# Patient Record
Sex: FEMALE | Race: WHITE | NOT HISPANIC OR LATINO | Employment: OTHER | URBAN - METROPOLITAN AREA
[De-identification: names, ages, dates, MRNs, and addresses within clinical notes are randomized per-mention and may not be internally consistent; named-entity substitution may affect disease eponyms.]

---

## 2017-02-06 ENCOUNTER — TELEPHONE (OUTPATIENT)
Dept: PULMONOLOGY | Facility: CLINIC | Age: 67
End: 2017-02-06

## 2017-02-06 NOTE — TELEPHONE ENCOUNTER
Called to do one year f/u from Pulmonary Rehabilitation graduation. Offered the patient to restart the maintenance program. Patient stated that the drive was too far and that she was no long going to be a patient at our office. Patient is now seeing Dr. Olmos in [facility].

## 2017-02-09 ENCOUNTER — TRANSCRIBE ORDERS (OUTPATIENT)
Dept: ADMINISTRATIVE | Facility: HOSPITAL | Age: 67
End: 2017-02-09

## 2017-02-09 DIAGNOSIS — Z12.31 VISIT FOR SCREENING MAMMOGRAM: Primary | ICD-10-CM

## 2017-02-10 ENCOUNTER — OFFICE VISIT (OUTPATIENT)
Dept: CARDIOLOGY | Facility: CLINIC | Age: 67
End: 2017-02-10

## 2017-02-10 VITALS
BODY MASS INDEX: 24.94 KG/M2 | SYSTOLIC BLOOD PRESSURE: 128 MMHG | DIASTOLIC BLOOD PRESSURE: 78 MMHG | WEIGHT: 127 LBS | HEART RATE: 80 BPM | HEIGHT: 60 IN

## 2017-02-10 DIAGNOSIS — I10 ESSENTIAL HYPERTENSION: ICD-10-CM

## 2017-02-10 DIAGNOSIS — E78.2 MIXED HYPERLIPIDEMIA: ICD-10-CM

## 2017-02-10 DIAGNOSIS — I25.10 CORONARY ARTERY DISEASE INVOLVING NATIVE CORONARY ARTERY OF NATIVE HEART WITHOUT ANGINA PECTORIS: Primary | ICD-10-CM

## 2017-02-10 PROCEDURE — 99213 OFFICE O/P EST LOW 20 MIN: CPT | Performed by: INTERNAL MEDICINE

## 2017-02-10 NOTE — PROGRESS NOTES
LOCATION:  [place] Office    REFERRING PHYSICIAN:  Berenice Milan MD    IDENTIFICATION:  A 65-year-old female, retiree from [place], [place].     PROBLEM LIST:  1. Coronary artery disease:  a. November 2006: [facility] with PTCA for septal  2.5 x 8 Cypher to ostium of LAD/RAMUS.  Normal LVEF.  b.  April 2011: MPS per C:  Normal perfusion, ejection fraction preserved.  2.  Nicotine addiction:  a. February 2014 cessation.  3. Dyslipidemia:  a. February 2008:  Total cholesterol 200, HDL 67, triglycerides 219, LDL calculated at 89.     b. Total cholesterol 200, triglycerides 262, HDL 51, LDL 97.  4. Tonsillectomy.  5. Hysterectomy.  6. Rotator cuff repair in 2000.     ALLERGIES/DRUG INTOLERANCES:     1. PENICILLIN, unknown reaction.  2.  CODEINE, unknown reaction.  3. CECLOR, unknown reaction.  4. STATINS, unknown reaction.    CURRENT MEDICATIONS:    1. Aspirin 325 mg.  2. Metoprolol 25 mg one-half b.i.d.  3. Fish oil 2 tablets daily.  4. Calcium plus D 500 mg b.i.d.  5. Vitamin D 2000 international units daily.  6.  Folic acid 1 mg daily.  7. Nitrostat p.r.n.   8. Atrovent MDI p.r.n.   9. CoQ10 at 100 mg b.i.d.    SUBJECTIVE: Patient presents in followup. No chest pain, palpitations, or presyncope.  Baseline shortness breath, unchanged.     OBJECTIVE:   VITAL SIGNS: Blood pressure 120/80, heart rate 68. Weight 136 pounds, down 3 pounds from last visit.  GENERAL: Thin, white female appears stated age.   NECK: No JVD.   CARDIOVASCULAR:  S1 and S2.   CHEST: Clear of prolonged expiratory phase.   ABDOMEN: Positive bowel sounds.   EXTREMITIES: No pitting edema.     IMPRESSION AND PLAN:  1. Coronary artery disease, remote percutaneous coronary intervention (PCI) and stenting. Continue medical regimen.  2. Hypertension, controlled.   3. Dyslipidemia,  on statin therapy. We will continue current regimen.   4. I will see her back in 9-12 months       Emir Lindquist MD*  ADONIS/raiza     cc: Berenice Milan,  MD [facility]

## 2017-03-08 ENCOUNTER — HOSPITAL ENCOUNTER (OUTPATIENT)
Dept: MAMMOGRAPHY | Facility: HOSPITAL | Age: 67
Discharge: HOME | End: 2017-03-08
Admitting: FAMILY MEDICINE

## 2017-03-08 DIAGNOSIS — Z12.31 VISIT FOR SCREENING MAMMOGRAM: ICD-10-CM

## 2017-03-08 PROCEDURE — G0202 SCR MAMMO BI INCL CAD: HCPCS | Performed by: RADIOLOGY

## 2017-03-08 PROCEDURE — G0202 SCR MAMMO BI INCL CAD: HCPCS

## 2017-03-08 PROCEDURE — 77063 BREAST TOMOSYNTHESIS BI: CPT

## 2017-03-08 PROCEDURE — 77063 BREAST TOMOSYNTHESIS BI: CPT | Performed by: RADIOLOGY

## 2017-11-10 ENCOUNTER — OFFICE VISIT (OUTPATIENT)
Dept: CARDIOLOGY | Facility: CLINIC | Age: 67
End: 2017-11-10

## 2017-11-10 VITALS
DIASTOLIC BLOOD PRESSURE: 80 MMHG | HEIGHT: 59 IN | SYSTOLIC BLOOD PRESSURE: 132 MMHG | HEART RATE: 76 BPM | OXYGEN SATURATION: 99 % | BODY MASS INDEX: 25.12 KG/M2 | WEIGHT: 124.6 LBS

## 2017-11-10 DIAGNOSIS — I25.10 CORONARY ARTERY DISEASE INVOLVING NATIVE CORONARY ARTERY OF NATIVE HEART WITHOUT ANGINA PECTORIS: Primary | ICD-10-CM

## 2017-11-10 DIAGNOSIS — I10 ESSENTIAL HYPERTENSION: ICD-10-CM

## 2017-11-10 DIAGNOSIS — E78.2 MIXED HYPERLIPIDEMIA: ICD-10-CM

## 2017-11-10 PROCEDURE — 99214 OFFICE O/P EST MOD 30 MIN: CPT | Performed by: INTERNAL MEDICINE

## 2017-11-10 NOTE — PROGRESS NOTES
[facility] [facility]  Office Progress Note  Precious Parmar  1950  [phone]      Visit Date: 11/10/2017    PCP: Wero Daley MD  4 [facility] [address]    IDENTIFICATION: A 67 y.o. female , retiree from [place], [place].    Chief Complaint   Patient presents with   • Coronary Artery Disease       PROBLEM LIST:   1. Coronary artery disease:  a. November 2006: [facility] with PTCA for septal  2.5 x 8 Cypher to ostium of LAD/RAMUS.  Normal LVEF.  b.  April 2011: MPS per [facility]:  Normal perfusion, ejection fraction preserved.  c. 9/14 echo wnl IDD  2.  Nicotine addiction:  a. February 2014 cessation.  3. Dyslipidemia:  a. February 2008:  Total cholesterol 200, HDL 67, triglycerides 219, LDL calculated at 89.     b. Total cholesterol 200, triglycerides 262, HDL 51, LDL 97.  4. Tonsillectomy.  5. Hysterectomy.  6. Rotator cuff repair in 2000.  Allergies  Allergies   Allergen Reactions   • Ceclor [Cefaclor]    • Codeine    • Penicillins    • Statins        Current Medications    Current Outpatient Prescriptions:   •  aspirin (ECOTRIN) 325 MG EC tablet, Take 325 mg by mouth Daily., Disp: , Rfl:   •  Cholecalciferol (HM VITAMIN D3) 4000 UNITS capsule, Take 4,000 Units by mouth Daily., Disp: , Rfl:   •  Coenzyme Q10 (CO Q-10) 200 MG capsule, Take 200 mg by mouth Daily., Disp: , Rfl:   •  FOLIC ACID PO, Take 0.8 mg by mouth Daily. OTC, Disp: , Rfl:   •  ipratropium-albuterol (DUO-NEB) 0.5-2.5 mg/mL nebulizer, Take 3 mL by nebulization every 4 (four) hours as needed for wheezing., Disp: , Rfl:   •  metoprolol tartrate (LOPRESSOR) 25 MG tablet, Take 12.5 mg by mouth 2 (Two) Times a Day., Disp: , Rfl:   •  nitroglycerin (NITROSTAT) 0.4 MG SL tablet, Place 0.4 mg under the tongue as needed for chest pain. Take no more than 3 doses in 15 minutes., Disp: , Rfl:   •  O2 (OXYGEN), Oxygen; Patient Sig: Oxygen 2 LITERS PRN & QHS; 0; 15-Dieudonne-2015; Active, Disp: , Rfl:   •  Omega-3 Fatty Acids (FISH  "OIL) 1000 MG capsule capsule, Take 2,000 mg by mouth Daily., Disp: , Rfl:   •  PROAIR  (90 BASE) MCG/ACT inhaler, Inhale 1 puff As Needed., Disp: , Rfl:   •  vitamin B-12 (CYANOCOBALAMIN) 1000 MCG tablet, Take 1,000 mcg by mouth Every Other Day., Disp: , Rfl:       History of Present Illness     Pt denies any new chest pain, dyspnea, dyspnea on exertion, orthopnea, PND, palpitations, lower extremity edema, or claudication.  She is had baseline shortness of breath some worsening as she has discontinued her pulmonary rehabilitation.    ROS:  All systems have been reviewed and are negative with the exception of those mentioned in the HPI.    OBJECTIVE:  Vitals:    11/10/17 1116   Weight: 124 lb 9.6 oz (56.5 kg)   Height: 59\" (149.9 cm)     Physical Exam   Constitutional: She appears well-developed and well-nourished.   Neck: Normal range of motion. Neck supple. No hepatojugular reflux and no JVD present. Carotid bruit is not present. No tracheal deviation present. No thyromegaly present.   Cardiovascular: Normal rate, regular rhythm, S1 normal, S2 normal, intact distal pulses and normal pulses.  PMI is not displaced.  Exam reveals no gallop, no distant heart sounds, no friction rub, no midsystolic click and no opening snap.    No murmur heard.  Pulses:       Radial pulses are 2+ on the right side, and 2+ on the left side.        Dorsalis pedis pulses are 2+ on the right side, and 2+ on the left side.        Posterior tibial pulses are 2+ on the right side, and 2+ on the left side.   Pulmonary/Chest: Effort normal and breath sounds normal. She has no wheezes. She has no rales.   Abdominal: Soft. Bowel sounds are normal. She exhibits no mass. There is no tenderness. There is no guarding.       Diagnostic Data:  Procedures      ASSESSMENT:   Diagnosis Plan   1. Coronary artery disease involving native coronary artery of native heart without angina pectoris     2. Essential hypertension     3. Mixed hyperlipidemia   "       PLAN:  1. Coronary artery disease, remote percutaneous coronary intervention (PCI) and stenting. Continue medical regimen.  2. Hypertension, controlled.   3. Dyslipidemia,  on statin therapy. We will continue current regimen.   4. I will see her back in 12 months     Wero Daley MD, thank you for referring Ms. Parmar for evaluation.  I have forwarded my electronically generated recommendations to you for review.  Please do not hesitate to call with any questions.      Emir Lindquist MD, FACC

## 2018-04-10 ENCOUNTER — APPOINTMENT (OUTPATIENT)
Dept: WOMENS IMAGING | Facility: HOSPITAL | Age: 68
End: 2018-04-10

## 2018-04-10 PROCEDURE — 77067 SCR MAMMO BI INCL CAD: CPT | Performed by: RADIOLOGY

## 2018-11-13 ENCOUNTER — OFFICE VISIT (OUTPATIENT)
Dept: CARDIOLOGY | Facility: CLINIC | Age: 68
End: 2018-11-13

## 2018-11-13 VITALS
DIASTOLIC BLOOD PRESSURE: 70 MMHG | WEIGHT: 117 LBS | BODY MASS INDEX: 22.97 KG/M2 | HEART RATE: 75 BPM | SYSTOLIC BLOOD PRESSURE: 108 MMHG | HEIGHT: 60 IN

## 2018-11-13 DIAGNOSIS — I10 ESSENTIAL HYPERTENSION: ICD-10-CM

## 2018-11-13 DIAGNOSIS — E78.2 MIXED HYPERLIPIDEMIA: ICD-10-CM

## 2018-11-13 DIAGNOSIS — I25.10 CORONARY ARTERY DISEASE INVOLVING NATIVE CORONARY ARTERY OF NATIVE HEART WITHOUT ANGINA PECTORIS: Primary | ICD-10-CM

## 2018-11-13 PROCEDURE — 99213 OFFICE O/P EST LOW 20 MIN: CPT | Performed by: INTERNAL MEDICINE

## 2018-11-13 RX ORDER — IBANDRONATE SODIUM 150 MG/1
150 TABLET, FILM COATED ORAL
COMMUNITY
Start: 2018-10-29 | End: 2020-08-07

## 2018-11-13 NOTE — PROGRESS NOTES
[place] [facility] at [facility]  Office Progress Note  Precious Parmar  1950  [phone]      Visit Date: 11/13/2018     PCP: Wero Daley MD  4 [facility] [place] [address]    IDENTIFICATION: A 68 y.o. female , retiree from [place], [place].    Chief Complaint   Patient presents with   • Coronary Artery Disease       PROBLEM LIST:   1. Coronary artery disease:  a. November 2006: [facility] with PTCA for septal  2.5 x 8 Cypher to ostium of LAD/RAMUS.  Normal LVEF.  b.  April 2011: MPS per [facility]:  Normal perfusion, ejection fraction preserved.  c. 9/14 echo wnl IDD  2.  Nicotine addiction:  a. February 2014 cessation.  3. Dyslipidemia:  a. February 2008:  Total cholesterol 200, HDL 67, triglycerides 219, LDL calculated at 89.     b. Total cholesterol 200, triglycerides 262, HDL 51, LDL 97.  4. Severe COPD- smoking cessation 2014.  10/17 CTA chest [place] severe centrilobar emphysema.  5. Hysterectomy.  6. Rotator cuff repair in 2000.  Allergies  Allergies   Allergen Reactions   • Ceclor [Cefaclor]    • Codeine    • Penicillins    • Statins        Current Medications    Current Outpatient Medications:   •  aspirin (ECOTRIN) 325 MG EC tablet, Take 325 mg by mouth Daily., Disp: , Rfl:   •  Cholecalciferol (HM VITAMIN D3) 4000 UNITS capsule, Take 4,000 Units by mouth Daily., Disp: , Rfl:   •  Coenzyme Q10 (CO Q-10) 200 MG capsule, Take 200 mg by mouth Daily., Disp: , Rfl:   •  FOLIC ACID PO, Take 800 mcg by mouth Daily. OTC , Disp: , Rfl:   •  ipratropium-albuterol (DUO-NEB) 0.5-2.5 mg/mL nebulizer, Take 3 mL by nebulization every 4 (four) hours as needed for wheezing., Disp: , Rfl:   •  metoprolol tartrate (LOPRESSOR) 25 MG tablet, Take 12.5 mg by mouth 2 (Two) Times a Day., Disp: , Rfl:   •  nitroglycerin (NITROSTAT) 0.4 MG SL tablet, Place 0.4 mg under the tongue as needed for chest pain. Take no more than 3 doses in 15 minutes., Disp: , Rfl:   •  O2 (OXYGEN), Oxygen; Patient Sig: Oxygen 2  "LITERS PRN & QHS; 0; 15-Dieudonne-2015; Active, Disp: , Rfl:   •  Omega-3 Fatty Acids (FISH OIL) 1000 MG capsule capsule, Take 2,000 mg by mouth Daily., Disp: , Rfl:   •  PROAIR  (90 BASE) MCG/ACT inhaler, Inhale 1 puff As Needed., Disp: , Rfl:   •  vitamin B-12 (CYANOCOBALAMIN) 1000 MCG tablet, Take 1,000 mcg by mouth Every Other Day., Disp: , Rfl:       History of Present Illness     Pt denies any new chest pain, dyspnea, dyspnea on exertion, orthopnea, PND, palpitations, lower extremity edema, or claudication. Fatigued w baseline dyspnea using O2 at night.   No ntg use.    ROS:  All systems have been reviewed and are negative with the exception of those mentioned in the HPI.    OBJECTIVE:  Vitals:    11/13/18 1125   Weight: 53.1 kg (117 lb)   Height: 151.1 cm (59.5\")     Physical Exam   Constitutional: She appears well-developed and well-nourished.   Neck: Normal range of motion. Neck supple. No hepatojugular reflux and no JVD present. Carotid bruit is not present. No tracheal deviation present. No thyromegaly present.   Cardiovascular: Normal rate, regular rhythm, S1 normal, S2 normal, intact distal pulses and normal pulses. PMI is not displaced. Exam reveals no gallop, no distant heart sounds, no friction rub, no midsystolic click and no opening snap.   No murmur heard.  Pulses:       Radial pulses are 2+ on the right side, and 2+ on the left side.        Dorsalis pedis pulses are 2+ on the right side, and 2+ on the left side.        Posterior tibial pulses are 2+ on the right side, and 2+ on the left side.   Pulmonary/Chest: Effort normal and breath sounds normal. She has no wheezes. She has no rales.   Abdominal: Soft. Bowel sounds are normal. She exhibits no mass. There is no tenderness. There is no guarding.       Diagnostic Data:  Procedures      ASSESSMENT:   Diagnosis Plan   1. Coronary artery disease involving native coronary artery of native heart without angina pectoris     2. Essential hypertension  "    3. Mixed hyperlipidemia         PLAN:  1. Coronary artery disease, remote percutaneous coronary intervention (PCI) and stenting. Continue medical regimen.  2. Hypertension, controlled.   3. Dyslipidemia,  on statin therapy. We will continue current regimen.   4. Fatigue /hair loss - rec'd serologies w tsh  5. I will see her back in 12 months     Wero Daley MD, thank you for referring Ms. Parmar for evaluation.  I have forwarded my electronically generated recommendations to you for review.  Please do not hesitate to call with any questions.    Emir Lindquist MD, FACC

## 2020-02-05 NOTE — PROGRESS NOTES
[place] [facility]  Office Progress Note  Precious Parmar  1950      Visit Date: 02/06/20    PCP: Wero Daley MD  4 [facility] [place] [address]    IDENTIFICATION: A 69 y.o. female retiree from [place], [place].  Released from  hospice 2019      PROBLEM LIST:   1. Coronary artery disease:  a. November 2006: [facility] with PTCA for septal  2.5 x 8 Cypher to ostium of LAD/RAMUS.  Normal LVEF.  b.  April 2011: MPS per [facility]:  Normal perfusion, ejection fraction preserved.  c. 9/14 echo wnl IDD  2.  Nicotine addiction:  a. February 2014 cessation.  3. Dyslipidemia:  a. February 2008:  Total cholesterol 200, HDL 67, triglycerides 219, LDL calculated at 89.     b. Total cholesterol 200, triglycerides 262, HDL 51, LDL 97.  4. Severe COPD- smoking cessation 2014.  10/17 CTA chest [place] severe centrilobar emphysema.  5. Hysterectomy.  Rotator cuff repair in 2000.    Chief Complaint   Patient presents with   • Coronary Artery Disease       Allergies  Allergies   Allergen Reactions   • Ceclor [Cefaclor]    • Codeine    • Diltiazem Swelling   • Penicillins    • Statins        Current Medications    Current Outpatient Medications:   •  ALPRAZolam (XANAX) 0.25 MG tablet, Take 0.25 mg by mouth 3 (Three) Times a Day As Needed for Anxiety., Disp: , Rfl:   •  aspirin (ECOTRIN) 325 MG EC tablet, Take 325 mg by mouth Daily., Disp: , Rfl:   •  Cholecalciferol ( VITAMIN D3) 4000 UNITS capsule, Take 4,000 Units by mouth Daily., Disp: , Rfl:   •  Coenzyme Q10 (CO Q-10) 200 MG capsule, Take 200 mg by mouth Daily., Disp: , Rfl:   •  FOLIC ACID PO, Take 800 mcg by mouth Daily. OTC , Disp: , Rfl:   •  ibandronate (BONIVA) 150 MG tablet, Take 150 mg by mouth Every 30 (Thirty) Days., Disp: , Rfl:   •  ipratropium-albuterol (DUO-NEB) 0.5-2.5 mg/mL nebulizer, Take 3 mL by nebulization every 4 (four) hours as needed for wheezing., Disp: , Rfl:   •  metoprolol tartrate (LOPRESSOR) 25 MG tablet, Take 12.5 mg by  "mouth 2 (Two) Times a Day., Disp: , Rfl:   •  nitroglycerin (NITROSTAT) 0.4 MG SL tablet, Place 0.4 mg under the tongue as needed for chest pain. Take no more than 3 doses in 15 minutes., Disp: , Rfl:   •  O2 (OXYGEN), Oxygen; Patient Sig: Oxygen 2 LITERS PRN & QHS; 0; 15-Dieudonne-2015; Active, Disp: , Rfl:   •  Omega-3 Fatty Acids (FISH OIL) 1000 MG capsule capsule, Take 2,000 mg by mouth Daily., Disp: , Rfl:   •  PROAIR  (90 BASE) MCG/ACT inhaler, Inhale 1 puff As Needed., Disp: , Rfl:   •  vitamin B-12 (CYANOCOBALAMIN) 1000 MCG tablet, Take 1,000 mcg by mouth Every Other Day., Disp: , Rfl:       History of Present Illness   Precious Parmar is a 69 y.o. year old female here for follow up.  Had a dismal 2019 been taken to [facility] in [place] with respiratory decline ultimately placed on hospice and sent home.  She was down to 75 pounds.  With care and  Compassion of friends and family she was able to rally and improve over the last several months.          OBJECTIVE:  Vitals:    02/06/20 1340   BP: 130/70   BP Location: Right arm   Patient Position: Sitting   Pulse: 105   SpO2: 98%   Weight: 43.1 kg (95 lb)   Height: 151.1 cm (59.5\")     Physical Exam   Constitutional: She appears well-developed and well-nourished.   Frail on  chronic O2   Neck: Normal range of motion. Neck supple. No hepatojugular reflux and no JVD present. Carotid bruit is not present. No tracheal deviation present. No thyromegaly present.   Cardiovascular: Normal rate, regular rhythm, S1 normal, S2 normal, intact distal pulses and normal pulses. PMI is not displaced. Exam reveals no gallop, no distant heart sounds, no friction rub, no midsystolic click and no opening snap.   No murmur heard.  Pulses:       Radial pulses are 2+ on the right side, and 2+ on the left side.        Dorsalis pedis pulses are 2+ on the right side, and 2+ on the left side.        Posterior tibial pulses are 2+ on the right side, and 2+ on the left side. "   Pulmonary/Chest: Effort normal. She has wheezes. She has no rales.   Abdominal: Soft. Bowel sounds are normal. She exhibits no mass. There is no tenderness. There is no guarding.       Diagnostic Data:  Procedures      ASSESSMENT:   Diagnosis Plan   1. Coronary artery disease involving native coronary artery of native heart without angina pectoris     2. Essential hypertension     3. Mixed hyperlipidemia         PLAN:  CAD post remote PCI and stenting continued current regimen decrease aspirin to 81 daily    Hypertension controlled on current regimen    Dyslipidemia off statin therapy due to intolerance        Wero Daley MD, thank you for referring Ms. Parmar for evaluation.  I have forwarded my electronically generated recommendations to you for review.  Please do not hesitate to call with any questions.      Emir Lindquist MD, FACC

## 2020-02-06 ENCOUNTER — OFFICE VISIT (OUTPATIENT)
Dept: CARDIOLOGY | Facility: CLINIC | Age: 70
End: 2020-02-06

## 2020-02-06 VITALS
HEART RATE: 105 BPM | WEIGHT: 95 LBS | BODY MASS INDEX: 18.65 KG/M2 | OXYGEN SATURATION: 98 % | DIASTOLIC BLOOD PRESSURE: 70 MMHG | SYSTOLIC BLOOD PRESSURE: 130 MMHG | HEIGHT: 60 IN

## 2020-02-06 DIAGNOSIS — I10 ESSENTIAL HYPERTENSION: ICD-10-CM

## 2020-02-06 DIAGNOSIS — E78.2 MIXED HYPERLIPIDEMIA: ICD-10-CM

## 2020-02-06 DIAGNOSIS — I25.10 CORONARY ARTERY DISEASE INVOLVING NATIVE CORONARY ARTERY OF NATIVE HEART WITHOUT ANGINA PECTORIS: Primary | ICD-10-CM

## 2020-02-06 PROCEDURE — 99213 OFFICE O/P EST LOW 20 MIN: CPT | Performed by: INTERNAL MEDICINE

## 2020-02-06 RX ORDER — ALPRAZOLAM 0.25 MG/1
0.25 TABLET ORAL 3 TIMES DAILY PRN
COMMUNITY
End: 2022-06-16 | Stop reason: SDUPTHER

## 2020-08-03 NOTE — PROGRESS NOTES
[place] [facility] at [facility]  Office Progress Note  Precious Parmar  1950  [address] [facility] [address]       Visit Date: 08/07/20    PCP: Wero Daley MD  4 [facility] [address]    IDENTIFICATION: A 70 y.o. female  retiree from [place], [place].  Released from  hospice 2019      PROBLEM LIST:   1. Coronary artery disease:  a. November 2006: [facility] with PTCA for septal  2.5 x 8 Cypher to ostium of LAD/RAMUS.  Normal LVEF.  b.  April 2011: MPS per [facility]:  Normal perfusion, ejection fraction preserved.  c. 9/14 echo wnl IDD  2.  Nicotine addiction:  a. February 2014 cessation.  3. Dyslipidemia:  a. February 2008:  Total cholesterol 200, HDL 67, triglycerides 219, LDL calculated at 89.     b. Total cholesterol 200, triglycerides 262, HDL 51, LDL 97.  4. Severe COPD- smoking cessation 2014.  1. 10/17 CTA chest [place] severe centrilobar emphysema.  5. Hysterectomy.  6. Rotator cuff repair in 2000.      CC:   Chief Complaint   Patient presents with   • Coronary Artery Disease       Allergies  Allergies   Allergen Reactions   • Ceclor [Cefaclor]    • Codeine    • Diltiazem Swelling   • Penicillins    • Statins        Current Medications    Current Outpatient Medications:   •  ALPRAZolam (XANAX) 0.25 MG tablet, Take 0.25 mg by mouth 3 (Three) Times a Day As Needed for Anxiety., Disp: , Rfl:   •  aspirin (ECOTRIN) 325 MG EC tablet, Take 81 mg by mouth Daily., Disp: , Rfl:   •  Cholecalciferol (HM VITAMIN D3) 4000 UNITS capsule, Take 4,000 Units by mouth Daily., Disp: , Rfl:   •  Coenzyme Q10 (CO Q-10) 200 MG capsule, Take 200 mg by mouth Daily., Disp: , Rfl:   •  FOLIC ACID PO, Take 800 mcg by mouth Daily. OTC , Disp: , Rfl:   •  ipratropium-albuterol (DUO-NEB) 0.5-2.5 mg/mL nebulizer, Take 3 mL by nebulization every 4 (four) hours as needed for wheezing., Disp: , Rfl:   •  metoprolol tartrate (LOPRESSOR) 25 MG tablet, Take 25 mg by mouth 2 (Two) Times a Day., Disp: , Rfl:   •   "nitroglycerin (NITROSTAT) 0.4 MG SL tablet, Place 0.4 mg under the tongue as needed for chest pain. Take no more than 3 doses in 15 minutes., Disp: , Rfl:   •  O2 (OXYGEN), Oxygen; Patient Sig: Oxygen 2 LITERS PRN & QHS; 0; 15-Dieudonne-2015; Active, Disp: , Rfl:   •  Omega-3 Fatty Acids (FISH OIL) 1000 MG capsule capsule, Take 2,000 mg by mouth Daily., Disp: , Rfl:   •  PROAIR  (90 BASE) MCG/ACT inhaler, Inhale 1 puff As Needed., Disp: , Rfl:   •  vitamin B-12 (CYANOCOBALAMIN) 1000 MCG tablet, Take 1,000 mcg by mouth Every Other Day. mon-wed-fri, Disp: , Rfl:       History of Present Illness   Precious Parmar is a 70 y.o. year old female  For telehealth follow up.    Pt denies any chest pain, dyspnea, dyspnea on exertion, orthopnea, PND, palpitations, lower extremity edema, or claudication.  Some gingival infx on abx  Staying home    OBJECTIVE:  Vitals:    08/07/20 1440 08/07/20 1445   BP: 98/56 113/69   BP Location: Left arm Right arm   Patient Position: Sitting Sitting   Pulse: 70 81   Weight: 40.7 kg (89 lb 12.8 oz)    Height: 151.1 cm (59.5\")      Physical Exam    Diagnostic Data:  Procedures      ASSESSMENT:   Diagnosis Plan   1. Coronary artery disease involving native coronary artery of native heart without angina pectoris     2. Essential hypertension     3. Mixed hyperlipidemia         PLAN:  CAD post remote PCI and stenting continued current regimen decrease aspirin to 81 daily     Hypertension controlled on current regimen     Dyslipidemia off statin therapy due to intolerance       Wero Daley MD, thank you for referring Ms. Parmar for evaluation.  I have forwarded my electronically generated recommendations to you for review.  Please do not hesitate to call with any questions.  This patient has consented to a telehealth visit via telehealth. The visit was scheduled as a phone visit to comply with patient safety concerns in accordance with CDC recommendations.  All vitals recorded within this visit " are reported by the patient.  I spent  15 minutes in total including but not limited to the 11 minutes spent in direct conversation with this patient.       Emir Lindquist MD, FACC

## 2020-08-07 ENCOUNTER — OFFICE VISIT (OUTPATIENT)
Dept: CARDIOLOGY | Facility: CLINIC | Age: 70
End: 2020-08-07

## 2020-08-07 VITALS
HEIGHT: 60 IN | BODY MASS INDEX: 17.63 KG/M2 | SYSTOLIC BLOOD PRESSURE: 113 MMHG | DIASTOLIC BLOOD PRESSURE: 69 MMHG | WEIGHT: 89.8 LBS | HEART RATE: 81 BPM

## 2020-08-07 DIAGNOSIS — I10 ESSENTIAL HYPERTENSION: ICD-10-CM

## 2020-08-07 DIAGNOSIS — E78.2 MIXED HYPERLIPIDEMIA: ICD-10-CM

## 2020-08-07 DIAGNOSIS — I25.10 CORONARY ARTERY DISEASE INVOLVING NATIVE CORONARY ARTERY OF NATIVE HEART WITHOUT ANGINA PECTORIS: Primary | ICD-10-CM

## 2020-08-07 PROCEDURE — 99442 PR PHYS/QHP TELEPHONE EVALUATION 11-20 MIN: CPT | Performed by: INTERNAL MEDICINE

## 2021-08-13 ENCOUNTER — OFFICE VISIT (OUTPATIENT)
Dept: CARDIOLOGY | Facility: CLINIC | Age: 71
End: 2021-08-13

## 2021-08-13 VITALS
HEART RATE: 83 BPM | OXYGEN SATURATION: 97 % | WEIGHT: 91.8 LBS | HEIGHT: 59 IN | BODY MASS INDEX: 18.51 KG/M2 | SYSTOLIC BLOOD PRESSURE: 122 MMHG | DIASTOLIC BLOOD PRESSURE: 79 MMHG

## 2021-08-13 DIAGNOSIS — E78.2 MIXED HYPERLIPIDEMIA: ICD-10-CM

## 2021-08-13 DIAGNOSIS — I25.10 CORONARY ARTERY DISEASE INVOLVING NATIVE CORONARY ARTERY OF NATIVE HEART WITHOUT ANGINA PECTORIS: Primary | ICD-10-CM

## 2021-08-13 DIAGNOSIS — I10 ESSENTIAL HYPERTENSION: ICD-10-CM

## 2021-08-13 PROCEDURE — 99442 PR PHYS/QHP TELEPHONE EVALUATION 11-20 MIN: CPT | Performed by: INTERNAL MEDICINE

## 2021-08-13 RX ORDER — MIRTAZAPINE 15 MG/1
TABLET, FILM COATED ORAL
COMMUNITY
Start: 2021-06-22 | End: 2022-09-16 | Stop reason: SDUPTHER

## 2021-08-13 NOTE — PROGRESS NOTES
[facility]  Office Progress Note  Precious Parmar  1950  [address] [facility] [address]       Visit Date: 08/13/21    PCP: Wero Daley MD  [address] [place] [address]    IDENTIFICATION: A 71 y.o. female  retiree from [place], [place].  Released from  hospice 2019      PROBLEM LIST:   1. Coronary artery disease:  a. November 2006: [facility] with PTCA for septal  2.5 x 8 Cypher to ostium of LAD/RAMUS.  Normal LVEF.  b.  April 2011: MPS per C:  Normal perfusion, ejection fraction preserved.  c. 9/14 echo wnl IDD  2.  Nicotine addiction:  a. February 2014 cessation.  3. Dyslipidemia:  a. February 2008:  Total cholesterol 200, HDL 67, triglycerides 219, LDL calculated at 89.     b. Total cholesterol 200, triglycerides 262, HDL 51, LDL 97.  4. Severe COPD- smoking cessation 2014.  1. 10/17 CTA chest [place] severe centrilobar emphysema.  5. Hysterectomy.  6. Rotator cuff repair in 2000.      CC:   Chief Complaint   Patient presents with   • Coronary artery disease involving native coronary artery of        Allergies  Allergies   Allergen Reactions   • Ceclor [Cefaclor]    • Codeine    • Diltiazem Swelling   • Penicillins    • Statins        Current Medications    Current Outpatient Medications:   •  ALPRAZolam (XANAX) 0.25 MG tablet, Take 0.25 mg by mouth 3 (Three) Times a Day As Needed for Anxiety., Disp: , Rfl:   •  Cholecalciferol ( VITAMIN D3) 4000 UNITS capsule, Take 4,000 Units by mouth Daily., Disp: , Rfl:   •  FOLIC ACID PO, Take 800 mcg by mouth Daily. OTC , Disp: , Rfl:   •  ipratropium-albuterol (DUO-NEB) 0.5-2.5 mg/mL nebulizer, Take 3 mL by nebulization Every 4 (Four) Hours., Disp: , Rfl:   •  metoprolol tartrate (LOPRESSOR) 25 MG tablet, Take 25 mg by mouth 2 (Two) Times a Day., Disp: , Rfl:   •  mirtazapine (REMERON) 15 MG tablet, every night at bedtime., Disp: , Rfl:   •  nitroglycerin (NITROSTAT) 0.4 MG SL tablet, Place 0.4 mg under the tongue  "as needed for chest pain. Take no more than 3 doses in 15 minutes., Disp: , Rfl:   •  O2 (OXYGEN), 2 L/min., Disp: , Rfl:   •  Omega-3 Fatty Acids (FISH OIL) 1000 MG capsule capsule, Take 2,000 mg by mouth Daily., Disp: , Rfl:   •  PROAIR  (90 BASE) MCG/ACT inhaler, Inhale 1 puff As Needed., Disp: , Rfl:   •  vitamin B-12 (CYANOCOBALAMIN) 1000 MCG tablet, Take 1,000 mcg by mouth Every Other Day. mon-wed-fri, Disp: , Rfl:       History of Present Illness   Precious Parmar is a 71 y.o. year old female  For telehealth follow up.    Pt denies any chest pain, dyspnea, dyspnea on exertion, orthopnea, PND, palpitations, lower extremity edema, or claudication.  Has lost weight and has lost interest in taking her Ensure shakes  Staying home    OBJECTIVE:  Vitals:    08/13/21 1545   BP: 122/79   Patient Position: Sitting   Pulse: 83   SpO2: 97%   Weight: 41.6 kg (91 lb 12.8 oz)   Height: 149.9 cm (59\")     Physical Exam    Diagnostic Data:  Procedures      ASSESSMENT:   Diagnosis Plan   1. Coronary artery disease involving native coronary artery of native heart without angina pectoris     2. Essential hypertension     3. Mixed hyperlipidemia         PLAN:  CAD post remote PCI and stenting continued current regimen decrease aspirin to 81 daily     Hypertension controlled on current regimen     Dyslipidemia off statin therapy due to intolerance       Wero Daley MD, thank you for referring Ms. Parmar for evaluation.  I have forwarded my electronically generated recommendations to you for review.  Please do not hesitate to call with any questions.  This patient has consented to a telehealth visit via teleiCrederity. The visit was scheduled as a phone visit to comply with patient safety concerns in accordance with CDC recommendations.  All vitals recorded within this visit are reported by the patient.  I spent  15 minutes in total including but not limited to the 11 minutes spent in direct conversation with this patient. "       Emir Lindquist MD, [facility]

## 2022-06-02 ENCOUNTER — TELEPHONE (OUTPATIENT)
Dept: FAMILY MEDICINE CLINIC | Facility: CLINIC | Age: 72
End: 2022-06-02

## 2022-06-02 NOTE — TELEPHONE ENCOUNTER
PT. IS DUE FOR A , BUT IS UNSURE OF HOW TO SCHEDULE APPOINTMENT. SHE IS WANTING TELE HEALTH, BUT NOT SURE IF SHE COULD DO TELE HEALTH BEING HER MEDS ARE CONTROLLED.     PT. REQUESTED TO BE CONTACTED [phone]

## 2022-06-03 NOTE — TELEPHONE ENCOUNTER
I called pt yesterday and had front make pt appt for blood work, and can do a my chart appt with Dr. Daley because pt is pretty much home bound

## 2022-06-07 DIAGNOSIS — Z11.59 NEED FOR HEPATITIS C SCREENING TEST: ICD-10-CM

## 2022-06-07 DIAGNOSIS — I10 PRIMARY HYPERTENSION: ICD-10-CM

## 2022-06-07 DIAGNOSIS — M85.89 OSTEOPENIA OF MULTIPLE SITES: ICD-10-CM

## 2022-06-07 DIAGNOSIS — E78.5 DYSLIPIDEMIA: Primary | ICD-10-CM

## 2022-06-07 DIAGNOSIS — E55.9 VITAMIN D DEFICIENCY: ICD-10-CM

## 2022-06-08 ENCOUNTER — LAB (OUTPATIENT)
Dept: FAMILY MEDICINE CLINIC | Facility: CLINIC | Age: 72
End: 2022-06-08

## 2022-06-08 ENCOUNTER — TELEPHONE (OUTPATIENT)
Dept: FAMILY MEDICINE CLINIC | Facility: CLINIC | Age: 72
End: 2022-06-08

## 2022-06-08 DIAGNOSIS — E55.9 VITAMIN D DEFICIENCY: ICD-10-CM

## 2022-06-08 DIAGNOSIS — Z11.59 NEED FOR HEPATITIS C SCREENING TEST: ICD-10-CM

## 2022-06-08 DIAGNOSIS — I10 PRIMARY HYPERTENSION: ICD-10-CM

## 2022-06-08 DIAGNOSIS — M85.89 OSTEOPENIA OF MULTIPLE SITES: ICD-10-CM

## 2022-06-08 DIAGNOSIS — E78.5 DYSLIPIDEMIA: ICD-10-CM

## 2022-06-08 PROCEDURE — 36415 COLL VENOUS BLD VENIPUNCTURE: CPT | Performed by: FAMILY MEDICINE

## 2022-06-09 LAB
HCV AB S/CO SERPL IA: <0.1 S/CO RATIO (ref 0–0.9)
TSH SERPL DL<=0.005 MIU/L-ACNC: 0.52 UIU/ML (ref 0.45–4.5)
VIT B12 SERPL-MCNC: 789 PG/ML (ref 232–1245)

## 2022-06-10 LAB — 25(OH)D3+25(OH)D2 SERPL-MCNC: 69.3 NG/ML (ref 30–100)

## 2022-06-16 ENCOUNTER — TELEMEDICINE (OUTPATIENT)
Dept: FAMILY MEDICINE CLINIC | Facility: CLINIC | Age: 72
End: 2022-06-16

## 2022-06-16 VITALS
BODY MASS INDEX: 20.81 KG/M2 | WEIGHT: 106 LBS | SYSTOLIC BLOOD PRESSURE: 115 MMHG | OXYGEN SATURATION: 97 % | TEMPERATURE: 98 F | RESPIRATION RATE: 16 BRPM | HEIGHT: 60 IN | DIASTOLIC BLOOD PRESSURE: 65 MMHG | HEART RATE: 88 BPM

## 2022-06-16 DIAGNOSIS — F41.9 ANXIETY: Primary | ICD-10-CM

## 2022-06-16 DIAGNOSIS — J43.9 PULMONARY EMPHYSEMA, UNSPECIFIED EMPHYSEMA TYPE: ICD-10-CM

## 2022-06-16 DIAGNOSIS — R63.4 LOSS OF WEIGHT: ICD-10-CM

## 2022-06-16 PROBLEM — Z87.442 HISTORY OF RENAL CALCULI: Status: ACTIVE | Noted: 2018-02-19

## 2022-06-16 PROBLEM — R31.0 FRANK HEMATURIA: Status: ACTIVE | Noted: 2020-11-25

## 2022-06-16 PROBLEM — E04.2 NON-TOXIC MULTINODULAR GOITER: Status: ACTIVE | Noted: 2018-02-19

## 2022-06-16 PROBLEM — Z23 ENCOUNTER FOR IMMUNIZATION: Status: ACTIVE | Noted: 2018-04-18

## 2022-06-16 PROBLEM — M79.10 MUSCLE PAIN: Status: ACTIVE | Noted: 2022-06-16

## 2022-06-16 PROBLEM — Z79.899 HIGH RISK MEDICATION USE: Status: ACTIVE | Noted: 2022-06-16

## 2022-06-16 PROBLEM — E78.2 MIXED HYPERLIPIDEMIA: Status: ACTIVE | Noted: 2018-02-19

## 2022-06-16 PROBLEM — Q61.02 MULTIPLE RENAL CYSTS: Status: ACTIVE | Noted: 2020-11-25

## 2022-06-16 PROBLEM — K59.09 CHRONIC CONSTIPATION: Status: ACTIVE | Noted: 2018-02-19

## 2022-06-16 PROBLEM — R09.02 HYPOXEMIA: Status: ACTIVE | Noted: 2018-02-15

## 2022-06-16 PROBLEM — N18.30 CHRONIC KIDNEY DISEASE, STAGE 3 (MODERATE): Status: ACTIVE | Noted: 2018-02-19

## 2022-06-16 PROBLEM — K57.30 DIVERTICULAR DISEASE OF COLON: Status: ACTIVE | Noted: 2018-02-19

## 2022-06-16 PROBLEM — Z87.891 HISTORY OF TOBACCO USE DISORDER: Status: ACTIVE | Noted: 2018-02-19

## 2022-06-16 PROBLEM — M81.0 SENILE OSTEOPOROSIS: Status: ACTIVE | Noted: 2018-02-19

## 2022-06-16 PROBLEM — R79.89 LOW VITAMIN B12 LEVEL: Status: ACTIVE | Noted: 2018-02-19

## 2022-06-16 PROBLEM — N20.0 KIDNEY STONE: Status: ACTIVE | Noted: 2020-11-25

## 2022-06-16 PROBLEM — E53.8 LOW VITAMIN B12 LEVEL: Status: ACTIVE | Noted: 2018-02-19

## 2022-06-16 PROBLEM — N39.0 URINARY TRACT INFECTIOUS DISEASE: Status: ACTIVE | Noted: 2020-12-31

## 2022-06-16 PROCEDURE — 99213 OFFICE O/P EST LOW 20 MIN: CPT | Performed by: FAMILY MEDICINE

## 2022-06-16 RX ORDER — MEGESTROL ACETATE 40 MG/ML
20 SUSPENSION ORAL
COMMUNITY
Start: 2022-03-17 | End: 2022-06-17

## 2022-06-16 RX ORDER — UBIDECARENONE 200 MG
CAPSULE ORAL
COMMUNITY
End: 2022-08-26

## 2022-06-16 RX ORDER — MIRTAZAPINE 15 MG/1
1 TABLET, FILM COATED ORAL
COMMUNITY
Start: 2022-03-17 | End: 2022-06-16 | Stop reason: SDUPTHER

## 2022-06-16 RX ORDER — ZINC GLUCONATE 50 MG
TABLET ORAL
COMMUNITY
Start: 2021-12-20

## 2022-06-16 RX ORDER — ASPIRIN 81 MG/1
1 TABLET ORAL DAILY
COMMUNITY
Start: 2021-12-20

## 2022-06-16 RX ORDER — ALPRAZOLAM 0.25 MG/1
0.25 TABLET ORAL EVERY 8 HOURS
Qty: 90 TABLET | Refills: 2 | Status: SHIPPED | OUTPATIENT
Start: 2022-06-16 | End: 2022-09-16 | Stop reason: SDUPTHER

## 2022-06-16 RX ORDER — ALPRAZOLAM 0.25 MG/1
1 TABLET ORAL EVERY 8 HOURS
COMMUNITY
Start: 2022-03-17 | End: 2022-06-16 | Stop reason: SDUPTHER

## 2022-06-16 RX ORDER — PREDNISONE 10 MG/1
10 TABLET ORAL DAILY
Qty: 30 TABLET | Refills: 0 | Status: SHIPPED | OUTPATIENT
Start: 2022-06-16 | End: 2022-08-26

## 2022-06-16 NOTE — PROGRESS NOTES
Telehealth E-Visit      Patient Name: Precious Parmar  : 1950   MRN: [id number]     Chief Complaint:    Chief Complaint   Patient presents with   • lab results     Pt doing a virtual visit for lab results   • Anxiety   • Hyperlipidemia       I have reviewed the E-Visit questionnaire and the patient's answers, my assessment and plan are listed below.   You have chosen to receive care through a telehealth visit.  Do you consent to use a video/audio connection for your medical care today? Yes    History of Present Illness: Precious Parmar is a 72 y.o. female who is here today to follow up with anxiety      Subjective      Review of Systems:   Review of Systems   Constitutional: Negative.    HENT: Negative.    Eyes: Negative.    Respiratory: Negative.    Cardiovascular: Negative.    Gastrointestinal: Negative.    Neurological: Negative.        I have reviewed and the following portions of the patient's history were updated as appropriate: past family history, past medical history, past social history, past surgical history and problem list.    Medications:     Current Outpatient Medications:   •  ALPRAZolam (XANAX) 0.25 MG tablet, Take 1 tablet by mouth Every 8 (Eight) Hours., Disp: 90 tablet, Rfl: 2  •  aspirin 81 MG EC tablet, Take 1 tablet by mouth Daily., Disp: , Rfl:   •  Cholecalciferol 50 MCG (2000 UT) tablet, take one tablet by oral route daily, Disp: , Rfl:   •  Coenzyme Q10 200 MG capsule, Co Q-10 200 mg capsule  Take by oral route., Disp: , Rfl:   •  FOLIC ACID PO, Take 800 mcg by mouth Daily. OTC, Disp: , Rfl:   •  ipratropium-albuterol (DUO-NEB) 0.5-2.5 mg/mL nebulizer, Take 3 mL by nebulization Every 4 (Four) Hours., Disp: , Rfl:   •  metoprolol tartrate (LOPRESSOR) 25 MG tablet, Take 25 mg by mouth 2 (Two) Times a Day., Disp: , Rfl:   •  mirtazapine (REMERON) 15 MG tablet, every night at bedtime., Disp: , Rfl:   •  nitroglycerin (NITROSTAT) 0.4 MG SL tablet, Place 0.4 mg under the tongue As  "Needed for Chest Pain. Take no more than 3 doses in 15 minutes., Disp: , Rfl:   •  O2 (OXYGEN), 2 L/min., Disp: , Rfl:   •  Omega-3 Fatty Acids (FISH OIL) 1000 MG capsule capsule, Take 2,000 mg by mouth Daily., Disp: , Rfl:   •  PROAIR  (90 BASE) MCG/ACT inhaler, Inhale 1 puff As Needed., Disp: , Rfl:   •  vitamin B-12 (CYANOCOBALAMIN) 1000 MCG tablet, Take 1,000 mcg by mouth Every Other Day. mon-wed-fri, Disp: , Rfl:   •  Zinc 50 MG tablet, , Disp: , Rfl:   •  megestrol (MEGACE) 40 MG/ML suspension, Take 20 mL by mouth., Disp: , Rfl:   •  predniSONE (DELTASONE) 10 MG tablet, Take 1 tablet by mouth Daily. 5 po once a day for 2 days, 4 po once a day for 2 days, 3 po once a day for 2 days, 2 po once a day for 2 days, 1 po once a day for 2 days, Disp: 30 tablet, Rfl: 0    Allergies:   Allergies   Allergen Reactions   • Ceclor [Cefaclor]    • Cephalosporins Unknown - High Severity   • Codeine    • Diltiazem Swelling   • Olodaterol Unknown - High Severity   • Penicillins    • Statins    • Tiotropium Unknown - High Severity       Objective     Physical Exam:  Vital Signs:   Vitals:    06/16/22 1101   BP: 115/65   BP Location: Left arm   Patient Position: Sitting   Cuff Size: Adult  Comment: home monitor   Pulse: 88   Resp: 16   Temp: 98 °F (36.7 °C)   SpO2: 97%  Comment: 2 liters   Weight: 48.1 kg (106 lb)   Height: 151.1 cm (59.5\")   PainSc: 0-No pain     Body mass index is 21.05 kg/m².    Physical Exam  Vitals and nursing note reviewed.   Constitutional:       Appearance: Normal appearance. She is normal weight.   HENT:      Head: Normocephalic and atraumatic.      Right Ear: External ear normal.      Left Ear: External ear normal.      Nose: Nose normal.   Eyes:      Extraocular Movements: Extraocular movements intact.      Conjunctiva/sclera: Conjunctivae normal.   Pulmonary:      Effort: Pulmonary effort is normal.   Musculoskeletal:      Cervical back: Normal range of motion.   Feet:      Comments:    "   Neurological:      General: No focal deficit present.      Mental Status: She is alert and oriented to person, place, and time. Mental status is at baseline.   Psychiatric:         Mood and Affect: Mood normal.         Behavior: Behavior normal.         Thought Content: Thought content normal.         Judgment: Judgment normal.           Assessment / Plan      Assessment/Plan:   Diagnoses and all orders for this visit:    1. Anxiety (Primary)  Assessment & Plan:  Continue Xanax 0.25 mg TID, RTC in 3 months    Orders:  -     ALPRAZolam (XANAX) 0.25 MG tablet; Take 1 tablet by mouth Every 8 (Eight) Hours.  Dispense: 90 tablet; Refill: 2    2. Pulmonary emphysema, unspecified emphysema type (HCC)  Assessment & Plan:  Pt is not doing well, will give her a prednisone taper      Orders:  -     ALPRAZolam (XANAX) 0.25 MG tablet; Take 1 tablet by mouth Every 8 (Eight) Hours.  Dispense: 90 tablet; Refill: 2    3. Loss of weight  Assessment & Plan:  Pt now weighs 100 lbs, doing well. Will follow      Other orders  -     predniSONE (DELTASONE) 10 MG tablet; Take 1 tablet by mouth Daily. 5 po once a day for 2 days, 4 po once a day for 2 days, 3 po once a day for 2 days, 2 po once a day for 2 days, 1 po once a day for 2 days  Dispense: 30 tablet; Refill: 0           Follow Up:   No follow-ups on file.    Any medications prescribed have been sent electronically to   [place] [id number] - [facility] [address] [phone]  - [phone] [address] [address]  Phone: [phone] Fax: [phone]      20 minutes were spent reviewing the patient's questionnaire, formulating a treatment plan, and relaying information to the patient via Tube2Tonet.    Wero Daley MD  [facility]   06/16/22  11:43 EDT

## 2022-06-20 DIAGNOSIS — J43.9 PULMONARY EMPHYSEMA, UNSPECIFIED EMPHYSEMA TYPE: ICD-10-CM

## 2022-06-20 DIAGNOSIS — F41.9 ANXIETY: ICD-10-CM

## 2022-06-20 RX ORDER — ALPRAZOLAM 0.25 MG/1
TABLET ORAL
Qty: 90 TABLET | OUTPATIENT
Start: 2022-06-20

## 2022-08-26 ENCOUNTER — OFFICE VISIT (OUTPATIENT)
Dept: CARDIOLOGY | Facility: CLINIC | Age: 72
End: 2022-08-26

## 2022-08-26 VITALS
DIASTOLIC BLOOD PRESSURE: 53 MMHG | HEIGHT: 59 IN | OXYGEN SATURATION: 98 % | SYSTOLIC BLOOD PRESSURE: 106 MMHG | HEART RATE: 86 BPM | WEIGHT: 100 LBS | BODY MASS INDEX: 20.16 KG/M2

## 2022-08-26 DIAGNOSIS — I25.10 CORONARY ARTERY DISEASE INVOLVING NATIVE CORONARY ARTERY OF NATIVE HEART WITHOUT ANGINA PECTORIS: Primary | ICD-10-CM

## 2022-08-26 DIAGNOSIS — E78.2 MIXED HYPERLIPIDEMIA: ICD-10-CM

## 2022-08-26 PROCEDURE — 99442 PR PHYS/QHP TELEPHONE EVALUATION 11-20 MIN: CPT | Performed by: INTERNAL MEDICINE

## 2022-08-26 NOTE — PROGRESS NOTES
[facility]  Office Progress Note  Precious Parmar  1950  [address] [facility] [address]       Visit Date: 08/26/22    PCP: Wero Daley MD  [address] [facility] [address]    IDENTIFICATION: A 72 y.o. female retiree from [place], [place].  Released from  hospice 2019    PROBLEM LIST:   1. Coronary artery disease:  a. November 2006: [facility] with PTCA for septal  2.5 x 8 Cypher to ostium of LAD/RAMUS.  Normal LVEF.  b.  April 2011: MPS per [facility]:  Normal perfusion, ejection fraction preserved.  c. 9/14 echo wnl IDD  2.  Nicotine addiction:  a. February 2014 cessation.  3. Dyslipidemia:  a. February 2008:  Total cholesterol 200, HDL 67, triglycerides 219, LDL calculated at 89.     b. Total cholesterol 200, triglycerides 262, HDL 51, LDL 97.  4. Severe COPD- smoking cessation 2014.  1. 10/17 CTA chest [place] severe centrilobar emphysema.  5. Hysterectomy.  6. Rotator cuff repair in 2000.            CC: 1 yr f/u CAD, HLD    Allergies  Allergies   Allergen Reactions   • Ceclor [Cefaclor]    • Cephalosporins Unknown - High Severity   • Codeine    • Diltiazem Swelling   • Olodaterol Unknown - High Severity   • Penicillins    • Statins    • Tiotropium Unknown - High Severity       Current Medications    Current Outpatient Medications:   •  ALPRAZolam (XANAX) 0.25 MG tablet, Take 1 tablet by mouth Every 8 (Eight) Hours., Disp: 90 tablet, Rfl: 2  •  aspirin 81 MG EC tablet, Take 1 tablet by mouth Daily., Disp: , Rfl:   •  Cholecalciferol 50 MCG (2000 UT) tablet, Take 2 tablets by mouth Daily., Disp: , Rfl:   •  FOLIC ACID PO, Take 800 mcg by mouth Daily. OTC, Disp: , Rfl:   •  ipratropium-albuterol (DUO-NEB) 0.5-2.5 mg/mL nebulizer, Take 3 mL by nebulization Every 4 (Four) Hours., Disp: , Rfl:   •  metoprolol tartrate (LOPRESSOR) 25 MG tablet, Take 25 mg by mouth 2 (Two) Times a Day., Disp: , Rfl:   •  mirtazapine (REMERON) 15 MG tablet, every night at bedtime., Disp: ,  "Rfl:   •  nitroglycerin (NITROSTAT) 0.4 MG SL tablet, Place 0.4 mg under the tongue As Needed for Chest Pain. Take no more than 3 doses in 15 minutes., Disp: , Rfl:   •  O2 (OXYGEN), 2 L/min., Disp: , Rfl:   •  Omega-3 Fatty Acids (FISH OIL) 1000 MG capsule capsule, Take 2,000 mg by mouth Daily., Disp: , Rfl:   •  PROAIR  (90 BASE) MCG/ACT inhaler, Inhale 1 puff As Needed., Disp: , Rfl:   •  vitamin B-12 (CYANOCOBALAMIN) 1000 MCG tablet, Take 1,000 mcg by mouth Every Other Day. mon-wed-fri, Disp: , Rfl:   •  Zinc 50 MG tablet, , Disp: , Rfl:       History of Present Illness   Precious Parmar is a 72 y.o. year old female here for telehealth follow up.  Patient expresses that she is largely been confined to the last 3 years due to COVID.  She misses seeing her family on a regular basis.  She is on 2 L nasal cannula oxygen regularly and recently had overnight oximetry  She has had no crescendo pattern of chest symptoms and her baseline dyspnea is unchanged        OBJECTIVE:  Vitals:    08/26/22 1415   BP: 106/53   BP Location: Left arm   Patient Position: Sitting   Pulse: 86   SpO2: 98%   Weight: 45.4 kg (100 lb)   Height: 151.1 cm (59.49\")     Body mass index is 19.87 kg/m².    Physical Exam    Diagnostic Data:  Procedures      ASSESSMENT:   Diagnosis Plan   1. Coronary artery disease involving native coronary artery of native heart without angina pectoris     2. Mixed hyperlipidemia         PLAN:  CAD post remote PCI no anginal equivalent continued GDMT    Dyslipidemia with significant weight reduction of statin therapy continued observation    End-stage COPD on home O2      This patient has consented to a telehealth visit via phone. The visit was scheduled as a telehealth visit to comply with patient safety concerns in accordance with CDC recommendations.  All vitals recorded within this visit are reported by the patient.  I spent  11 minutes in total including but not limited to the 15 minutes spent in direct " conversation with this patient.       Emir Lindquist MD, FACC

## 2022-09-16 ENCOUNTER — TELEMEDICINE (OUTPATIENT)
Dept: FAMILY MEDICINE CLINIC | Facility: CLINIC | Age: 72
End: 2022-09-16

## 2022-09-16 VITALS — HEIGHT: 59 IN | WEIGHT: 100 LBS | BODY MASS INDEX: 20.16 KG/M2

## 2022-09-16 DIAGNOSIS — R63.4 LOSS OF WEIGHT: ICD-10-CM

## 2022-09-16 DIAGNOSIS — J43.9 PULMONARY EMPHYSEMA, UNSPECIFIED EMPHYSEMA TYPE: ICD-10-CM

## 2022-09-16 DIAGNOSIS — H91.93 BILATERAL HEARING LOSS, UNSPECIFIED HEARING LOSS TYPE: ICD-10-CM

## 2022-09-16 DIAGNOSIS — I10 PRIMARY HYPERTENSION: Primary | ICD-10-CM

## 2022-09-16 DIAGNOSIS — F41.9 ANXIETY: ICD-10-CM

## 2022-09-16 PROCEDURE — 99214 OFFICE O/P EST MOD 30 MIN: CPT | Performed by: FAMILY MEDICINE

## 2022-09-16 RX ORDER — ALPRAZOLAM 0.25 MG/1
0.25 TABLET ORAL EVERY 8 HOURS
Qty: 90 TABLET | Refills: 2 | Status: SHIPPED | OUTPATIENT
Start: 2022-09-16 | End: 2022-12-15 | Stop reason: SDUPTHER

## 2022-09-16 RX ORDER — MIRTAZAPINE 15 MG/1
15 TABLET, FILM COATED ORAL
Qty: 90 TABLET | Refills: 1 | Status: SHIPPED | OUTPATIENT
Start: 2022-09-16 | End: 2023-03-20 | Stop reason: SDUPTHER

## 2022-09-16 NOTE — PROGRESS NOTES
Telehealth E-Visit      Patient Name: Precious Parmar  : 1950   MRN: [id number]     Chief Complaint:    Chief Complaint   Patient presents with   • COPD     Pt doing a follow up on COPD , my chart visit   • Med Refill     Medication refills and recheck       I have reviewed the E-Visit questionnaire and the patient's answers, my assessment and plan are listed below.   You have chosen to receive care through a telehealth visit.  Do you consent to use a video/audio connection for your medical care today?Yes     History of Present Illness: Precious Parmar is a 72 y.o. female who is here today to follow up with COPD and anxiety      Subjective      Review of Systems:   Review of Systems   Constitutional: Negative.    HENT: Negative.    Eyes: Negative.    Respiratory: Negative.    Cardiovascular: Negative.    Gastrointestinal: Negative.    Neurological: Negative.        I have reviewed and the following portions of the patient's history were updated as appropriate: past family history, past medical history, past social history, past surgical history and problem list.    Medications:     Current Outpatient Medications:   •  aspirin 81 MG EC tablet, Take 1 tablet by mouth Daily., Disp: , Rfl:   •  Cholecalciferol 50 MCG ( UT) tablet, Take 2 tablets by mouth Daily., Disp: , Rfl:   •  FOLIC ACID PO, Take 800 mcg by mouth Daily. OTC, Disp: , Rfl:   •  ipratropium-albuterol (DUO-NEB) 0.5-2.5 mg/mL nebulizer, Take 3 mL by nebulization Every 4 (Four) Hours., Disp: , Rfl:   •  metoprolol tartrate (LOPRESSOR) 25 MG tablet, Take 1 tablet by mouth 2 (Two) Times a Day., Disp: 180 tablet, Rfl: 1  •  mirtazapine (REMERON) 15 MG tablet, Take 1 tablet by mouth every night at bedtime., Disp: 90 tablet, Rfl: 1  •  nitroglycerin (NITROSTAT) 0.4 MG SL tablet, Place 0.4 mg under the tongue As Needed for Chest Pain. Take no more than 3 doses in 15 minutes., Disp: , Rfl:   •  O2 (OXYGEN), 2 L/min., Disp: , Rfl:   •  Omega-3 Fatty  "Acids (FISH OIL) 1000 MG capsule capsule, Take 2,000 mg by mouth Daily., Disp: , Rfl:   •  vitamin B-12 (CYANOCOBALAMIN) 1000 MCG tablet, Take 1,000 mcg by mouth Every Other Day. mon-wed-fri, Disp: , Rfl:   •  Zinc 50 MG tablet, , Disp: , Rfl:   •  albuterol sulfate  (90 Base) MCG/ACT inhaler, INHALE TWO PUFFS BY MOUTH EVERY 4 TO 6 HOURS AS NEEDED, Disp: 8.5 g, Rfl: 0  •  ALPRAZolam (XANAX) 0.25 MG tablet, Take 1 tablet by mouth Every 8 (Eight) Hours., Disp: 90 tablet, Rfl: 0    Allergies:   Allergies   Allergen Reactions   • Ceclor [Cefaclor]    • Cephalosporins Unknown - High Severity   • Codeine    • Diltiazem Swelling   • Olodaterol Unknown - High Severity   • Penicillins    • Statins    • Tiotropium Unknown - High Severity       Objective     Physical Exam:  Vital Signs:   Vitals:    09/16/22 1030   Weight: 45.4 kg (100 lb)   Height: 151.1 cm (59.49\")   PainSc: 0-No pain     Body mass index is 19.87 kg/m².    Physical Exam  Constitutional:       Appearance: Normal appearance. She is ill-appearing.   Neurological:      Mental Status: She is alert.   Psychiatric:         Mood and Affect: Mood normal.         Thought Content: Thought content normal.           Assessment / Plan      Assessment/Plan:   Diagnoses and all orders for this visit:    1. Primary hypertension (Primary)  Assessment & Plan:  Discussed with patient to monitor their blood pressure and if systolic blood pressure goes above 140 or diastolic is above 90 to return to clinic.  Take medicines as directed, call for any problems, patient not having or any worrisome symptoms.        Orders:  -     Lipid Panel; Future  -     Comprehensive Metabolic Panel; Future  -     TSH Rfx On Abnormal To Free T4; Future  -     CBC & Differential; Future    2. Anxiety  Assessment & Plan:  Refill Xanax.  House bill 1.    Orders:  -     Discontinue: ALPRAZolam (XANAX) 0.25 MG tablet; Take 1 tablet by mouth Every 8 (Eight) Hours.  Dispense: 90 tablet; Refill: " 2  -     Lipid Panel; Future  -     Comprehensive Metabolic Panel; Future  -     TSH Rfx On Abnormal To Free T4; Future  -     CBC & Differential; Future    3. Pulmonary emphysema, unspecified emphysema type (HCC)  Assessment & Plan:  Patient is chronically O2 dependent.  We will follow.      Orders:  -     Discontinue: ALPRAZolam (XANAX) 0.25 MG tablet; Take 1 tablet by mouth Every 8 (Eight) Hours.  Dispense: 90 tablet; Refill: 2  -     Lipid Panel; Future  -     Comprehensive Metabolic Panel; Future  -     TSH Rfx On Abnormal To Free T4; Future  -     CBC & Differential; Future    4. Loss of weight  Assessment & Plan:  Patient's weight is up 200 pounds.  Discussed with her appropriate diet.    Orders:  -     Lipid Panel; Future  -     Comprehensive Metabolic Panel; Future  -     TSH Rfx On Abnormal To Free T4; Future  -     CBC & Differential; Future    5. Bilateral hearing loss, unspecified hearing loss type  Assessment & Plan:  Refer to  On    Orders:  -     Ambulatory Referral to ENT (Otolaryngology)  -     Lipid Panel; Future  -     Comprehensive Metabolic Panel; Future  -     TSH Rfx On Abnormal To Free T4; Future  -     CBC & Differential; Future    Other orders  -     mirtazapine (REMERON) 15 MG tablet; Take 1 tablet by mouth every night at bedtime.  Dispense: 90 tablet; Refill: 1  -     metoprolol tartrate (LOPRESSOR) 25 MG tablet; Take 1 tablet by mouth 2 (Two) Times a Day.  Dispense: 180 tablet; Refill: 1           Follow Up:   Return in about 3 months (around 12/16/2022) for Bloodwork 1 week prior to next appointment.    Any medications prescribed have been sent electronically to   [facility] [id number] - [place], [place] - [address] [facility] [address] - [phone]  - [phone] FX  [address] [facility] [address] [address]  [place] [address]  Phone: [phone] Fax: [phone]      15 minutes were spent reviewing the patient's questionnaire, formulating a treatment plan, and relaying information to the patient via  Maurice.    Wero Daley MD  [facility]   12/29/22  10:21 EDT

## 2022-12-09 ENCOUNTER — LAB (OUTPATIENT)
Dept: FAMILY MEDICINE CLINIC | Facility: CLINIC | Age: 72
End: 2022-12-09

## 2022-12-09 DIAGNOSIS — H91.93 BILATERAL HEARING LOSS, UNSPECIFIED HEARING LOSS TYPE: ICD-10-CM

## 2022-12-09 DIAGNOSIS — R63.4 LOSS OF WEIGHT: ICD-10-CM

## 2022-12-09 DIAGNOSIS — J43.9 PULMONARY EMPHYSEMA, UNSPECIFIED EMPHYSEMA TYPE: ICD-10-CM

## 2022-12-09 DIAGNOSIS — I10 PRIMARY HYPERTENSION: ICD-10-CM

## 2022-12-09 DIAGNOSIS — F41.9 ANXIETY: ICD-10-CM

## 2022-12-09 PROCEDURE — 36415 COLL VENOUS BLD VENIPUNCTURE: CPT | Performed by: FAMILY MEDICINE

## 2022-12-10 LAB
ALBUMIN SERPL-MCNC: 4.5 G/DL (ref 3.7–4.7)
ALBUMIN/GLOB SERPL: 2.5 {RATIO} (ref 1.2–2.2)
ALP SERPL-CCNC: 93 IU/L (ref 44–121)
ALT SERPL-CCNC: 8 IU/L (ref 0–32)
AST SERPL-CCNC: 16 IU/L (ref 0–40)
BASOPHILS # BLD AUTO: 0 X10E3/UL (ref 0–0.2)
BASOPHILS NFR BLD AUTO: 0 %
BILIRUB SERPL-MCNC: 0.6 MG/DL (ref 0–1.2)
BUN SERPL-MCNC: 16 MG/DL (ref 8–27)
BUN/CREAT SERPL: 23 (ref 12–28)
CALCIUM SERPL-MCNC: 9.5 MG/DL (ref 8.7–10.3)
CHLORIDE SERPL-SCNC: 100 MMOL/L (ref 96–106)
CHOLEST SERPL-MCNC: 173 MG/DL (ref 100–199)
CO2 SERPL-SCNC: 29 MMOL/L (ref 20–29)
CREAT SERPL-MCNC: 0.71 MG/DL (ref 0.57–1)
EGFRCR SERPLBLD CKD-EPI 2021: 89 ML/MIN/1.73
EOSINOPHIL # BLD AUTO: 0.1 X10E3/UL (ref 0–0.4)
EOSINOPHIL NFR BLD AUTO: 2 %
ERYTHROCYTE [DISTWIDTH] IN BLOOD BY AUTOMATED COUNT: 11.9 % (ref 11.7–15.4)
GLOBULIN SER CALC-MCNC: 1.8 G/DL (ref 1.5–4.5)
GLUCOSE SERPL-MCNC: 116 MG/DL (ref 70–99)
HCT VFR BLD AUTO: 41.9 % (ref 34–46.6)
HDLC SERPL-MCNC: 64 MG/DL
HGB BLD-MCNC: 13.7 G/DL (ref 11.1–15.9)
IMM GRANULOCYTES # BLD AUTO: 0 X10E3/UL (ref 0–0.1)
IMM GRANULOCYTES NFR BLD AUTO: 0 %
LDLC SERPL CALC-MCNC: 93 MG/DL (ref 0–99)
LYMPHOCYTES # BLD AUTO: 1.1 X10E3/UL (ref 0.7–3.1)
LYMPHOCYTES NFR BLD AUTO: 17 %
MCH RBC QN AUTO: 30.7 PG (ref 26.6–33)
MCHC RBC AUTO-ENTMCNC: 32.7 G/DL (ref 31.5–35.7)
MCV RBC AUTO: 94 FL (ref 79–97)
MONOCYTES # BLD AUTO: 0.4 X10E3/UL (ref 0.1–0.9)
MONOCYTES NFR BLD AUTO: 7 %
NEUTROPHILS # BLD AUTO: 4.8 X10E3/UL (ref 1.4–7)
NEUTROPHILS NFR BLD AUTO: 74 %
PLATELET # BLD AUTO: 211 X10E3/UL (ref 150–450)
POTASSIUM SERPL-SCNC: 4.4 MMOL/L (ref 3.5–5.2)
PROT SERPL-MCNC: 6.3 G/DL (ref 6–8.5)
RBC # BLD AUTO: 4.46 X10E6/UL (ref 3.77–5.28)
SODIUM SERPL-SCNC: 144 MMOL/L (ref 134–144)
T4 FREE SERPL-MCNC: 1.36 NG/DL (ref 0.82–1.77)
TRIGL SERPL-MCNC: 85 MG/DL (ref 0–149)
TSH SERPL DL<=0.005 MIU/L-ACNC: 0.14 UIU/ML (ref 0.45–4.5)
VLDLC SERPL CALC-MCNC: 16 MG/DL (ref 5–40)
WBC # BLD AUTO: 6.4 X10E3/UL (ref 3.4–10.8)

## 2022-12-12 RX ORDER — ALBUTEROL SULFATE 90 UG/1
AEROSOL, METERED RESPIRATORY (INHALATION)
Qty: 8.5 G | Refills: 0 | Status: SHIPPED | OUTPATIENT
Start: 2022-12-12 | End: 2023-01-26

## 2022-12-12 NOTE — TELEPHONE ENCOUNTER
Rx Refill Note    Requested Prescriptions     Pending Prescriptions Disp Refills   • albuterol sulfate  (90 Base) MCG/ACT inhaler [Pharmacy Med Name: ALBUTEROL HFA 90 MCG INHALER] 8.5 g 0     Sig: INHALE TWO PUFFS BY MOUTH EVERY 4 TO 6 HOURS AS NEEDED        Last office visit with prescribing clinician: Visit date not found      Next office visit with prescribing clinician: 12/15/2022   Last labs:   Last refill: needs   Pharmacy (be sure to add in Epic). correct

## 2022-12-15 DIAGNOSIS — F41.9 ANXIETY: ICD-10-CM

## 2022-12-15 DIAGNOSIS — J43.9 PULMONARY EMPHYSEMA, UNSPECIFIED EMPHYSEMA TYPE: ICD-10-CM

## 2022-12-15 NOTE — TELEPHONE ENCOUNTER
Rx Refill Note    Requested Prescriptions     Pending Prescriptions Disp Refills   • ALPRAZolam (XANAX) 0.25 MG tablet 90 tablet 0     Sig: Take 1 tablet by mouth Every 8 (Eight) Hours.        Last office visit with prescribing clinician: Visit date not found      Next office visit with prescribing clinician: 12/23/2022   Last labs:   Last refill: 09/16/2022   Pharmacy (be sure to add in Epic). correct

## 2022-12-15 NOTE — TELEPHONE ENCOUNTER
Caller: Precious Parmar    Relationship: Self    Best call back number: [phone]   Requested Prescriptions:   Requested Prescriptions     Pending Prescriptions Disp Refills   • ALPRAZolam (XANAX) 0.25 MG tablet 90 tablet 2     Sig: Take 1 tablet by mouth Every 8 (Eight) Hours.        Pharmacy where request should be sent: [facility] [id number] [address] - [phone]  - [phone] FX     Additional details provided by patient: PATIENT HAS 4 DAY    Does the patient have less than a 3 day supply:  [] Yes  [x] No    Would you like a call back once the refill request has been completed: [x] Yes [] No    If the office needs to give you a call back, can they leave a voicemail: [x] Yes [] No    Sangeetha Ewing Rep   12/15/22 13:09 EST

## 2022-12-18 RX ORDER — ALPRAZOLAM 0.25 MG/1
0.25 TABLET ORAL EVERY 8 HOURS
Qty: 90 TABLET | Refills: 0 | Status: SHIPPED | OUTPATIENT
Start: 2022-12-18 | End: 2022-12-29 | Stop reason: SDUPTHER

## 2022-12-19 DIAGNOSIS — J43.9 PULMONARY EMPHYSEMA, UNSPECIFIED EMPHYSEMA TYPE: ICD-10-CM

## 2022-12-19 DIAGNOSIS — F41.9 ANXIETY: ICD-10-CM

## 2022-12-19 RX ORDER — ALPRAZOLAM 0.25 MG/1
0.25 TABLET ORAL EVERY 8 HOURS
Qty: 90 TABLET | Refills: 0 | OUTPATIENT
Start: 2022-12-19

## 2022-12-19 NOTE — TELEPHONE ENCOUNTER
Caller: Precious Parmar    Relationship: Self    Best call back number:[phone]    Requested Prescriptions:   Requested Prescriptions     Pending Prescriptions Disp Refills   • ALPRAZolam (XANAX) 0.25 MG tablet 90 tablet 0     Sig: Take 1 tablet by mouth Every 8 (Eight) Hours.        Pharmacy where request should be sent: [facility] [id number] [address] - [phone]  - [phone] FX     Additional details provided by patient: PATIENT OUT OF MEDICATION AFTER TODAY.      Does the patient have less than a 3 day supply:  [x] Yes  [] No    Would you like a call back once the refill request has been completed: [] Yes [x] No    If the office needs to give you a call back, can they leave a voicemail: [] Yes [x] No    Amena Hernandez   12/19/22 08:57 EST

## 2022-12-29 ENCOUNTER — TELEMEDICINE (OUTPATIENT)
Dept: FAMILY MEDICINE CLINIC | Facility: CLINIC | Age: 72
End: 2022-12-29

## 2022-12-29 VITALS
SYSTOLIC BLOOD PRESSURE: 124 MMHG | DIASTOLIC BLOOD PRESSURE: 66 MMHG | HEART RATE: 83 BPM | OXYGEN SATURATION: 97 % | TEMPERATURE: 97.4 F

## 2022-12-29 DIAGNOSIS — R63.4 LOSS OF WEIGHT: Primary | ICD-10-CM

## 2022-12-29 DIAGNOSIS — J43.9 PULMONARY EMPHYSEMA, UNSPECIFIED EMPHYSEMA TYPE: ICD-10-CM

## 2022-12-29 DIAGNOSIS — F41.9 ANXIETY: ICD-10-CM

## 2022-12-29 DIAGNOSIS — E05.90 HYPERTHYROIDISM: ICD-10-CM

## 2022-12-29 PROCEDURE — 99214 OFFICE O/P EST MOD 30 MIN: CPT | Performed by: FAMILY MEDICINE

## 2022-12-29 RX ORDER — ALPRAZOLAM 0.25 MG/1
0.25 TABLET ORAL EVERY 8 HOURS
Qty: 90 TABLET | Refills: 0 | Status: SHIPPED | OUTPATIENT
Start: 2022-12-29 | End: 2023-02-15 | Stop reason: SDUPTHER

## 2022-12-29 NOTE — ASSESSMENT & PLAN NOTE
Patient now weighs 92 pounds.  She has lost some weight taking care of her .  He has metastatic cancer.  We had a long discussion about her need to gain weight.  She does not want Megace at this time.  We will reweigh her in 3 weeks.  If she does not gain weight we will start Megace

## 2022-12-29 NOTE — PROGRESS NOTES
Telehealth E-Visit      Patient Name: Precious Parmar  : 1950   MRN: [id number]     Chief Complaint:    Chief Complaint   Patient presents with   • Anxiety   • Hypertension       I have reviewed the E-Visit questionnaire and the patient's answers, my assessment and plan are listed below.   You have chosen to receive care through a telehealth visit.  Do you consent to use a video/audio connection for your medical care today? yes    History of Present Illness: Precious Pramar is a 72 y.o. female who is here today to follow up with anxiety      Subjective      Review of Systems:   Review of Systems   Constitutional: Negative.    HENT: Negative.    Eyes: Negative.    Respiratory: Negative.    Cardiovascular: Negative.    Gastrointestinal: Negative.    Neurological: Negative.        I have reviewed and the following portions of the patient's history were updated as appropriate: past family history, past medical history, past social history, past surgical history and problem list.    Medications:     Current Outpatient Medications:   •  albuterol sulfate  (90 Base) MCG/ACT inhaler, INHALE TWO PUFFS BY MOUTH EVERY 4 TO 6 HOURS AS NEEDED, Disp: 8.5 g, Rfl: 0  •  ALPRAZolam (XANAX) 0.25 MG tablet, Take 1 tablet by mouth Every 8 (Eight) Hours., Disp: 90 tablet, Rfl: 0  •  aspirin 81 MG EC tablet, Take 1 tablet by mouth Daily., Disp: , Rfl:   •  Cholecalciferol 50 MCG (2000 UT) tablet, Take 2 tablets by mouth Daily., Disp: , Rfl:   •  FOLIC ACID PO, Take 800 mcg by mouth Daily. OTC, Disp: , Rfl:   •  ipratropium-albuterol (DUO-NEB) 0.5-2.5 mg/mL nebulizer, Take 3 mL by nebulization Every 4 (Four) Hours., Disp: , Rfl:   •  metoprolol tartrate (LOPRESSOR) 25 MG tablet, Take 1 tablet by mouth 2 (Two) Times a Day., Disp: 180 tablet, Rfl: 1  •  mirtazapine (REMERON) 15 MG tablet, Take 1 tablet by mouth every night at bedtime., Disp: 90 tablet, Rfl: 1  •  nitroglycerin (NITROSTAT) 0.4 MG SL tablet, Place 0.4 mg  under the tongue As Needed for Chest Pain. Take no more than 3 doses in 15 minutes., Disp: , Rfl:   •  O2 (OXYGEN), 2 L/min., Disp: , Rfl:   •  Omega-3 Fatty Acids (FISH OIL) 1000 MG capsule capsule, Take 2,000 mg by mouth Daily., Disp: , Rfl:   •  vitamin B-12 (CYANOCOBALAMIN) 1000 MCG tablet, Take 1,000 mcg by mouth Every Other Day. mon-wed-fri, Disp: , Rfl:   •  Zinc 50 MG tablet, , Disp: , Rfl:     Allergies:   Allergies   Allergen Reactions   • Ceclor [Cefaclor]    • Cephalosporins Unknown - High Severity   • Codeine    • Diltiazem Swelling   • Olodaterol Unknown - High Severity   • Penicillins    • Statins    • Tiotropium Unknown - High Severity       Objective     Physical Exam:  Vital Signs:   Vitals:    12/29/22 1148   BP: 124/66   Pulse: 83   Temp: 97.4 °F (36.3 °C)   SpO2: 97%     There is no height or weight on file to calculate BMI.    Physical Exam  Neurological:      Mental Status: She is alert.   Psychiatric:         Mood and Affect: Mood normal.         Behavior: Behavior normal.         Thought Content: Thought content normal.         Judgment: Judgment normal.           Assessment / Plan      Assessment/Plan:   Diagnoses and all orders for this visit:    1. Loss of weight (Primary)  Assessment & Plan:  Patient now weighs 92 pounds.  She has lost some weight taking care of her .  He has metastatic cancer.  We had a long discussion about her need to gain weight.  She does not want Megace at this time.  We will reweigh her in 3 weeks.  If she does not gain weight we will start Megace      2. Anxiety  Assessment & Plan:  Patient having to care for her .  Refill her Xanax.    Orders:  -     ALPRAZolam (XANAX) 0.25 MG tablet; Take 1 tablet by mouth Every 8 (Eight) Hours.  Dispense: 90 tablet; Refill: 0    3. Pulmonary emphysema, unspecified emphysema type (HCC)  Assessment & Plan:  Worsening a little.  We will try and keep her inside.      Orders:  -     ALPRAZolam (XANAX) 0.25 MG tablet;  Take 1 tablet by mouth Every 8 (Eight) Hours.  Dispense: 90 tablet; Refill: 0    4. Hyperthyroidism  Assessment & Plan:  TSH 0.138.  Free T4 1.36.  Recheck in 6 months.              Follow Up:   Return in about 3 weeks (around 1/19/2023) for Video visit.    Any medications prescribed have been sent electronically to   [facility] [id number] [place], [place] - [address] [facility] [address] [facility] [address] - [phone]  - [phone]   [address] [facility] [place] [address]  Phone: [phone] Fax: [phone]      15 minutes were spent reviewing the patient's questionnaire, formulating a treatment plan, and relaying information to the patient via NONOt.    Wero Daley MD  [facility]   12/29/22  11:51 EST

## 2023-01-11 ENCOUNTER — TELEPHONE (OUTPATIENT)
Dept: FAMILY MEDICINE CLINIC | Facility: CLINIC | Age: 73
End: 2023-01-11

## 2023-01-11 RX ORDER — IPRATROPIUM BROMIDE AND ALBUTEROL SULFATE 2.5; .5 MG/3ML; MG/3ML
3 SOLUTION RESPIRATORY (INHALATION) EVERY 4 HOURS
Qty: 360 ML | Refills: 3 | Status: SHIPPED | OUTPATIENT
Start: 2023-01-11 | End: 2023-01-13 | Stop reason: SDUPTHER

## 2023-01-11 NOTE — TELEPHONE ENCOUNTER
Caller: [facility] [phone] - [place] [id number] [facility] [address] - [phone] [facility] [phone]     Relationship: Pharmacy        What medications are you currently taking:   Current Outpatient Medications on File Prior to Visit   Medication Sig Dispense Refill   • albuterol sulfate  (90 Base) MCG/ACT inhaler INHALE TWO PUFFS BY MOUTH EVERY 4 TO 6 HOURS AS NEEDED 8.5 g 0   • ALPRAZolam (XANAX) 0.25 MG tablet Take 1 tablet by mouth Every 8 (Eight) Hours. 90 tablet 0   • aspirin 81 MG EC tablet Take 1 tablet by mouth Daily.     • Cholecalciferol 50 MCG (2000 UT) tablet Take 2 tablets by mouth Daily.     • FOLIC ACID PO Take 800 mcg by mouth Daily. OTC     • ipratropium-albuterol (DUO-NEB) 0.5-2.5 mg/3 ml nebulizer Take 3 mL by nebulization Every 4 (Four) Hours. 360 mL 3   • metoprolol tartrate (LOPRESSOR) 25 MG tablet Take 1 tablet by mouth 2 (Two) Times a Day. 180 tablet 1   • mirtazapine (REMERON) 15 MG tablet Take 1 tablet by mouth every night at bedtime. 90 tablet 1   • nitroglycerin (NITROSTAT) 0.4 MG SL tablet Place 0.4 mg under the tongue As Needed for Chest Pain. Take no more than 3 doses in 15 minutes.     • O2 (OXYGEN) 2 L/min.     • Omega-3 Fatty Acids (FISH OIL) 1000 MG capsule capsule Take 2,000 mg by mouth Daily.     • vitamin B-12 (CYANOCOBALAMIN) 1000 MCG tablet Take 1,000 mcg by mouth Every Other Day. mon-wed-fri     • Zinc 50 MG tablet      • [DISCONTINUED] ipratropium-albuterol (DUO-NEB) 0.5-2.5 mg/mL nebulizer Take 3 mL by nebulization Every 4 (Four) Hours.       No current facility-administered medications on file prior to visit.        What are your concerns: CALLED STATED THAT PT HAS MEDICARE SO REQUEST TO HAVE RX SENT BACK [facility] WITH DIAGNOSIS CODE.

## 2023-01-11 NOTE — TELEPHONE ENCOUNTER
Rx Refill Note    Requested Prescriptions     Pending Prescriptions Disp Refills   • ipratropium-albuterol (DUO-NEB) 0.5-2.5 mg/3 ml nebulizer 360 mL 3     Sig: Take 3 mL by nebulization Every 4 (Four) Hours.        Last office visit with prescribing clinician:    Next office visit with prescribing clinician:   Last labs:   Last refill:    Pharmacy

## 2023-01-13 NOTE — TELEPHONE ENCOUNTER
Rx Refill Note    Requested Prescriptions     Pending Prescriptions Disp Refills   • ipratropium-albuterol (DUO-NEB) 0.5-2.5 mg/3 ml nebulizer 360 mL 1     Sig: Take 3 mL by nebulization Every 4 (Four) Hours.        Last office visit with prescribing clinician: Visit date not found      Next office visit with prescribing clinician: Visit date not found   Last labs:   Last refill: needs   Pharmacy (be sure to add in Epic). correct

## 2023-01-16 RX ORDER — IPRATROPIUM BROMIDE AND ALBUTEROL SULFATE 2.5; .5 MG/3ML; MG/3ML
3 SOLUTION RESPIRATORY (INHALATION) EVERY 4 HOURS
Qty: 360 ML | Refills: 1 | Status: SHIPPED | OUTPATIENT
Start: 2023-01-16

## 2023-01-26 RX ORDER — ALBUTEROL SULFATE 90 UG/1
AEROSOL, METERED RESPIRATORY (INHALATION)
Qty: 8.5 G | Refills: 3 | Status: SHIPPED | OUTPATIENT
Start: 2023-01-26 | End: 2023-02-10

## 2023-01-26 NOTE — TELEPHONE ENCOUNTER
Rx Refill Note    Requested Prescriptions     Pending Prescriptions Disp Refills   • albuterol sulfate  (90 Base) MCG/ACT inhaler [Pharmacy Med Name: ALBUTEROL HFA 90 MCG INHALER] 8.5 g 0     Sig: INHALE TWO PUFFS BY MOUTH EVERY 4 TO 6 HOURS AS NEEDED        Last office visit with prescribing clinician: 03/17/2022    Next office visit with prescribing clinician: 3/20/2023   Last labs:   Last refill:    Pharmacy  [facility]

## 2023-02-10 RX ORDER — ALBUTEROL SULFATE 90 UG/1
2 AEROSOL, METERED RESPIRATORY (INHALATION) EVERY 4 HOURS PRN
COMMUNITY
End: 2023-02-10 | Stop reason: SDUPTHER

## 2023-02-10 RX ORDER — IPRATROPIUM BROMIDE AND ALBUTEROL SULFATE 2.5; .5 MG/3ML; MG/3ML
3 SOLUTION RESPIRATORY (INHALATION) EVERY 4 HOURS
Qty: 360 ML | Refills: 1 | Status: CANCELLED | OUTPATIENT
Start: 2023-02-10

## 2023-02-10 RX ORDER — ALBUTEROL SULFATE 90 UG/1
2 AEROSOL, METERED RESPIRATORY (INHALATION) EVERY 4 HOURS PRN
Qty: 8.5 G | Refills: 5 | Status: SHIPPED | OUTPATIENT
Start: 2023-02-10 | End: 2023-02-16

## 2023-02-15 DIAGNOSIS — J43.9 PULMONARY EMPHYSEMA, UNSPECIFIED EMPHYSEMA TYPE: ICD-10-CM

## 2023-02-15 DIAGNOSIS — F41.9 ANXIETY: ICD-10-CM

## 2023-02-15 RX ORDER — ALPRAZOLAM 0.25 MG/1
0.25 TABLET ORAL EVERY 8 HOURS
Qty: 90 TABLET | Refills: 2 | Status: SHIPPED | OUTPATIENT
Start: 2023-02-15 | End: 2023-03-20 | Stop reason: SDUPTHER

## 2023-02-15 NOTE — TELEPHONE ENCOUNTER
Rx Refill Note    Requested Prescriptions     Pending Prescriptions Disp Refills   • ALPRAZolam (XANAX) 0.25 MG tablet 90 tablet 2     Sig: Take 1 tablet by mouth Every 8 (Eight) Hours.        Last office visit with prescribing clinician: 12/29/2022     Next office visit with prescribing clinician: 3/20/2023   Last labs:   Last refill:    Pharmacy [facility]

## 2023-02-16 DIAGNOSIS — F41.9 ANXIETY: ICD-10-CM

## 2023-02-16 DIAGNOSIS — J43.9 PULMONARY EMPHYSEMA, UNSPECIFIED EMPHYSEMA TYPE: ICD-10-CM

## 2023-02-16 RX ORDER — ALBUTEROL SULFATE 90 UG/1
2 AEROSOL, METERED RESPIRATORY (INHALATION) EVERY 4 HOURS PRN
COMMUNITY
End: 2023-02-16 | Stop reason: SDUPTHER

## 2023-02-16 NOTE — TELEPHONE ENCOUNTER
Rx Refill Note    Requested Prescriptions     Pending Prescriptions Disp Refills   • albuterol sulfate  (90 Base) MCG/ACT inhaler 8 g 1     Sig: Inhale 2 puffs Every 4 (Four) Hours As Needed for Shortness of Air.   • ALPRAZolam (XANAX) 0.25 MG tablet 90 tablet 0     Sig: Take 1 tablet by mouth Every 8 (Eight) Hours.        Last office visit with prescribing clinician:      Next office visit with prescribing clinician: 3/20/2023   Last labs:   Last refill:    Pharmacy [facility]

## 2023-02-19 RX ORDER — ALBUTEROL SULFATE 90 UG/1
2 AEROSOL, METERED RESPIRATORY (INHALATION) EVERY 4 HOURS PRN
Qty: 8 G | Refills: 1 | Status: SHIPPED | OUTPATIENT
Start: 2023-02-19 | End: 2023-03-20 | Stop reason: SDUPTHER

## 2023-03-20 ENCOUNTER — TELEMEDICINE (OUTPATIENT)
Dept: FAMILY MEDICINE CLINIC | Facility: CLINIC | Age: 73
End: 2023-03-20
Payer: MEDICARE

## 2023-03-20 VITALS
OXYGEN SATURATION: 96 % | SYSTOLIC BLOOD PRESSURE: 131 MMHG | BODY MASS INDEX: 18.95 KG/M2 | HEART RATE: 89 BPM | HEIGHT: 59 IN | DIASTOLIC BLOOD PRESSURE: 64 MMHG | WEIGHT: 94 LBS

## 2023-03-20 DIAGNOSIS — R63.4 LOSS OF WEIGHT: ICD-10-CM

## 2023-03-20 DIAGNOSIS — F41.9 ANXIETY: ICD-10-CM

## 2023-03-20 DIAGNOSIS — F43.29 ADJUSTMENT DISORDER WITH DISTURBANCE OF EMOTION: ICD-10-CM

## 2023-03-20 DIAGNOSIS — Z00.00 MEDICARE ANNUAL WELLNESS VISIT, SUBSEQUENT: Primary | ICD-10-CM

## 2023-03-20 DIAGNOSIS — J43.9 PULMONARY EMPHYSEMA, UNSPECIFIED EMPHYSEMA TYPE: ICD-10-CM

## 2023-03-20 PROCEDURE — G0439 PPPS, SUBSEQ VISIT: HCPCS | Performed by: FAMILY MEDICINE

## 2023-03-20 PROCEDURE — 99214 OFFICE O/P EST MOD 30 MIN: CPT | Performed by: FAMILY MEDICINE

## 2023-03-20 PROCEDURE — 3075F SYST BP GE 130 - 139MM HG: CPT | Performed by: FAMILY MEDICINE

## 2023-03-20 PROCEDURE — 1170F FXNL STATUS ASSESSED: CPT | Performed by: FAMILY MEDICINE

## 2023-03-20 PROCEDURE — 3078F DIAST BP <80 MM HG: CPT | Performed by: FAMILY MEDICINE

## 2023-03-20 RX ORDER — ALPRAZOLAM 0.25 MG/1
0.25 TABLET ORAL EVERY 8 HOURS
Qty: 90 TABLET | Refills: 2 | Status: SHIPPED | OUTPATIENT
Start: 2023-03-20

## 2023-03-20 RX ORDER — ALBUTEROL SULFATE 90 UG/1
2 AEROSOL, METERED RESPIRATORY (INHALATION) EVERY 4 HOURS PRN
Qty: 8 G | Refills: 1 | Status: SHIPPED | OUTPATIENT
Start: 2023-03-20

## 2023-03-20 RX ORDER — MIRTAZAPINE 15 MG/1
15 TABLET, FILM COATED ORAL
Qty: 90 TABLET | Refills: 1 | Status: SHIPPED | OUTPATIENT
Start: 2023-03-20

## 2023-03-20 NOTE — PROGRESS NOTES
The ABCs of the Annual Wellness Visit  Subsequent Medicare Wellness Visit    Chief Complaint   Patient presents with   • Medicare Wellness-subsequent     Pt doing medicare by my chart due to being home bound   • Stress     Pt is down to 94lbs  is now in hospice      Subjective    History of Present Illness:  Precious Parmar is a 72 y.o. female who presents for a Subsequent Medicare Wellness Visit.    The following portions of the patient's history were reviewed and   updated as appropriate: allergies, current medications, past family history, past medical history, past social history, past surgical history and problem list.    Compared to one year ago, the patient feels her physical   health is the same.    Compared to one year ago, the patient feels her mental   health is worse.    Recent Hospitalizations:  She was not admitted to the hospital during the last year.       Current Medical Providers:  Patient Care Team:  Wero Daley MD as PCP - General (Family Medicine)  Emir Lindquist MD as Consulting Physician (Cardiology)    Outpatient Medications Prior to Visit   Medication Sig Dispense Refill   • albuterol sulfate  (90 Base) MCG/ACT inhaler Inhale 2 puffs Every 4 (Four) Hours As Needed for Shortness of Air. 8 g 1   • ALPRAZolam (XANAX) 0.25 MG tablet Take 1 tablet by mouth Every 8 (Eight) Hours. 90 tablet 2   • aspirin 81 MG EC tablet Take 1 tablet by mouth Daily.     • Cholecalciferol 50 MCG (2000 UT) tablet Take 2 tablets by mouth Daily.     • FOLIC ACID PO Take 800 mcg by mouth Daily. OTC     • ipratropium-albuterol (DUO-NEB) 0.5-2.5 mg/3 ml nebulizer Take 3 mL by nebulization Every 4 (Four) Hours. 360 mL 1   • metoprolol tartrate (LOPRESSOR) 25 MG tablet Take 1 tablet by mouth 2 (Two) Times a Day. 180 tablet 1   • mirtazapine (REMERON) 15 MG tablet Take 1 tablet by mouth every night at bedtime. 90 tablet 1   • nitroglycerin (NITROSTAT) 0.4 MG SL tablet Place 1 tablet under the tongue As  Needed for Chest Pain. Take no more than 3 doses in 15 minutes.     • O2 (OXYGEN) 2 L/min.     • Omega-3 Fatty Acids (FISH OIL) 1000 MG capsule capsule Take 2 capsules by mouth Daily.     • vitamin B-12 (CYANOCOBALAMIN) 1000 MCG tablet Take 1 tablet by mouth Every Other Day. mon-wed-fri     • Zinc 50 MG tablet        No facility-administered medications prior to visit.       No opioid medication identified on active medication list. I have reviewed chart for other potential  high risk medication/s and harmful drug interactions in the elderly.          Aspirin is on active medication list. Aspirin use is indicated based on review of current medical condition/s. Pros and cons of this therapy have been discussed today. Benefits of this medication outweigh potential harm.  Patient has been encouraged to continue taking this medication.  .      Patient Active Problem List   Diagnosis   • Allergic rhinitis   • CGD (chronic granulomatous disease) (HCC)   • Chronic respiratory failure (HCC)   • Chronic obstructive pulmonary emphysema (HCC)   • CAD (coronary artery disease)   • Cough   • Diverticular disease   • Dyslipidemia   • Dyspnea   • Hypertension   • Hypoxia   • Osteopenia   • Vitamin D deficiency   • History of pneumonia   • Anxiety   • Chronic constipation   • Chronic kidney disease, stage 3 (moderate)   • Diverticular disease of colon   • Encounter for immunization   • Bull hematuria   • High risk medication use   • History of renal calculi   • History of tobacco use disorder   • Hypoxemia   • Kidney stone   • Low vitamin B12 level   • Mixed hyperlipidemia   • Multiple renal cysts   • Muscle pain   • Non-toxic multinodular goiter   • Senile osteoporosis   • Urinary tract infectious disease   • Loss of weight   • Bilateral hearing loss   • Hyperthyroidism     Advance Care Planning  Advance Directive is not on file.  ACP discussion was held with the patient during this visit. Patient has an advance directive (not in  "EMR), copy requested.    Review of Systems   Constitutional: Positive for fatigue.   HENT: Negative.    Eyes: Negative.    Respiratory: Positive for cough and shortness of breath.    Cardiovascular: Negative.    Gastrointestinal: Negative.         Objective    Vitals:    03/20/23 1442   BP: 131/64  Comment: at home, pt is home bound   BP Location: Left arm   Patient Position: Sitting   Cuff Size: Adult   Pulse: 89   SpO2: 96%  Comment: 2 liters   Weight: 42.6 kg (94 lb)   Height: 149.9 cm (59\")   PainSc: 0-No pain     Estimated body mass index is 18.99 kg/m² as calculated from the following:    Height as of this encounter: 149.9 cm (59\").    Weight as of this encounter: 42.6 kg (94 lb).    BMI is within normal parameters. No other follow-up for BMI required.      Does the patient have evidence of cognitive impairment? No    Physical Exam  Vitals and nursing note reviewed.   Constitutional:       Appearance: Normal appearance. She is normal weight.   HENT:      Head: Normocephalic and atraumatic.      Right Ear: External ear normal.      Left Ear: External ear normal.      Nose: Nose normal.   Eyes:      Extraocular Movements: Extraocular movements intact.      Conjunctiva/sclera: Conjunctivae normal.   Pulmonary:      Effort: Pulmonary effort is normal.   Musculoskeletal:      Cervical back: Normal range of motion.   Feet:      Comments:      Neurological:      General: No focal deficit present.      Mental Status: She is alert and oriented to person, place, and time. Mental status is at baseline.   Psychiatric:         Mood and Affect: Mood normal.         Behavior: Behavior normal.         Thought Content: Thought content normal.         Judgment: Judgment normal.                 HEALTH RISK ASSESSMENT    Smoking Status:  Social History     Tobacco Use   Smoking Status Former   • Packs/day: 1.00   • Years: 46.00   • Pack years: 46.00   • Types: Cigarettes   • Start date: 1968   • Quit date: 2/2/2014   • Years " since quittin.1   Smokeless Tobacco Never     Alcohol Consumption:  Social History     Substance and Sexual Activity   Alcohol Use No     Fall Risk Screen:    STEADI Fall Risk Assessment was completed, and patient is at MODERATE risk for falls. Assessment completed on:3/20/2023    Depression Screening:  PHQ-2/PHQ-9 Depression Screening 3/20/2023   Little Interest or Pleasure in Doing Things 0-->not at all   Feeling Down, Depressed or Hopeless 0-->not at all   PHQ-9: Brief Depression Severity Measure Score 0       Health Habits and Functional and Cognitive Screening:  Functional & Cognitive Status 3/20/2023   Do you have difficulty preparing food and eating? No   Do you have difficulty bathing yourself, getting dressed or grooming yourself? No   Do you have difficulty using the toilet? No   Do you have difficulty moving around from place to place? No   Do you have trouble with steps or getting out of a bed or a chair? No   Current Diet Well Balanced Diet   Dental Exam Up to date   Eye Exam Up to date   Exercise (times per week) 0 times per week   Current Exercises Include No Regular Exercise   Do you need help using the phone?  No   Are you deaf or do you have serious difficulty hearing?  Yes   Do you need help with transportation? No   Do you need help shopping? Yes   Do you need help preparing meals?  No   Do you need help with housework?  Yes   Do you need help with laundry? Yes   Do you need help taking your medications? No   Do you need help managing money? No   Do you ever drive or ride in a car without wearing a seat belt? No   Have you felt unusual stress, anger or loneliness in the last month? Yes   Who do you live with? Sibling   If you need help, do you have trouble finding someone available to you? No   Have you been bothered in the last four weeks by sexual problems? No       Age-appropriate Screening Schedule:  Refer to the list below for future screening recommendations based on patient's age, sex  and/or medical conditions. Orders for these recommended tests are listed in the plan section. The patient has been provided with a written plan.    Health Maintenance   Topic Date Due   • COVID-19 Vaccine (5 - Booster for Moderna series) 10/14/2022   • ANNUAL WELLNESS VISIT  12/20/2022   • DXA SCAN  03/20/2023 (Originally 4/10/2020)   • ZOSTER VACCINE (1 of 2) 03/20/2023 (Originally 4/19/1969)   • COLORECTAL CANCER SCREENING  06/13/2023 (Originally 1950)   • TDAP/TD VACCINES (3 - Td or Tdap) 03/20/2024 (Originally 10/10/2022)   • LUNG CANCER SCREENING  03/20/2024 (Originally 9/24/2015)   • LIPID PANEL  12/09/2023   • MAMMOGRAM  06/06/2024   • HEPATITIS C SCREENING  Completed   • INFLUENZA VACCINE  Completed   • Pneumococcal Vaccine 65+  Completed              Assessment & Plan    Problem List Items Addressed This Visit        Endocrine and Metabolic    Loss of weight    Current Assessment & Plan     Will recheck in 2 months            Mental Health    Anxiety    Adjustment disorder with disturbance of emotion    Current Assessment & Plan     Pt's  is now in hospice, she is doing OK, is more accepting, will recheck in 2 months            Pulmonary and Pneumonias    Chronic obstructive pulmonary emphysema (HCC)    Current Assessment & Plan     Pt doing OK. Will follow          Other Visit Diagnoses     Medicare annual wellness visit, subsequent    -  Primary        CMS Preventative Services Quick Reference  Risk Factors Identified During Encounter  Fall Risk-High or Moderate: Discussed Fall Prevention in the home  The above risks/problems have been discussed with the patient.  Follow up actions/plans if indicated are seen below in the Assessment/Plan Section.  Pertinent information has been shared with the patient in the After Visit Summary.    There are no diagnoses linked to this encounter.    Follow Up:   No follow-ups on file.     An After Visit Summary and PPPS were made available to the  patient.          I spent 15 minutes caring for Precious on this date of service. This time includes time spent by me in the following activities:preparing for the visit, reviewing tests, obtaining and/or reviewing a separately obtained history, performing a medically appropriate examination and/or evaluation , counseling and educating the patient/family/caregiver, ordering medications, tests, or procedures, referring and communicating with other health care professionals , documenting information in the medical record, independently interpreting results and communicating that information with the patient/family/caregiver and care coordination

## 2023-04-24 DIAGNOSIS — F41.9 ANXIETY: ICD-10-CM

## 2023-04-24 DIAGNOSIS — J43.9 PULMONARY EMPHYSEMA, UNSPECIFIED EMPHYSEMA TYPE: ICD-10-CM

## 2023-04-24 RX ORDER — ALPRAZOLAM 0.25 MG/1
0.25 TABLET ORAL EVERY 8 HOURS
Qty: 90 TABLET | Refills: 2 | Status: SHIPPED | OUTPATIENT
Start: 2023-04-24

## 2023-04-24 NOTE — TELEPHONE ENCOUNTER
Rx Refill Note    Requested Prescriptions     Pending Prescriptions Disp Refills   • ALPRAZolam (XANAX) 0.25 MG tablet 90 tablet 2     Sig: Take 1 tablet by mouth Every 8 (Eight) Hours.        Last office visit with prescribing clinician: 03/20/2023    Next office visit with prescribing clinician: 5/30/2023   Last labs:   Last refill:    Pharmacy [facility]

## 2023-05-10 RX ORDER — ALBUTEROL SULFATE 90 UG/1
AEROSOL, METERED RESPIRATORY (INHALATION)
Qty: 18 G | Refills: 0 | OUTPATIENT
Start: 2023-05-10

## 2023-05-15 RX ORDER — ALBUTEROL SULFATE 2.5 MG/3ML
2.5 SOLUTION RESPIRATORY (INHALATION) EVERY 4 HOURS PRN
COMMUNITY
End: 2023-05-15 | Stop reason: SDUPTHER

## 2023-05-15 RX ORDER — ALBUTEROL SULFATE 2.5 MG/3ML
2.5 SOLUTION RESPIRATORY (INHALATION) EVERY 4 HOURS PRN
Qty: 8 EACH | Refills: 1 | Status: SHIPPED | OUTPATIENT
Start: 2023-05-15

## 2023-08-01 ENCOUNTER — TELEPHONE (OUTPATIENT)
Dept: FAMILY MEDICINE CLINIC | Facility: CLINIC | Age: 73
End: 2023-08-01
Payer: MEDICARE

## 2023-08-23 ENCOUNTER — TELEPHONE (OUTPATIENT)
Dept: CARDIOLOGY | Facility: CLINIC | Age: 73
End: 2023-08-23
Payer: MEDICARE

## 2023-08-31 NOTE — PROGRESS NOTES
[facility]  Office Progress Note  Precious Parmar  1950  [address]   [facility] [address]       Visit Date: 09/01/23    PCP: Wero Daley MD  [address] [facility] [address]    IDENTIFICATION: A 73 y.o. female retiree from [place], [place].   2023 released from  hospice 2019     PROBLEM LIST:   Coronary artery disease:  November 2006: [facility] with [facility]CA for septal  2.5 x 8 Cypher to ostium of LAD/RAMUS.  Normal LVEF.   April 2011: [facility] per [facility]:  Normal perfusion, ejection fraction preserved.  9/14 echo wnl IDD   Nicotine addiction:  February 2014 cessation.  Dyslipidemia:  February 2008:  Total cholesterol 200, HDL 67, triglycerides 219, LDL calculated at 89.     Total cholesterol 200, triglycerides 262, HDL 51, LDL 97.  6/23 168/69/76/79  Severe COPD- smoking cessation 2014.  10/17 CTA chest [place] severe centrilobar emphysema.  Hysterectomy.  Rotator cuff repair in 2000.        CC:   Chief Complaint   Patient presents with    Coronary Artery Disease       Allergies  Allergies   Allergen Reactions    Ceclor [Cefaclor]     Cephalosporins Unknown - High Severity    Codeine     Diltiazem Swelling    Olodaterol Unknown - High Severity    Penicillins     Statins     Tiotropium Unknown - High Severity       Current Medications    Current Outpatient Medications:     albuterol sulfate  (90 Base) MCG/ACT inhaler, Inhale 2 puffs Every 4 (Four) Hours As Needed., Disp: , Rfl:     ALPRAZolam (XANAX) 0.25 MG tablet, Take 1 tablet by mouth Every 8 (Eight) Hours., Disp: 90 tablet, Rfl: 2    aspirin 81 MG EC tablet, Take 1 tablet by mouth Daily., Disp: , Rfl:     Cholecalciferol 50 MCG (2000 UT) tablet, Take 2 tablets by mouth Daily., Disp: , Rfl:     FOLIC ACID PO, Take 800 mcg by mouth Daily. OTC, Disp: , Rfl:     ipratropium-albuterol (DUO-NEB) 0.5-2.5 mg/3 ml nebulizer, Take 3 mL by nebulization Every 4 (Four) Hours., Disp: 360 mL, Rfl: 1    metoprolol tartrate  "(LOPRESSOR) 25 MG tablet, Take 1 tablet by mouth 2 (Two) Times a Day., Disp: 180 tablet, Rfl: 1    mirtazapine (REMERON) 15 MG tablet, Take 1 tablet by mouth every night at bedtime., Disp: 90 tablet, Rfl: 1    nitroglycerin (NITROSTAT) 0.4 MG SL tablet, Place 1 tablet under the tongue As Needed for Chest Pain. Take no more than 3 doses in 15 minutes., Disp: , Rfl:     O2 (OXYGEN), 2 L/min., Disp: , Rfl:     Omega-3 Fatty Acids (FISH OIL) 1000 MG capsule capsule, Take 2 capsules by mouth Daily., Disp: , Rfl:     vitamin B-12 (CYANOCOBALAMIN) 1000 MCG tablet, Take 1 tablet by mouth Every Other Day. mon-wed-fri, Disp: , Rfl:     Zinc 50 MG tablet, , Disp: , Rfl:       History of Present Illness   Precious Parmar is a 73 y.o. year old female here for telehealth follow up on CAD.  Patient suffers from end-stage COPD on home supplemental oxygen.  She recently seen in [place] [facility] secondary to worsening dyspnea.  She essentially states that her emotional health was not good as her  passed 2 months prior        OBJECTIVE:  Vitals:    09/01/23 1443   BP: 116/63   BP Location: Left arm   Patient Position: Sitting   Pulse: 88   SpO2: 97%   Weight: 40.4 kg (89 lb)   Height: 149.9 cm (59\")     Body mass index is 17.98 kg/mý.    Physical Exam    Diagnostic Data:  Procedures        ASSESSMENT:   Diagnosis Plan   1. Coronary artery disease involving native coronary artery of native heart without angina pectoris        2. Primary hypertension            PLAN:  CAD post remote stenting no angina continued medical management    Mixed dyslipidemia controlled off medication with anorexia    O2 dependent COPD          Emir Lindquist MD, FACC  "

## 2023-09-01 ENCOUNTER — CLINICAL SUPPORT (OUTPATIENT)
Dept: CARDIOLOGY | Facility: CLINIC | Age: 73
End: 2023-09-01
Payer: MEDICARE

## 2023-09-01 VITALS
HEART RATE: 88 BPM | HEIGHT: 59 IN | WEIGHT: 89 LBS | DIASTOLIC BLOOD PRESSURE: 63 MMHG | OXYGEN SATURATION: 97 % | SYSTOLIC BLOOD PRESSURE: 116 MMHG | BODY MASS INDEX: 17.94 KG/M2

## 2023-09-01 DIAGNOSIS — I10 PRIMARY HYPERTENSION: ICD-10-CM

## 2023-09-01 DIAGNOSIS — I25.10 CORONARY ARTERY DISEASE INVOLVING NATIVE CORONARY ARTERY OF NATIVE HEART WITHOUT ANGINA PECTORIS: Primary | ICD-10-CM

## 2023-09-01 PROCEDURE — 99442 PR PHYS/QHP TELEPHONE EVALUATION 11-20 MIN: CPT | Performed by: INTERNAL MEDICINE

## 2023-09-23 ENCOUNTER — TELEMEDICINE (OUTPATIENT)
Dept: FAMILY MEDICINE CLINIC | Facility: CLINIC | Age: 73
End: 2023-09-23
Payer: MEDICARE

## 2023-09-23 VITALS
OXYGEN SATURATION: 97 % | HEART RATE: 87 BPM | DIASTOLIC BLOOD PRESSURE: 62 MMHG | SYSTOLIC BLOOD PRESSURE: 113 MMHG | BODY MASS INDEX: 18.18 KG/M2 | WEIGHT: 90 LBS

## 2023-09-23 DIAGNOSIS — I10 PRIMARY HYPERTENSION: ICD-10-CM

## 2023-09-23 DIAGNOSIS — F41.9 ANXIETY: ICD-10-CM

## 2023-09-23 DIAGNOSIS — F43.29 ADJUSTMENT DISORDER WITH DISTURBANCE OF EMOTION: Primary | ICD-10-CM

## 2023-09-23 DIAGNOSIS — J43.9 PULMONARY EMPHYSEMA, UNSPECIFIED EMPHYSEMA TYPE: ICD-10-CM

## 2023-09-23 DIAGNOSIS — J43.2 CENTRILOBULAR EMPHYSEMA: ICD-10-CM

## 2023-09-23 DIAGNOSIS — R63.4 LOSS OF WEIGHT: ICD-10-CM

## 2023-09-23 PROCEDURE — 99214 OFFICE O/P EST MOD 30 MIN: CPT | Performed by: FAMILY MEDICINE

## 2023-09-23 PROCEDURE — 3074F SYST BP LT 130 MM HG: CPT | Performed by: FAMILY MEDICINE

## 2023-09-23 PROCEDURE — 3078F DIAST BP <80 MM HG: CPT | Performed by: FAMILY MEDICINE

## 2023-09-23 RX ORDER — ALPRAZOLAM 0.25 MG/1
0.25 TABLET ORAL 4 TIMES DAILY PRN
Qty: 120 TABLET | Refills: 2 | Status: SHIPPED | OUTPATIENT
Start: 2023-09-23

## 2023-09-23 NOTE — ASSESSMENT & PLAN NOTE
Weighing 90 pounds.  She got down to 85 pounds when her  passed away.  She is doing better now.  Recheck in 2 months.

## 2023-09-23 NOTE — PROGRESS NOTES
Patient Name: Precious Parmar  : 1950   MRN: [id number]     Chief Complaint:  f/u on adjustment disorder  Chief Complaint   Patient presents with    Follow-up       History of Present Illness: Precious Parmar is a 73 y.o. female who is here today for follow up.  HPI        Review of Systems:   Review of Systems   Constitutional:  Positive for fatigue.   HENT: Negative.     Eyes: Negative.    Respiratory:  Positive for shortness of breath.    Cardiovascular: Negative.    Gastrointestinal: Negative.    Neurological: Negative.       Past Medical History:   Past Medical History:   Diagnosis Date    Age-related osteoporosis without current pathological fracture     Allergic rhinitis     Anxiety     AR (allergic rhinitis)     Chronic constipation     Chronic constipation     Chronic granulomatous disease     CKD (chronic kidney disease), stage III     COLD (chronic obstructive lung disease)     COPD (chronic obstructive pulmonary disease)     Coronary arteriosclerosis in native artery     Coronary artery disease     Diverticular disease of colon     Diverticulosis     SEVERE    Dyslipidemia     Emphysema lung     Endometriosis     High risk medication use     History of chest x-ray 2014    chronic change, no active disease     History of chest x-ray 2014    no acute change; findings consistent with COPD     History of chest x-ray 2014    no evidence of acute cardiopulmonary disease     History of echocardiogram 2014    EF 50-55%; E to A reversal in MV flow pattern suggestive of diastolic dysfunction    History of PFTs 2015    FVC 2.29 L 89%, FEV1 0.67 L, 34%, ratio 29%, after Xopenex HFA FEV1 improves to 0.81 L or 42%    History of PFTs 06/15/2015    data is acceptable and reproducible; pt given 4 puffs of albuterol; pt gave good effort.     History of PFTs 2014    data is acceptable and reproducible; pt given 3 puffs of xopenex; pt gave good effort     Hyperlipidemia      MIXED    Hypertension     Hypothyroidism     Hypoxemia requiring supplemental oxygen     Kidney stone     Muscle pain     Myalgia     ARTHRALGIA    Nephrolithiasis     Non-toxic multinodular goiter 2017    NORMAL THYROID FUNCTION    Osteoporosis     Panacinar emphysema     Renal mass     L/LOWER    Senile osteoporosis     Statin intolerance     Tobacco use disorder     QUIT     Vitamin B12 deficiency     Vitamin D deficiency        Past Surgical History:   Past Surgical History:   Procedure Laterality Date    CARDIAC CATHETERIZATION      CORONARY ANGIOPLASTY WITH STENT PLACEMENT      Status post diagnostic left heart catheterization, selective coronary angiography, left ventriculography, and stenting on 06, performed by Dr. Emir Lindquist.B.    HYSTERECTOMY      Age 20    OOPHORECTOMY      Age 20    ROTATOR CUFF REPAIR      TONSILLECTOMY         Family History:   Family History   Problem Relation Age of Onset    Heart attack Mother     Stroke Mother     Hypertension Mother     Arthritis Mother     Heart disease Mother     Kidney disease Mother     Arthritis Father     Cancer Brother     Hypertension Brother     Hypertension Brother     Breast cancer Paternal Aunt         age unknown     Cancer Other     Ovarian cancer Neg Hx        Social History:   Social History     Socioeconomic History    Marital status:    Tobacco Use    Smoking status: Former     Packs/day: 1.00     Years: 46.00     Pack years: 46.00     Types: Cigarettes     Start date:      Quit date: 2014     Years since quittin.6    Smokeless tobacco: Never   Vaping Use    Vaping Use: Never used    Passive vaping exposure: Yes   Substance and Sexual Activity    Alcohol use: No    Drug use: No    Sexual activity: Never       Medications:     Current Outpatient Medications:     albuterol sulfate  (90 Base) MCG/ACT inhaler, Inhale 2 puffs Every 4 (Four) Hours As Needed., Disp: , Rfl:     ALPRAZolam (XANAX) 0.25 MG  tablet, Take 1 tablet by mouth 4 (Four) Times a Day As Needed for Anxiety., Disp: 120 tablet, Rfl: 2    aspirin 81 MG EC tablet, Take 1 tablet by mouth Daily., Disp: , Rfl:     Cholecalciferol 50 MCG (2000 UT) tablet, Take 2 tablets by mouth Daily., Disp: , Rfl:     FOLIC ACID PO, Take 800 mcg by mouth Daily. OTC, Disp: , Rfl:     ipratropium-albuterol (DUO-NEB) 0.5-2.5 mg/3 ml nebulizer, Take 3 mL by nebulization Every 4 (Four) Hours., Disp: 360 mL, Rfl: 1    metoprolol tartrate (LOPRESSOR) 25 MG tablet, Take 1 tablet by mouth 2 (Two) Times a Day., Disp: 180 tablet, Rfl: 1    mirtazapine (REMERON) 15 MG tablet, Take 1 tablet by mouth every night at bedtime., Disp: 90 tablet, Rfl: 1    nitroglycerin (NITROSTAT) 0.4 MG SL tablet, Place 1 tablet under the tongue As Needed for Chest Pain. Take no more than 3 doses in 15 minutes., Disp: , Rfl:     O2 (OXYGEN), 2 L/min., Disp: , Rfl:     Omega-3 Fatty Acids (FISH OIL) 1000 MG capsule capsule, Take 2 capsules by mouth Daily., Disp: , Rfl:     vitamin B-12 (CYANOCOBALAMIN) 1000 MCG tablet, Take 1 tablet by mouth Every Other Day. mon-wed-fri, Disp: , Rfl:     Zinc 50 MG tablet, , Disp: , Rfl:     Allergies:   Allergies   Allergen Reactions    Ceclor [Cefaclor]     Cephalosporins Unknown - High Severity    Codeine     Diltiazem Swelling    Olodaterol Unknown - High Severity    Penicillins     Statins     Tiotropium Unknown - High Severity         Physical Exam:  Vital Signs:   Vitals:    09/23/23 1014   BP: 113/62   Pulse: 87   SpO2: 97%   Weight: 40.8 kg (90 lb)     Body mass index is 18.18 kg/m².     Physical Exam  Vitals and nursing note reviewed.   Constitutional:       Appearance: Normal appearance. She is ill-appearing.   HENT:      Head: Normocephalic and atraumatic.      Right Ear: External ear normal.      Left Ear: External ear normal.      Nose: Nose normal.   Eyes:      Extraocular Movements: Extraocular movements intact.      Conjunctiva/sclera: Conjunctivae  normal.   Pulmonary:      Effort: Pulmonary effort is normal.   Musculoskeletal:      Cervical back: Normal range of motion.   Feet:      Comments:      Neurological:      General: No focal deficit present.      Mental Status: She is alert and oriented to person, place, and time. Mental status is at baseline.   Psychiatric:         Mood and Affect: Mood normal.         Behavior: Behavior normal.         Thought Content: Thought content normal.         Judgment: Judgment normal.       Procedures      Assessment/Plan:   Diagnoses and all orders for this visit:    1. Adjustment disorder with disturbance of emotion (Primary)    2. Centrilobular emphysema    3. Loss of weight  Assessment & Plan:  Weighing 90 pounds.  She got down to 85 pounds when her  passed away.  She is doing better now.  Recheck in 2 months.      4. Primary hypertension  Assessment & Plan:  Discussed with patient to monitor their blood pressure and if systolic blood pressure goes above 140 or diastolic is above 90 to return to clinic.  Take medicines as directed, call for any problems, patient not having or any worrisome symptoms.          5. Anxiety  -     ALPRAZolam (XANAX) 0.25 MG tablet; Take 1 tablet by mouth 4 (Four) Times a Day As Needed for Anxiety.  Dispense: 120 tablet; Refill: 2    6. Pulmonary emphysema, unspecified emphysema type  -     ALPRAZolam (XANAX) 0.25 MG tablet; Take 1 tablet by mouth 4 (Four) Times a Day As Needed for Anxiety.  Dispense: 120 tablet; Refill: 2             Follow Up:   Return in about 2 months (around 11/23/2023) for Annual physical.    Wero Daley MD  [facility]

## 2023-09-23 NOTE — ASSESSMENT & PLAN NOTE
Patient recently lost her .  They have been  for 60 years.  She has had increase her Xanax to 4 times a day a couple times.  I am going to go ahead and called in for her 4 times a day.  She has severe COPD and I do not know if she is in a make it much longer and I do not want her suffering.  We will recheck in 2 months.  Sometime in the future we may decrease down to 3 times a day.  It would depend on how she is doing.

## 2023-09-26 RX ORDER — MIRTAZAPINE 15 MG/1
15 TABLET, FILM COATED ORAL
Qty: 30 TABLET | Refills: 0 | Status: SHIPPED | OUTPATIENT
Start: 2023-09-26 | End: 2023-09-27 | Stop reason: SDUPTHER

## 2023-09-26 NOTE — TELEPHONE ENCOUNTER
Rx Refill Note    Requested Prescriptions     Pending Prescriptions Disp Refills    mirtazapine (REMERON) 15 MG tablet [Pharmacy Med Name: MIRTAZAPINE 15 MG TABS 15 Tablet] 30 tablet 0     Sig: TAKE 1 TABLET BY MOUTH EVERY NIGHT AT BEDTIME        Last office visit with prescribing clinician: 6/21/2023      Next office visit with prescribing clinician: 11/21/2023   Last labs:   Last refill: 03/20/2023   Pharmacy (be sure to add in Epic). correct

## 2023-09-27 DIAGNOSIS — F43.29 ADJUSTMENT DISORDER WITH DISTURBANCE OF EMOTION: Primary | ICD-10-CM

## 2023-09-27 RX ORDER — MIRTAZAPINE 15 MG/1
15 TABLET, FILM COATED ORAL
Qty: 30 TABLET | Refills: 0 | Status: SHIPPED | OUTPATIENT
Start: 2023-09-27

## 2023-10-23 DIAGNOSIS — F43.29 ADJUSTMENT DISORDER WITH DISTURBANCE OF EMOTION: ICD-10-CM

## 2023-10-23 DIAGNOSIS — I10 PRIMARY HYPERTENSION: Primary | ICD-10-CM

## 2023-10-23 RX ORDER — MIRTAZAPINE 15 MG/1
15 TABLET, FILM COATED ORAL
Qty: 90 TABLET | Refills: 1 | Status: SHIPPED | OUTPATIENT
Start: 2023-10-23

## 2023-11-13 DIAGNOSIS — R06.2 WHEEZING: Primary | ICD-10-CM

## 2023-11-13 RX ORDER — ALBUTEROL SULFATE 90 UG/1
2 AEROSOL, METERED RESPIRATORY (INHALATION) EVERY 4 HOURS PRN
Qty: 18 G | Refills: 2 | Status: CANCELLED | OUTPATIENT
Start: 2023-11-13

## 2023-11-13 RX ORDER — ALBUTEROL SULFATE 90 UG/1
2 AEROSOL, METERED RESPIRATORY (INHALATION) EVERY 4 HOURS PRN
COMMUNITY
End: 2023-11-13 | Stop reason: SDUPTHER

## 2023-11-13 RX ORDER — ALBUTEROL SULFATE 90 UG/1
2 AEROSOL, METERED RESPIRATORY (INHALATION) EVERY 4 HOURS PRN
Qty: 18 G | Refills: 10 | Status: SHIPPED | OUTPATIENT
Start: 2023-11-13

## 2023-11-21 ENCOUNTER — TELEMEDICINE (OUTPATIENT)
Dept: FAMILY MEDICINE CLINIC | Facility: CLINIC | Age: 73
End: 2023-11-21
Payer: MEDICARE

## 2023-11-21 VITALS
BODY MASS INDEX: 18.18 KG/M2 | SYSTOLIC BLOOD PRESSURE: 128 MMHG | HEART RATE: 87 BPM | DIASTOLIC BLOOD PRESSURE: 71 MMHG | WEIGHT: 90 LBS | OXYGEN SATURATION: 94 %

## 2023-11-21 DIAGNOSIS — J43.2 CENTRILOBULAR EMPHYSEMA: ICD-10-CM

## 2023-11-21 DIAGNOSIS — R79.89 LOW VITAMIN B12 LEVEL: ICD-10-CM

## 2023-11-21 DIAGNOSIS — F41.9 ANXIETY: ICD-10-CM

## 2023-11-21 DIAGNOSIS — N18.31 STAGE 3A CHRONIC KIDNEY DISEASE: ICD-10-CM

## 2023-11-21 DIAGNOSIS — E78.2 MIXED HYPERLIPIDEMIA: ICD-10-CM

## 2023-11-21 DIAGNOSIS — E55.9 VITAMIN D DEFICIENCY: ICD-10-CM

## 2023-11-21 DIAGNOSIS — I10 PRIMARY HYPERTENSION: Primary | ICD-10-CM

## 2023-11-21 NOTE — PROGRESS NOTES
Telehealth E-Visit      Patient Name: Precious Parmar  : 1950   MRN: [id number]     Chief Complaint:    Chief Complaint   Patient presents with    Follow-up       I have reviewed the E-Visit questionnaire and the patient's answers, my assessment and plan are listed below.   You have chosen to receive care through a telehealth visit.  Do you consent to use a video/audio connection for your medical care today? Yes     History of Present Illness: Precious Parmar is a 73 y.o. female who is here today to follow up with adjustment and breathing      Subjective      Review of Systems:   Review of Systems    I have reviewed and the following portions of the patient's history were updated as appropriate: past family history, past medical history, past social history, past surgical history and problem list.    Medications:     Current Outpatient Medications:     albuterol sulfate  (90 Base) MCG/ACT inhaler, Inhale 2 puffs Every 4 (Four) Hours As Needed for Wheezing. Lupin  brand please, Disp: 18 g, Rfl: 10    ALPRAZolam (XANAX) 0.25 MG tablet, Take 1 tablet by mouth 4 (Four) Times a Day As Needed for Anxiety., Disp: 120 tablet, Rfl: 2    aspirin 81 MG EC tablet, Take 1 tablet by mouth Daily., Disp: , Rfl:     Cholecalciferol 50 MCG (2000 UT) tablet, Take 2 tablets by mouth Daily., Disp: , Rfl:     FOLIC ACID PO, Take 800 mcg by mouth Daily. OTC, Disp: , Rfl:     ipratropium-albuterol (DUO-NEB) 0.5-2.5 mg/3 ml nebulizer, Take 3 mL by nebulization Every 4 (Four) Hours., Disp: 360 mL, Rfl: 1    metoprolol tartrate (LOPRESSOR) 25 MG tablet, Take 1 tablet by mouth 2 (Two) Times a Day., Disp: 180 tablet, Rfl: 1    mirtazapine (REMERON) 15 MG tablet, Take 1 tablet by mouth every night at bedtime., Disp: 90 tablet, Rfl: 1    nitroglycerin (NITROSTAT) 0.4 MG SL tablet, Place 1 tablet under the tongue As Needed for Chest Pain. Take no more than 3 doses in 15 minutes., Disp: , Rfl:     O2 (OXYGEN), 2 L/min., Disp: ,  Rfl:     Omega-3 Fatty Acids (FISH OIL) 1000 MG capsule capsule, Take 2 capsules by mouth Daily., Disp: , Rfl:     vitamin B-12 (CYANOCOBALAMIN) 1000 MCG tablet, Take 1 tablet by mouth Every Other Day. mon-wed-fri, Disp: , Rfl:     Zinc 50 MG tablet, , Disp: , Rfl:     Allergies:   Allergies   Allergen Reactions    Ceclor [Cefaclor]     Cephalosporins Unknown - High Severity    Codeine     Diltiazem Swelling    Olodaterol Unknown - High Severity    Penicillins     Statins     Tiotropium Unknown - High Severity       Objective     Physical Exam:  Vital Signs:   Vitals:    11/21/23 1058   BP: 128/71   Pulse: 87   SpO2: 94%   Weight: 40.8 kg (90 lb)     Body mass index is 18.18 kg/m².    Physical Exam    Assessment / Plan      Assessment/Plan:   Diagnoses and all orders for this visit:    1. Primary hypertension (Primary)  Assessment & Plan:  Discussed with patient to monitor their blood pressure and if systolic blood pressure goes above 140 or diastolic is above 90 to return to clinic.  Take medicines as directed, call for any problems, patient not having or any worrisome symptoms.          2. Mixed hyperlipidemia  Assessment & Plan:  Blood work in 3 months      3. Low vitamin B12 level  Assessment & Plan:  Blood work in 3 months      4. Vitamin D deficiency  Assessment & Plan:  Blood work in 3 months      5. Stage 3a chronic kidney disease  Assessment & Plan:  Blood work in 3 months.Patient is instructed to not take any NSAIDs.  Medicines as directed.  Stay well-hydrated.        6. Anxiety  Assessment & Plan:  Refill Xanax.  Recheck in 3 months.      7. Centrilobular emphysema  Assessment & Plan:  Pt is O2 dependent, can't drive, Pt needs COVID, Flu, and RSV vaccines           Follow Up:   Return in about 1 month (around 12/21/2023) for Annual physical.    Any medications prescribed have been sent electronically to   [address] [phone] [facility] [phone] [address]  [facility] [address]  Phone: [phone] Fax: [phone]      20 minutes were spent reviewing the patient's questionnaire, formulating a treatment plan, and relaying information to the patient via popchipshart.    Wero Daley MD  [facility]   11/21/23  11:11 EST  Answers submitted by the patient for this visit:  Primary Reason for Visit (Submitted on 11/14/2023)  What is the primary reason for your visit?: Shortness of Breath  Shortness of Breath Questionnaire (Submitted on 11/14/2023)  Chief Complaint: Shortness of breath  Chronicity: chronic  Onset: more than 1 year ago  Frequency: daily  Progression since onset: gradually worsening  claudication: No  coryza: No  hemoptysis: No  leg pain: No  orthopnea: No  PND: No  sputum production: No  swollen glands: No  syncope: No  Aggravating factors: any activity, pollens, weather changes

## 2023-12-09 ENCOUNTER — TELEPHONE (OUTPATIENT)
Dept: FAMILY MEDICINE CLINIC | Facility: CLINIC | Age: 73
End: 2023-12-09
Payer: MEDICARE

## 2023-12-09 ENCOUNTER — TELEPHONE (OUTPATIENT)
Dept: CARDIOLOGY | Facility: CLINIC | Age: 73
End: 2023-12-09
Payer: MEDICARE

## 2023-12-09 NOTE — TELEPHONE ENCOUNTER
PT CONTACTED. PT STILL DIDN'T WANT TO GO TO ER BUT SHE WAS GOING TO WAIT ON HER SON I URGED PT TO CALL 911 AND I EVEN OFFERED TO CALL MYSELF BUT SHE WAS HESITANT.

## 2023-12-09 NOTE — TELEPHONE ENCOUNTER
Received phone call from patients son aydee this morning stating she has had some chest pain and went to ED yesterday where he states she had a normal workup. He was wanting advice on his mothers complaint. I advised given our office is closed on the weekend that if she has continued complaints to go back to the ED. Additionally I advised that this patient has never been seen in our office by our providers and her last visit with a provider within [facility] was more than 1 year ago. I advised he can call Monday when we open and we would be happy to see her. He was agreeable for this.

## 2023-12-09 NOTE — TELEPHONE ENCOUNTER
PATIENT CALLED REPORTING SHE IS HAVING CHEST PAIN. REFUSED TO GO TO ER. WENT TO ER LAST NIGHT THEY OFFERED TO ADMIT HER SHE REFUSED. SHE WAS REFERRED TO [facility]. SHE IS STILL EXPERIENCING CHEST PAIN, AND DID AGREE IF IT CONTINUES OR GETS WORSE SHE WOULD CALL AND AMBULANCE. SHE JUST WANTED.       PT. REQUESTED TO BE CONTACTED [phone]

## 2023-12-11 ENCOUNTER — TELEPHONE (OUTPATIENT)
Dept: CARDIOLOGY | Facility: CLINIC | Age: 73
End: 2023-12-11

## 2023-12-11 ENCOUNTER — OFFICE VISIT (OUTPATIENT)
Dept: CARDIOLOGY | Facility: CLINIC | Age: 73
End: 2023-12-11
Payer: MEDICARE

## 2023-12-11 VITALS
OXYGEN SATURATION: 95 % | HEIGHT: 59 IN | SYSTOLIC BLOOD PRESSURE: 110 MMHG | HEART RATE: 104 BPM | WEIGHT: 84 LBS | RESPIRATION RATE: 18 BRPM | DIASTOLIC BLOOD PRESSURE: 81 MMHG | BODY MASS INDEX: 16.93 KG/M2

## 2023-12-11 DIAGNOSIS — J43.9 PULMONARY EMPHYSEMA, UNSPECIFIED EMPHYSEMA TYPE: ICD-10-CM

## 2023-12-11 DIAGNOSIS — I25.112 CORONARY ARTERY DISEASE INVOLVING NATIVE CORONARY ARTERY OF NATIVE HEART WITH REFRACTORY ANGINA PECTORIS: Primary | ICD-10-CM

## 2023-12-11 PROCEDURE — 93000 ELECTROCARDIOGRAM COMPLETE: CPT | Performed by: INTERNAL MEDICINE

## 2023-12-11 PROCEDURE — 3074F SYST BP LT 130 MM HG: CPT | Performed by: INTERNAL MEDICINE

## 2023-12-11 PROCEDURE — 99214 OFFICE O/P EST MOD 30 MIN: CPT | Performed by: INTERNAL MEDICINE

## 2023-12-11 PROCEDURE — 1159F MED LIST DOCD IN RCRD: CPT | Performed by: INTERNAL MEDICINE

## 2023-12-11 PROCEDURE — 3079F DIAST BP 80-89 MM HG: CPT | Performed by: INTERNAL MEDICINE

## 2023-12-11 PROCEDURE — 1160F RVW MEDS BY RX/DR IN RCRD: CPT | Performed by: INTERNAL MEDICINE

## 2023-12-11 RX ORDER — METOPROLOL SUCCINATE 25 MG/1
25 TABLET, EXTENDED RELEASE ORAL DAILY
Qty: 90 TABLET | Refills: 3 | Status: SHIPPED | OUTPATIENT
Start: 2023-12-11

## 2023-12-11 RX ORDER — RANOLAZINE 500 MG/1
500 TABLET, EXTENDED RELEASE ORAL 2 TIMES DAILY
Qty: 30 TABLET | Refills: 11 | Status: SHIPPED | OUTPATIENT
Start: 2023-12-11

## 2023-12-11 RX ORDER — NITROGLYCERIN 0.4 MG/1
TABLET SUBLINGUAL
Qty: 100 TABLET | Refills: 11 | Status: SHIPPED | OUTPATIENT
Start: 2023-12-11

## 2023-12-11 RX ORDER — ASPIRIN 81 MG/1
81 TABLET ORAL DAILY
Qty: 90 TABLET | Refills: 3 | Status: SHIPPED | OUTPATIENT
Start: 2023-12-11

## 2023-12-11 NOTE — PROGRESS NOTES
[facility]  [facility] CARDIOLOGY  [address] [place] [address]  Dept: [phone]  Dept Fax: [phone]    Date: 12/11/2023  Patient: Precious Parmar  YOB: 1950    New Patient Office Note    Consult Reason:  Ms. Precious Parmar is a 73 y.o. female who presents to the clinic to establish care, seen for Chest Pain.   Patient complaining of severe chest pain on walking few steps, substernal, tight pressure, associated with shortness of breath, relieved by rest.  Patient denies orthopnea, PND, palpitations, lightheadedness, syncope or medications side-effects.    The following portions of the patient's history were reviewed and updated as appropriate: allergies, current medications, past family history, past medical history, past social history, past surgical history, and problem list.    Medications:   Allergies   Allergen Reactions    Ceclor [Cefaclor]     Cephalosporins Unknown - High Severity    Codeine     Diltiazem Swelling    Olodaterol Unknown - High Severity    Penicillins     Statins     Tiotropium Unknown - High Severity      Current Outpatient Medications   Medication Instructions    albuterol sulfate  (90 Base) MCG/ACT inhaler 2 puffs, Inhalation, Every 4 Hours PRN, Lupin  brand please    ALPRAZolam (XANAX) 0.25 mg, Oral, 4 Times Daily PRN    aspirin 81 mg, Oral, Daily    Cholecalciferol 50 MCG (2000 UT) tablet 2 tablets, Oral, Daily    fish oil 2,000 mg, Oral, Daily    FOLIC ACID  mcg, Oral, Daily, OTC     ipratropium-albuterol (DUO-NEB) 0.5-2.5 mg/3 ml nebulizer 3 mL, Nebulization, Every 4 Hours    metoprolol succinate XL (TOPROL-XL) 25 mg, Oral, Daily    mirtazapine (REMERON) 15 mg, Oral, Every Night at Bedtime    nitroglycerin (NITROSTAT) 0.4 MG SL tablet 1 under the tongue as needed for angina, may repeat q5mins for up three doses    O2 (OXYGEN) 2 L/min.    ranolazine (RANEXA) 500 mg, Oral, 2 Times Daily    vitamin B-12  (CYANOCOBALAMIN) 1,000 mcg, Oral, Every Other Day, mon-wed-fri    Zinc 50 MG tablet No dose, route, or frequency recorded.       Subjective  Past Medical History:   Diagnosis Date    Age-related osteoporosis without current pathological fracture     Allergic rhinitis     Anxiety     AR (allergic rhinitis)     Chronic constipation     Chronic constipation     Chronic granulomatous disease     CKD (chronic kidney disease), stage III     COLD (chronic obstructive lung disease)     COPD (chronic obstructive pulmonary disease)     Coronary arteriosclerosis in native artery     Coronary artery disease     Diverticular disease of colon     Diverticulosis     SEVERE    Dyslipidemia     Emphysema lung     Endometriosis     High risk medication use     History of chest x-ray 09/22/2014    chronic change, no active disease     History of chest x-ray 03/22/2014    no acute change; findings consistent with COPD     History of chest x-ray 03/19/2014    no evidence of acute cardiopulmonary disease     History of echocardiogram 09/29/2014    EF 50-55%; E to A reversal in MV flow pattern suggestive of diastolic dysfunction    History of PFTs 11/20/2015    FVC 2.29 L 89%, FEV1 0.67 L, 34%, ratio 29%, after Xopenex HFA FEV1 improves to 0.81 L or 42%    History of PFTs 06/15/2015    data is acceptable and reproducible; pt given 4 puffs of albuterol; pt gave good effort.     History of PFTs 11/20/2014    data is acceptable and reproducible; pt given 3 puffs of xopenex; pt gave good effort     Hyperlipidemia     MIXED    Hypertension     Hypothyroidism     Hypoxemia requiring supplemental oxygen     Kidney stone     Muscle pain     Myalgia     ARTHRALGIA    Nephrolithiasis     Non-toxic multinodular goiter 03/2017    NORMAL THYROID FUNCTION    Osteoporosis     Panacinar emphysema     Renal mass     L/LOWER    Senile osteoporosis     Statin intolerance     Tobacco use disorder     QUIT 2014    Vitamin B12 deficiency     Vitamin D  "deficiency        Past Surgical History:   Procedure Laterality Date    CARDIAC CATHETERIZATION      CORONARY ANGIOPLASTY WITH STENT PLACEMENT      Status post diagnostic left heart catheterization, selective coronary angiography, left ventriculography, and stenting on 06, performed by Dr. Emir Lindquist.B.    HYSTERECTOMY      Age 20    OOPHORECTOMY      Age 20    ROTATOR CUFF REPAIR  2000    TONSILLECTOMY         Family History   Problem Relation Age of Onset    Heart attack Mother     Stroke Mother     Hypertension Mother     Arthritis Mother     Heart disease Mother     Kidney disease Mother     Arthritis Father     Cancer Brother     Hypertension Brother     Hypertension Brother     Breast cancer Paternal Aunt         age unknown     Cancer Other     Ovarian cancer Neg Hx         Social History     Socioeconomic History    Marital status:    Tobacco Use    Smoking status: Former     Packs/day: 1.00     Years: 46.00     Additional pack years: 0.00     Total pack years: 46.00     Types: Cigarettes     Start date:      Quit date: 2014     Years since quittin.8    Smokeless tobacco: Never   Vaping Use    Vaping Use: Never used    Passive vaping exposure: Yes   Substance and Sexual Activity    Alcohol use: No    Drug use: No    Sexual activity: Never       Objective  Vitals:    23 1457   BP: 110/81   BP Location: Right arm   Patient Position: Sitting   Cuff Size: Adult   Pulse: 104   Resp: 18   SpO2: 95%   Weight: 38.1 kg (84 lb)   Height: 149.9 cm (59\")     Vitals:    23 1457   BP: 110/81   BP Location: Right arm   Patient Position: Sitting   Cuff Size: Adult   Pulse: 104   Resp: 18   SpO2: 95%   Weight: 38.1 kg (84 lb)   Height: 149.9 cm (59\")        Physical Exam  Constitutional:       Appearance: Healthy appearance. Not in distress.   Eyes:      Pupils: Pupils are equal, round, and reactive to light.   HENT:    Mouth/Throat:      Mouth: Mucous membranes are moist.   Neck:      " "Vascular: No carotid bruit, hepatojugular reflux, JVD or JVR. JVD normal.   Pulmonary:      Effort: Pulmonary effort is normal.      Breath sounds: Decreased breath sounds present. Wheezing present. No rales.   Chest:      Chest wall: Not tender to palpatation.   Cardiovascular:      PMI at left midclavicular line. Normal rate. Regular rhythm. Normal S1 with normal intensity. Normal S2 with normal intensity.       Murmurs: There is no murmur.      No gallop.  No click. No rub.   Pulses:     Carotid: 4+ bilaterally.     Radial: 4+ bilaterally.     Popliteal: 4+ bilaterally.     Dorsalis pedis: 4+ bilaterally.  Edema:     Peripheral edema absent.   Abdominal:      General: There is no abdominal bruit.   Skin:     General: Skin is warm.   Neurological:      Mental Status: Alert and oriented to person, place and time.          Labs:  Lab Results   Component Value Date     06/21/2023    K TNP 06/21/2023    CL 97 06/21/2023    CO2 21 06/21/2023    BUN 21 06/21/2023    CREATININE 0.74 06/21/2023    CALCIUM 9.8 06/21/2023    BILITOT 0.3 06/21/2023    ALKPHOS 105 06/21/2023    ALT 15 06/21/2023    AST 19 06/21/2023    GLUCOSE TNP 06/21/2023    ALBUMIN 4.8 (H) 06/21/2023     Lab Results   Component Value Date    WBC 9.2 06/21/2023    HGB 14.1 06/21/2023    HCT 42.6 06/21/2023     06/21/2023     No results found for: \"APTT\", \"INR\", \"PTT\"  No results found for: \"CKTOTAL\", \"TROPONINI\", \"TROPONINT\", \"CKMBINDEX\", \"BNP\"  No results found for: \"BNP\", \"PROBNP\"    Lab Results   Component Value Date    CHLPL 168 06/21/2023    TRIG 69 06/21/2023    HDL 76 06/21/2023    LDL 79 06/21/2023     Lab Results   Component Value Date    TSH 0.681 06/21/2023    FREET4 1.36 12/09/2022       The ASCVD Risk score (Christianne HOLLOWAY, et al., 2019) failed to calculate for the following reasons:    The patient has a prior MI or stroke diagnosis     CV Diagnostics:    ECG 12 Lead    Date/Time: 12/11/2023 4:17 PM  Performed by: Kenn Heard, " MD    Authorized by: Kenn Heard MD  Comparison: compared with previous ECG   Similar to previous ECG  Rhythm: sinus tachycardia  Conduction: left anterior fascicular block  Other findings: low voltage and poor R wave progression          ECHO/MUGA: No results found for this or any previous visit.     STRESS TESTS: No results found for this or any previous visit.     CARDIAC CATH: No results found for this or any previous visit.     DEVICES: No valid procedures specified.   HOLTER: No results found for this or any previous visit.     CT/MRI:  No results found for this or any previous visit.    VASCULAR: No valid procedures specified.     Assessment and Plan  Diagnoses and all orders for this visit:    1. Coronary artery disease involving native coronary artery of native heart with refractory angina pectoris (Primary)  Assessment & Plan:  Coronary artery disease:  November 2006: [facility] with PTCA for septal  2.5 x 8 Cypher to ostium of LAD/RAMUS.  Normal LVEF.   April 2011: MPS per [facility]:  Normal perfusion, ejection fraction preserved.  9/14 echo wnl IDD  Coronary artery disease is worsening. CCS 3 angina, with chest pain walking few steps.  Medication changes per orders.  Severe emphysema complicating the presentation, and patient visibly short of breath and uncomfortable at rest.  Patient would need cardiac workup with Lexiscan nuclear stress test and echocardiogram.  If unable to complete the stress test, patient would warrant cardiac catheterization in view of severity of symptoms.  High risk in view of weight loss/cachexia/poor appetite and severe emphysema.  Stop metoprolol tartrate and start metoprolol succinate 25 mg 1 tab daily, in view of low blood pressure and severe COPD.  Continue aspirin 81 mg p.o. daily.  Unable to give statin in view of side effects (severe muscle pain and cramps, on three different type of statins).  LDL to goal so deferring ezetimibe for now.  Start amlodipine 5 mg 1 tab  twice daily for severe symptoms.  Cardiac status will be reassessed in 1 month.    Orders:  -     metoprolol succinate XL (TOPROL-XL) 25 MG 24 hr tablet; Take 1 tablet by mouth Daily.  Dispense: 90 tablet; Refill: 3  -     aspirin 81 MG EC tablet; Take 1 tablet by mouth Daily.  Dispense: 90 tablet; Refill: 3  -     ranolazine (RANEXA) 500 MG 12 hr tablet; Take 1 tablet by mouth 2 (Two) Times a Day.  Dispense: 30 tablet; Refill: 11  -     nitroglycerin (NITROSTAT) 0.4 MG SL tablet; 1 under the tongue as needed for angina, may repeat q5mins for up three doses  Dispense: 100 tablet; Refill: 11  -     Stress Test With Myocardial Perfusion One Day; Future  -     Adult Transthoracic Echo Complete W/ Cont if Necessary Per Protocol; Future  -     ECG 12 Lead    2. Pulmonary emphysema, unspecified emphysema type  Assessment & Plan:  COPD is worsening.  Patient to keep COPD diary.  Discussed monitoring symptoms and use of quick-relief medications and contacting us early in the course of exacerbations.  Continue current medications.  Patient to discuss with her PCP regarding long-acting inhalers, needed for severe COPD/emphysema but unable to afford.  Chronic steroid therapy an option as well, suboptimal in view of her osteoporosis and low weight. Consider pulmonary referral in view of complexity.  Decrease and change metoprolol to succinate ER 25 mg 1 tab daily in view of severe obstructive symptoms and low normal blood pressure.             Return in about 4 weeks (around 1/8/2024) for follow-up clinically and on any tests ordered.    There are no Patient Instructions on file for this visit.    Kenn Heard MD

## 2023-12-11 NOTE — TELEPHONE ENCOUNTER
[facility] [place] [facility] 7am arrival for patient. Instructions reviewed.

## 2023-12-11 NOTE — ASSESSMENT & PLAN NOTE
COPD is worsening.  Patient to keep COPD diary.  Discussed monitoring symptoms and use of quick-relief medications and contacting us early in the course of exacerbations.  Continue current medications.  Patient to discuss with her PCP regarding long-acting inhalers, needed for severe COPD/emphysema but unable to afford.  Chronic steroid therapy an option as well, suboptimal in view of her osteoporosis and low weight. Consider pulmonary referral in view of complexity.  Decrease and change metoprolol to succinate ER 25 mg 1 tab daily in view of severe obstructive symptoms and low normal blood pressure.

## 2023-12-11 NOTE — TELEPHONE ENCOUNTER
Caller: Jose Parmar    Relationship to patient: Emergency Contact    Best call back number: [phone] SON [phone]    Chief complaint: OUT BREATHE, HURTS WHEN SHE IS UP MOVING.    Type of visit: HOSPITAL FOLLOW UP    Requested date: ASAP       Additional notes:PATIENT IS NEEDING TO BE SEEN, PATIENTS SON TALKED TO LNECHO FERREIRA AND WAS TOLD TO CALL OFFICE AND THAT PATIENT WOULD BE SEEN.  PLEASE REACH OUT TO PATIENTS SON TO SCHEDULE.

## 2023-12-11 NOTE — ASSESSMENT & PLAN NOTE
Coronary artery disease:  November 2006: [facility] with PTCA for septal  2.5 x 8 Cypher to ostium of LAD/RAMUS.  Normal LVEF.   April 2011: MPS per [facility]:  Normal perfusion, ejection fraction preserved.  9/14 echo wnl IDD  Coronary artery disease is worsening. CCS 3 angina, with chest pain walking few steps.  Medication changes per orders.  Severe emphysema complicating the presentation, and patient visibly short of breath and uncomfortable at rest.  Patient would need cardiac workup with Lexiscan nuclear stress test and echocardiogram.  If unable to complete the stress test, patient would warrant cardiac catheterization in view of severity of symptoms.  High risk in view of weight loss/cachexia/poor appetite and severe emphysema.  Stop metoprolol tartrate and start metoprolol succinate 25 mg 1 tab daily, in view of low blood pressure and severe COPD.  Continue aspirin 81 mg p.o. daily.  Unable to give statin in view of side effects (severe muscle pain and cramps, on three different type of statins).  LDL to goal so deferring ezetimibe for now.  Start amlodipine 5 mg 1 tab twice daily for severe symptoms.  Cardiac status will be reassessed in 1 month.   Writer perfect served MD on call notifying of poss fluid overload.  Pt with 2-3+ pitting edema to bilat lateral hips and left upper extremity as well.  Per MD ok to pause IVF until day team comes to assess. Oncoming RN made aware.

## 2023-12-12 ENCOUNTER — TELEPHONE (OUTPATIENT)
Dept: CARDIOLOGY | Facility: CLINIC | Age: 73
End: 2023-12-12
Payer: MEDICARE

## 2023-12-12 NOTE — TELEPHONE ENCOUNTER
Please call son Jose to discuss EKG results. Per patient has concerns regarding EKG results before her nuclear stress tomorrow at [facility]. [phone].

## 2023-12-12 NOTE — TELEPHONE ENCOUNTER
Approvedtoday  [employer]an[employer]   [place] Case: [id number], Status: Approved, Coverage Starts on: 9/12/2023 12:00:00 AM, Coverage Ends on: 12/11/2024 12:00:00 AM.

## 2023-12-13 ENCOUNTER — TELEPHONE (OUTPATIENT)
Dept: CARDIOLOGY | Facility: CLINIC | Age: 73
End: 2023-12-13

## 2023-12-13 ENCOUNTER — OUTSIDE FACILITY SERVICE (OUTPATIENT)
Dept: CARDIOLOGY | Facility: CLINIC | Age: 73
End: 2023-12-13
Payer: MEDICARE

## 2023-12-13 PROCEDURE — 93018 CV STRESS TEST I&R ONLY: CPT | Performed by: INTERNAL MEDICINE

## 2023-12-13 PROCEDURE — 78452 HT MUSCLE IMAGE SPECT MULT: CPT | Performed by: INTERNAL MEDICINE

## 2023-12-13 NOTE — TELEPHONE ENCOUNTER
HAD MENTIONED THAT THEY WOULD RECEIVE A CALL REGARDING THE TEST DONE THIS MORNING - HADNT HEARD ANYTHING AND WANTED TO CHECK IN

## 2023-12-21 ENCOUNTER — TELEPHONE (OUTPATIENT)
Dept: CARDIOLOGY | Facility: CLINIC | Age: 73
End: 2023-12-21
Payer: MEDICARE

## 2023-12-21 NOTE — TELEPHONE ENCOUNTER
PT SAID SHE WAS NOT INFORMED OF HIS NOTES - LOOKED AT DOCUMENTATION IN EPIC AND SHARED WITH PT - SHE WANTS TO DISCUSS HER TESTING/RESULTS   - PLEASE ADVISE

## 2023-12-21 NOTE — TELEPHONE ENCOUNTER
----- Message from Kenn Heard MD sent at 12/20/2023  9:51 PM EST -----  Please let patient know the study was incomplete due to poor window- heart appears to be normal function. Mitral valve with mild to moderate leakage, likely from wear and tear related to age. Follow-up echo in 2 years.

## 2023-12-21 NOTE — TELEPHONE ENCOUNTER
Spoke with PT and let patient know the study was incomplete due to poor window- heart appears to be normal function. Mitral valve with mild to moderate leakage, likely from wear and tear related to age. Follow-up echo in 2 years.

## 2023-12-21 NOTE — TELEPHONE ENCOUNTER
PT states that the chest pain is no longer present, however upon exertion she is still having some tightness in her chest. PT states that she is SOA before the chest tightness happens across the top of her breast bone.   Please advise

## 2023-12-22 ENCOUNTER — OFFICE VISIT (OUTPATIENT)
Dept: FAMILY MEDICINE CLINIC | Facility: CLINIC | Age: 73
End: 2023-12-22
Payer: MEDICARE

## 2023-12-22 VITALS
OXYGEN SATURATION: 93 % | WEIGHT: 85 LBS | RESPIRATION RATE: 16 BRPM | BODY MASS INDEX: 17.14 KG/M2 | DIASTOLIC BLOOD PRESSURE: 80 MMHG | HEART RATE: 85 BPM | SYSTOLIC BLOOD PRESSURE: 130 MMHG | HEIGHT: 59 IN

## 2023-12-22 DIAGNOSIS — F41.9 ANXIETY: Primary | ICD-10-CM

## 2023-12-22 DIAGNOSIS — E78.2 MIXED HYPERLIPIDEMIA: ICD-10-CM

## 2023-12-22 DIAGNOSIS — R63.4 LOSS OF WEIGHT: ICD-10-CM

## 2023-12-22 DIAGNOSIS — J43.2 CENTRILOBULAR EMPHYSEMA: ICD-10-CM

## 2023-12-22 DIAGNOSIS — E78.5 DYSLIPIDEMIA: ICD-10-CM

## 2023-12-22 DIAGNOSIS — R79.89 LOW VITAMIN B12 LEVEL: ICD-10-CM

## 2023-12-22 DIAGNOSIS — F43.29 ADJUSTMENT DISORDER WITH DISTURBANCE OF EMOTION: ICD-10-CM

## 2023-12-22 DIAGNOSIS — I10 PRIMARY HYPERTENSION: ICD-10-CM

## 2023-12-22 DIAGNOSIS — I25.10 CORONARY ARTERY DISEASE INVOLVING NATIVE HEART, UNSPECIFIED VESSEL OR LESION TYPE, UNSPECIFIED WHETHER ANGINA PRESENT: ICD-10-CM

## 2023-12-22 DIAGNOSIS — J43.9 PULMONARY EMPHYSEMA, UNSPECIFIED EMPHYSEMA TYPE: ICD-10-CM

## 2023-12-22 RX ORDER — MEGESTROL ACETATE 40 MG/ML
800 SUSPENSION ORAL DAILY
Qty: 600 ML | Refills: 3 | Status: SHIPPED | OUTPATIENT
Start: 2023-12-22

## 2023-12-22 RX ORDER — ALPRAZOLAM 0.25 MG/1
0.25 TABLET ORAL 4 TIMES DAILY PRN
Qty: 120 TABLET | Refills: 2 | Status: SHIPPED | OUTPATIENT
Start: 2023-12-22

## 2023-12-22 NOTE — ASSESSMENT & PLAN NOTE
Patient is oxygen dependent.  We talked about getting her immunizations.  She is only getting 1 at a time.

## 2023-12-22 NOTE — TELEPHONE ENCOUNTER
Spoke with patient and let pt  know that she is limited by her lung status and her old heart attack.  When exerting herself she should keep it to low to moderate intensity activities at most, and feel tightness or gets tired to stop the activity, rest and then go back to her activity with a low-grade of intensity, when she is fully recovered. PT verbalized her understanding.

## 2023-12-22 NOTE — ASSESSMENT & PLAN NOTE
Refill patient's Xanax.  We had a long discussion about her living by herself.  Make sure house is safe.

## 2023-12-22 NOTE — PROGRESS NOTES
Patient Name: Precious Parmar  : 1950   MRN: [id number]     Chief Complaint:    Chief Complaint   Patient presents with    Follow-up       History of Present Illness: Precious Parmar is a 73 y.o. female who is here today for follow up.  HPI        Review of Systems:   Review of Systems   Constitutional:  Positive for fatigue.   HENT: Negative.     Eyes: Negative.    Respiratory:  Positive for shortness of breath.    Cardiovascular: Negative.    Gastrointestinal: Negative.    Neurological: Negative.         Past Medical History:   Past Medical History:   Diagnosis Date    Age-related osteoporosis without current pathological fracture     Allergic rhinitis     Anxiety     AR (allergic rhinitis)     Chronic constipation     Chronic constipation     Chronic granulomatous disease     CKD (chronic kidney disease), stage III     COLD (chronic obstructive lung disease)     COPD (chronic obstructive pulmonary disease)     Coronary arteriosclerosis in native artery     Coronary artery disease     Diverticular disease of colon     Diverticulosis     SEVERE    Dyslipidemia     Emphysema lung     Encounter for immunization 2018    Endometriosis     High risk medication use     History of chest x-ray 2014    chronic change, no active disease     History of chest x-ray 2014    no acute change; findings consistent with COPD     History of chest x-ray 2014    no evidence of acute cardiopulmonary disease     History of echocardiogram 2014    EF 50-55%; E to A reversal in MV flow pattern suggestive of diastolic dysfunction    History of PFTs 2015    FVC 2.29 L 89%, FEV1 0.67 L, 34%, ratio 29%, after Xopenex HFA FEV1 improves to 0.81 L or 42%    History of PFTs 06/15/2015    data is acceptable and reproducible; pt given 4 puffs of albuterol; pt gave good effort.     History of PFTs 2014    data is acceptable and reproducible; pt given 3 puffs of xopenex; pt gave good effort      Hyperlipidemia     MIXED    Hypertension     Hypothyroidism     Hypoxemia requiring supplemental oxygen     Kidney stone     Muscle pain     Myalgia     ARTHRALGIA    Nephrolithiasis     Non-toxic multinodular goiter 2017    NORMAL THYROID FUNCTION    Osteoporosis     Panacinar emphysema     Renal mass     L/LOWER    Senile osteoporosis     Statin intolerance     Tobacco use disorder     QUIT     Vitamin B12 deficiency     Vitamin D deficiency        Past Surgical History:   Past Surgical History:   Procedure Laterality Date    CARDIAC CATHETERIZATION      CORONARY ANGIOPLASTY WITH STENT PLACEMENT      Status post diagnostic left heart catheterization, selective coronary angiography, left ventriculography, and stenting on 06, performed by Dr. Emir Lindquist.B.    HYSTERECTOMY      Age 20    OOPHORECTOMY      Age 20    ROTATOR CUFF REPAIR      TONSILLECTOMY         Family History:   Family History   Problem Relation Age of Onset    Heart attack Mother     Stroke Mother     Hypertension Mother     Arthritis Mother     Heart disease Mother     Kidney disease Mother     Arthritis Father     Cancer Brother     Hypertension Brother     Hypertension Brother     Breast cancer Paternal Aunt         age unknown     Cancer Other     Ovarian cancer Neg Hx        Social History:   Social History     Socioeconomic History    Marital status:    Tobacco Use    Smoking status: Former     Packs/day: 1.00     Years: 46.00     Additional pack years: 0.00     Total pack years: 46.00     Types: Cigarettes     Start date:      Quit date: 2014     Years since quittin.8    Smokeless tobacco: Never   Vaping Use    Vaping Use: Never used    Passive vaping exposure: Yes   Substance and Sexual Activity    Alcohol use: No    Drug use: No    Sexual activity: Never       Medications:     Current Outpatient Medications:     albuterol sulfate  (90 Base) MCG/ACT inhaler, Inhale 2 puffs Every 4 (Four) Hours As  "Needed for Wheezing. Lupin  brand please, Disp: 18 g, Rfl: 10    ALPRAZolam (XANAX) 0.25 MG tablet, Take 1 tablet by mouth 4 (Four) Times a Day As Needed for Anxiety., Disp: 120 tablet, Rfl: 2    aspirin 81 MG EC tablet, Take 1 tablet by mouth Daily., Disp: 90 tablet, Rfl: 3    Cholecalciferol 50 MCG (2000 UT) tablet, Take 2 tablets by mouth Daily., Disp: , Rfl:     FOLIC ACID PO, Take 800 mcg by mouth Daily. OTC, Disp: , Rfl:     ipratropium-albuterol (DUO-NEB) 0.5-2.5 mg/3 ml nebulizer, Take 3 mL by nebulization Every 4 (Four) Hours., Disp: 360 mL, Rfl: 1    metoprolol succinate XL (TOPROL-XL) 25 MG 24 hr tablet, Take 1 tablet by mouth Daily., Disp: 90 tablet, Rfl: 3    mirtazapine (REMERON) 15 MG tablet, Take 1 tablet by mouth every night at bedtime., Disp: 90 tablet, Rfl: 1    nitroglycerin (NITROSTAT) 0.4 MG SL tablet, 1 under the tongue as needed for angina, may repeat q5mins for up three doses, Disp: 100 tablet, Rfl: 11    O2 (OXYGEN), 2 L/min., Disp: , Rfl:     Omega-3 Fatty Acids (FISH OIL) 1000 MG capsule capsule, Take 2 capsules by mouth Daily., Disp: , Rfl:     ranolazine (RANEXA) 500 MG 12 hr tablet, Take 1 tablet by mouth 2 (Two) Times a Day., Disp: 30 tablet, Rfl: 11    vitamin B-12 (CYANOCOBALAMIN) 1000 MCG tablet, Take 1 tablet by mouth Every Other Day. Once a week, Disp: , Rfl:     Zinc 50 MG tablet, , Disp: , Rfl:     megestrol (MEGACE) 40 MG/ML suspension, Take 20 mL by mouth Daily., Disp: 600 mL, Rfl: 3    Allergies:   Allergies   Allergen Reactions    Ceclor [Cefaclor]     Cephalosporins Unknown - High Severity    Codeine     Diltiazem Swelling    Olodaterol Unknown - High Severity    Penicillins     Statins     Tiotropium Unknown - High Severity         Physical Exam:  Vital Signs:   Vitals:    12/22/23 1148   BP: 130/80   BP Location: Left arm   Patient Position: Sitting   Cuff Size: Adult   Pulse: 85   Resp: 16   SpO2: 93%  Comment: 2 lt   Weight: 38.6 kg (85 lb)   Height: 149.9 cm (59.02\") "     Body mass index is 17.16 kg/m².     Physical Exam  Vitals and nursing note reviewed.   Constitutional:       Appearance: Normal appearance. She is ill-appearing.   HENT:      Head: Normocephalic and atraumatic.      Right Ear: Tympanic membrane, ear canal and external ear normal.      Left Ear: Tympanic membrane, ear canal and external ear normal.      Nose: Nose normal.      Mouth/Throat:      Mouth: Mucous membranes are dry.      Pharynx: Oropharynx is clear.   Eyes:      Extraocular Movements: Extraocular movements intact.      Conjunctiva/sclera: Conjunctivae normal.      Pupils: Pupils are equal, round, and reactive to light.   Cardiovascular:      Rate and Rhythm: Normal rate and regular rhythm.      Pulses: Normal pulses.      Heart sounds: Normal heart sounds.   Pulmonary:      Effort: Pulmonary effort is normal.   Musculoskeletal:      Cervical back: Normal range of motion and neck supple.   Feet:      Comments:      Neurological:      Mental Status: She is alert.         Procedures      Assessment/Plan:   Diagnoses and all orders for this visit:    1. Anxiety (Primary)  -     Comprehensive Metabolic Panel; Future  -     Hemoglobin A1c; Future  -     Lipid Panel; Future  -     CBC & Differential; Future  -     Vitamin B12; Future  -     TSH Rfx On Abnormal To Free T4; Future  -     Comprehensive Metabolic Panel  -     Hemoglobin A1c  -     Lipid Panel  -     CBC & Differential  -     Vitamin B12  -     TSH Rfx On Abnormal To Free T4  -     ALPRAZolam (XANAX) 0.25 MG tablet; Take 1 tablet by mouth 4 (Four) Times a Day As Needed for Anxiety.  Dispense: 120 tablet; Refill: 2    2. Adjustment disorder with disturbance of emotion  Assessment & Plan:  Refill patient's Xanax.  We had a long discussion about her living by herself.  Make sure house is safe.    Orders:  -     Comprehensive Metabolic Panel; Future  -     Hemoglobin A1c; Future  -     Lipid Panel; Future  -     CBC & Differential; Future  -      Vitamin B12; Future  -     TSH Rfx On Abnormal To Free T4; Future  -     Comprehensive Metabolic Panel  -     Hemoglobin A1c  -     Lipid Panel  -     CBC & Differential  -     Vitamin B12  -     TSH Rfx On Abnormal To Free T4    3. Centrilobular emphysema  Assessment & Plan:  Patient is oxygen dependent.  We talked about getting her immunizations.  She is only getting 1 at a time.    Orders:  -     Comprehensive Metabolic Panel; Future  -     Hemoglobin A1c; Future  -     Lipid Panel; Future  -     CBC & Differential; Future  -     Vitamin B12; Future  -     TSH Rfx On Abnormal To Free T4; Future  -     Comprehensive Metabolic Panel  -     Hemoglobin A1c  -     Lipid Panel  -     CBC & Differential  -     Vitamin B12  -     TSH Rfx On Abnormal To Free T4    4. Coronary artery disease involving native heart, unspecified vessel or lesion type, unspecified whether angina present  Assessment & Plan:  Risk factor modification.  She just had a negative Myoview.    Orders:  -     Comprehensive Metabolic Panel; Future  -     Hemoglobin A1c; Future  -     Lipid Panel; Future  -     CBC & Differential; Future  -     Vitamin B12; Future  -     TSH Rfx On Abnormal To Free T4; Future  -     Comprehensive Metabolic Panel  -     Hemoglobin A1c  -     Lipid Panel  -     CBC & Differential  -     Vitamin B12  -     TSH Rfx On Abnormal To Free T4    5. Low vitamin B12 level  Assessment & Plan:  Blood work    Orders:  -     Comprehensive Metabolic Panel; Future  -     Hemoglobin A1c; Future  -     Lipid Panel; Future  -     CBC & Differential; Future  -     Vitamin B12; Future  -     TSH Rfx On Abnormal To Free T4; Future  -     Comprehensive Metabolic Panel  -     Hemoglobin A1c  -     Lipid Panel  -     CBC & Differential  -     Vitamin B12  -     TSH Rfx On Abnormal To Free T4    6. Dyslipidemia  Assessment & Plan:  Blood work.    Orders:  -     Comprehensive Metabolic Panel; Future  -     Hemoglobin A1c; Future  -     Lipid  Panel; Future  -     CBC & Differential; Future  -     Vitamin B12; Future  -     TSH Rfx On Abnormal To Free T4; Future  -     Comprehensive Metabolic Panel  -     Hemoglobin A1c  -     Lipid Panel  -     CBC & Differential  -     Vitamin B12  -     TSH Rfx On Abnormal To Free T4    7. Primary hypertension  Assessment & Plan:  Discussed with patient to monitor their blood pressure and if systolic blood pressure goes above 140 or diastolic is above 90 to return to clinic.  Take medicines as directed, call for any problems, patient not having or any worrisome symptoms.        Orders:  -     Comprehensive Metabolic Panel; Future  -     Hemoglobin A1c; Future  -     Lipid Panel; Future  -     CBC & Differential; Future  -     Vitamin B12; Future  -     TSH Rfx On Abnormal To Free T4; Future  -     Comprehensive Metabolic Panel  -     Hemoglobin A1c  -     Lipid Panel  -     CBC & Differential  -     Vitamin B12  -     TSH Rfx On Abnormal To Free T4    8. Mixed hyperlipidemia  -     Comprehensive Metabolic Panel; Future  -     Hemoglobin A1c; Future  -     Lipid Panel; Future  -     CBC & Differential; Future  -     Vitamin B12; Future  -     TSH Rfx On Abnormal To Free T4; Future  -     Comprehensive Metabolic Panel  -     Hemoglobin A1c  -     Lipid Panel  -     CBC & Differential  -     Vitamin B12  -     TSH Rfx On Abnormal To Free T4    9. Loss of weight  Assessment & Plan:  Patient weighs 85 pounds.  I will start her on some Megace.  Recheck in 3 months.      10. Pulmonary emphysema, unspecified emphysema type  Assessment & Plan:  Patient is oxygen dependent.  We talked about getting her immunizations.  She is only getting 1 at a time.    Orders:  -     ALPRAZolam (XANAX) 0.25 MG tablet; Take 1 tablet by mouth 4 (Four) Times a Day As Needed for Anxiety.  Dispense: 120 tablet; Refill: 2    Other orders  -     megestrol (MEGACE) 40 MG/ML suspension; Take 20 mL by mouth Daily.  Dispense: 600 mL; Refill: 3              Follow Up:   Return in about 3 months (around 3/22/2024) for Video visit, Annual physical.    Wero Daley MD  [facility]     Answers submitted by the patient for this visit:  Other (Submitted on 12/15/2023)  Please describe your symptoms.: This is a follow up  visit  Have you had these symptoms before?: Yes  How long have you been having these symptoms?: Greater than 2 weeks  Primary Reason for Visit (Submitted on 12/15/2023)  What is the primary reason for your visit?: Other

## 2023-12-23 LAB
ALBUMIN SERPL-MCNC: 4.6 G/DL (ref 3.8–4.8)
ALBUMIN/GLOB SERPL: 2.2 {RATIO} (ref 1.2–2.2)
ALP SERPL-CCNC: 88 IU/L (ref 44–121)
ALT SERPL-CCNC: 11 IU/L (ref 0–32)
AST SERPL-CCNC: 15 IU/L (ref 0–40)
BASOPHILS # BLD AUTO: 0 X10E3/UL (ref 0–0.2)
BASOPHILS NFR BLD AUTO: 0 %
BILIRUB SERPL-MCNC: 0.5 MG/DL (ref 0–1.2)
BUN SERPL-MCNC: 17 MG/DL (ref 8–27)
BUN/CREAT SERPL: 25 (ref 12–28)
CALCIUM SERPL-MCNC: 9.9 MG/DL (ref 8.7–10.3)
CHLORIDE SERPL-SCNC: 96 MMOL/L (ref 96–106)
CHOLEST SERPL-MCNC: 183 MG/DL (ref 100–199)
CO2 SERPL-SCNC: 30 MMOL/L (ref 20–29)
CREAT SERPL-MCNC: 0.67 MG/DL (ref 0.57–1)
EGFRCR SERPLBLD CKD-EPI 2021: 91 ML/MIN/1.73
EOSINOPHIL # BLD AUTO: 0.1 X10E3/UL (ref 0–0.4)
EOSINOPHIL NFR BLD AUTO: 2 %
ERYTHROCYTE [DISTWIDTH] IN BLOOD BY AUTOMATED COUNT: 11.8 % (ref 11.7–15.4)
GLOBULIN SER CALC-MCNC: 2.1 G/DL (ref 1.5–4.5)
GLUCOSE SERPL-MCNC: 104 MG/DL (ref 70–99)
HBA1C MFR BLD: 5.6 % (ref 4.8–5.6)
HCT VFR BLD AUTO: 42.8 % (ref 34–46.6)
HDLC SERPL-MCNC: 68 MG/DL
HGB BLD-MCNC: 14.1 G/DL (ref 11.1–15.9)
IMM GRANULOCYTES # BLD AUTO: 0 X10E3/UL (ref 0–0.1)
IMM GRANULOCYTES NFR BLD AUTO: 0 %
LDLC SERPL CALC-MCNC: 99 MG/DL (ref 0–99)
LYMPHOCYTES # BLD AUTO: 1.1 X10E3/UL (ref 0.7–3.1)
LYMPHOCYTES NFR BLD AUTO: 18 %
MCH RBC QN AUTO: 30.7 PG (ref 26.6–33)
MCHC RBC AUTO-ENTMCNC: 32.9 G/DL (ref 31.5–35.7)
MCV RBC AUTO: 93 FL (ref 79–97)
MONOCYTES # BLD AUTO: 0.4 X10E3/UL (ref 0.1–0.9)
MONOCYTES NFR BLD AUTO: 7 %
NEUTROPHILS # BLD AUTO: 4.3 X10E3/UL (ref 1.4–7)
NEUTROPHILS NFR BLD AUTO: 73 %
PLATELET # BLD AUTO: 233 X10E3/UL (ref 150–450)
POTASSIUM SERPL-SCNC: 4.4 MMOL/L (ref 3.5–5.2)
PROT SERPL-MCNC: 6.7 G/DL (ref 6–8.5)
RBC # BLD AUTO: 4.59 X10E6/UL (ref 3.77–5.28)
SODIUM SERPL-SCNC: 140 MMOL/L (ref 134–144)
TRIGL SERPL-MCNC: 87 MG/DL (ref 0–149)
TSH SERPL DL<=0.005 MIU/L-ACNC: 0.58 UIU/ML (ref 0.45–4.5)
VIT B12 SERPL-MCNC: 819 PG/ML (ref 232–1245)
VLDLC SERPL CALC-MCNC: 16 MG/DL (ref 5–40)
WBC # BLD AUTO: 5.9 X10E3/UL (ref 3.4–10.8)

## 2023-12-26 ENCOUNTER — TELEPHONE (OUTPATIENT)
Dept: FAMILY MEDICINE CLINIC | Facility: CLINIC | Age: 73
End: 2023-12-26
Payer: MEDICARE

## 2023-12-26 ENCOUNTER — TELEPHONE (OUTPATIENT)
Dept: FAMILY MEDICINE CLINIC | Facility: CLINIC | Age: 73
End: 2023-12-26

## 2023-12-26 NOTE — TELEPHONE ENCOUNTER
Caller: Precious Parmar    Relationship: Self    Best call back number: [phone]     What is the best time to reach you: ANYTIME    Who are you requesting to speak with (clinical staff, provider,  specific staff member): CLINICAL OR PROVIDER    Do you know the name of the person who called: NA    What was the call regarding: PATIENT IS WONDERING WHEN YOU NEED TO SEE HER AGAIN. SHE DIDN'T SET UP A FOLLOW UP APPOINTMENT AT CHECK OUT.     Is it okay if the provider responds through MyChart: NO

## 2023-12-26 NOTE — TELEPHONE ENCOUNTER
PATIENT HAS CALLED REQUESTING A CALL BACK FROM NURSE IN REGARDS TO A MEDICATION megestrol (MEGACE) 40 MG/ML suspension     CALL BACK NUMBER [phone]

## 2023-12-27 NOTE — TELEPHONE ENCOUNTER
Pt does not want to take Megace. Told her if she can get up to 90 lbs in 1 month I won't make her take it.

## 2024-01-08 ENCOUNTER — OFFICE VISIT (OUTPATIENT)
Dept: CARDIOLOGY | Facility: CLINIC | Age: 74
End: 2024-01-08
Payer: MEDICARE

## 2024-01-08 VITALS
HEART RATE: 102 BPM | OXYGEN SATURATION: 94 % | HEIGHT: 59 IN | DIASTOLIC BLOOD PRESSURE: 79 MMHG | SYSTOLIC BLOOD PRESSURE: 132 MMHG | BODY MASS INDEX: 17.94 KG/M2 | WEIGHT: 89 LBS | RESPIRATION RATE: 18 BRPM

## 2024-01-08 DIAGNOSIS — E78.2 MIXED HYPERLIPIDEMIA: ICD-10-CM

## 2024-01-08 DIAGNOSIS — I10 PRIMARY HYPERTENSION: ICD-10-CM

## 2024-01-08 DIAGNOSIS — I25.112 CORONARY ARTERY DISEASE INVOLVING NATIVE CORONARY ARTERY OF NATIVE HEART WITH REFRACTORY ANGINA PECTORIS: Primary | ICD-10-CM

## 2024-01-08 NOTE — PROGRESS NOTES
[facility]  [facility]  [address] [place] [address]  Dept: [phone]  Dept Fax: [phone]    Date: 01/08/2024  Patient: Precious Parmar  YOB: 1950    Follow Up Office Visit Note    Interval Follow-up  Ms. Precious Parmar is a 73 y.o. female who is here for follow-up.    Subjective   Doing better.  No recurrence of severe chest pain, just some mild pressure when she pushes herself too hard.  Improvement in dyspnea as well.   Patient denies orthopnea, PND, palpitations, lightheadedness, syncope or medications side-effects.    The following portions of the patient's history were reviewed and updated as appropriate: allergies, current medications, past family history, past medical history, past social history, past surgical history, and problem list.    Medications:   Allergies   Allergen Reactions    Ceclor [Cefaclor]     Cephalosporins Unknown - High Severity    Codeine     Diltiazem Swelling    Olodaterol Unknown - High Severity    Penicillins     Statins     Tiotropium Unknown - High Severity      Current Outpatient Medications   Medication Instructions    albuterol sulfate  (90 Base) MCG/ACT inhaler 2 puffs, Inhalation, Every 4 Hours PRN, Lupin  brand please    ALPRAZolam (XANAX) 0.25 mg, Oral, 4 Times Daily PRN    aspirin 81 mg, Oral, Daily    Cholecalciferol 50 MCG (2000 UT) tablet 2 tablets, Oral, Daily    fish oil 2,000 mg, Oral, Daily    FOLIC ACID  mcg, Oral, Daily, OTC     ipratropium-albuterol (DUO-NEB) 0.5-2.5 mg/3 ml nebulizer 3 mL, Nebulization, Every 4 Hours    metoprolol succinate XL (TOPROL-XL) 25 mg, Oral, Daily    mirtazapine (REMERON) 15 mg, Oral, Every Night at Bedtime    nitroglycerin (NITROSTAT) 0.4 MG SL tablet 1 under the tongue as needed for angina, may repeat q5mins for up three doses    O2 (OXYGEN) 2 L/min.    ranolazine (RANEXA) 500 mg, Oral, 2 Times Daily    vitamin B-12 (CYANOCOBALAMIN) 1,000 mcg, Oral,  "Every Other Day, Once a week    Zinc 50 MG tablet No dose, route, or frequency recorded.       Tobacco Use: Medium Risk (1/8/2024)    Patient History     Smoking Tobacco Use: Former     Smokeless Tobacco Use: Never     Passive Exposure: Not on file        Objective  Vitals:    01/08/24 1452   BP: 132/79   BP Location: Right arm   Patient Position: Sitting   Cuff Size: Adult   Pulse: 102   Resp: 18   SpO2: 94%  Comment: 2L   Weight: 40.4 kg (89 lb)   Height: 149.9 cm (59\")   PainSc: 0-No pain      Vitals:    01/08/24 1452   BP: 132/79   BP Location: Right arm   Patient Position: Sitting   Cuff Size: Adult   Pulse: 102   Resp: 18   SpO2: 94%   Weight: 40.4 kg (89 lb)   Height: 149.9 cm (59\")          Physical Exam  Constitutional:       Appearance: Not in distress.   Neck:      Vascular: JVD normal.   Pulmonary:      Breath sounds: Decreased breath sounds present.   Cardiovascular:      Normal rate. Regular rhythm.      Murmurs: There is no murmur.   Pulses:     Intact distal pulses.   Edema:     Peripheral edema absent.   Neurological:      Mental Status: Alert.          Diagnostic Data  Lab Results   Component Value Date     12/22/2023    K 4.4 12/22/2023    CL 96 12/22/2023    CO2 30 (H) 12/22/2023    BUN 17 12/22/2023    CREATININE 0.67 12/22/2023    CALCIUM 9.9 12/22/2023    BILITOT 0.5 12/22/2023    ALKPHOS 88 12/22/2023    ALT 11 12/22/2023    AST 15 12/22/2023    GLUCOSE 104 (H) 12/22/2023    ALBUMIN 4.6 12/22/2023     Lab Results   Component Value Date    WBC 5.9 12/22/2023    HGB 14.1 12/22/2023    HCT 42.8 12/22/2023     12/22/2023     No results found for: \"APTT\", \"INR\", \"PTT\"  No results found for: \"CKTOTAL\", \"TROPONINI\", \"TROPONINT\", \"CKMBINDEX\", \"BNP\"  No results found for: \"BNP\", \"PROBNP\"    Lab Results   Component Value Date    CHLPL 183 12/22/2023    TRIG 87 12/22/2023    HDL 68 12/22/2023    LDL 99 12/22/2023     Lab Results   Component Value Date    TSH 0.577 12/22/2023    FREET4 1.36 " 12/09/2022       CV Diagnostics:  Procedures    ECHO/MUGA: Results for orders placed in visit on 12/20/23    Adult Transthoracic Echo Complete W/ Cont if Necessary Per Protocol    Interpretation Summary    Poor windows; incomplete study.    Normal LV size and function, ejection fraction estimated by 2D, 56 - 60%.    Mild to moderate mitral valve regurgitation.    Left ventricular diastolic function was not assessed.     STRESS TESTS: No results found for this or any previous visit.     CARDIAC CATH: No results found for this or any previous visit.     DEVICES: No valid procedures specified.   HOLTER: No results found for this or any previous visit.     CT/MRI:  No results found for this or any previous visit.    VASCULAR: No valid procedures specified.     Assessment and Plan  Diagnoses and all orders for this visit:    1. Coronary artery disease involving native coronary artery of native heart with refractory angina pectoris (Primary)  Assessment & Plan:  Coronary artery disease:  November 2006: [facility] with PTCA for septal  2.5 x 8 Cypher to ostium of LAD/RAMUS.  Normal LVEF.   April 2011: MPS per [facility]:  Normal perfusion, ejection fraction preserved.  9/14 echo wnl IDD  Coronary artery disease is worsening. CCS 3 angina, with chest pain walking few steps.  Stress test showing old myocardial infarction but no ischemia.  Echo very limited in view of poor windows, so unable to detect regional motion modalities there.  Severe emphysema complicating the presentation.  High risk for cardiac catheterization in view of weight loss/cachexia/poor appetite and severe emphysema.  CCS 1-2 angina with significant improvement  Continue metoprolol succinate 25 mg 1 tab daily, aspirin 81 mg p.o. daily and amlodipine 5 mg daily.  LDL to goal so deferring ezetimibe for now.  Refer patient for echo with contrast to assess for old infarction; if present, we will hold off cardiac catheterization in view of no significant benefit.   If absent, and setting of chest pressure with exertion, there may be some benefit undergoing a cardiac cath looking for severe ischemia, mimicking old infarction on stress test.    Orders:  -     Adult Transthoracic Echo Limited W/ Cont if Necessary Per Protocol; Future    2. Primary hypertension  Assessment & Plan:  Hypertension is improving with treatment.  Continue current treatment regimen.  Dietary sodium restriction.  Regular aerobic exercise.  Stop smoking.  Continue current medications.  Ambulatory blood pressure monitoring.  Blood pressure will be reassessed at the next regular appointment.      3. Mixed hyperlipidemia  Assessment & Plan:  Lipid abnormalities are newly identified.  Nutritional counseling was provided. and Lipid-lowering therapy was not prescribed due to cachexia, current functional status and advanced age.  Lipids will be reassessed in 6 months.  Low-dose statin might be consideration if weight gain and patient recovers her strength.           Return in about 3 months (around 4/8/2024).    There are no Patient Instructions on file for this visit.    Kenn Heard MD

## 2024-01-08 NOTE — ASSESSMENT & PLAN NOTE
Coronary artery disease:  November 2006: [facility] with PTCA for septal  2.5 x 8 Cypher to ostium of LAD/RAMUS.  Normal LVEF.   April 2011: MPS per [facility]:  Normal perfusion, ejection fraction preserved.  9/14 echo wnl IDD  Coronary artery disease is worsening. CCS 3 angina, with chest pain walking few steps.  Stress test showing old myocardial infarction but no ischemia.  Echo very limited in view of poor windows, so unable to detect regional motion modalities there.  Severe emphysema complicating the presentation.  High risk for cardiac catheterization in view of weight loss/cachexia/poor appetite and severe emphysema.  CCS 1-2 angina with significant improvement  Continue metoprolol succinate 25 mg 1 tab daily, aspirin 81 mg p.o. daily and amlodipine 5 mg daily.  LDL to goal so deferring ezetimibe for now.  Refer patient for echo with contrast to assess for old infarction; if present, we will hold off cardiac catheterization in view of no significant benefit.  If absent, and setting of chest pressure with exertion, there may be some benefit undergoing a cardiac cath looking for severe ischemia, mimicking old infarction on stress test.

## 2024-01-08 NOTE — ASSESSMENT & PLAN NOTE
Lipid abnormalities are newly identified.  Nutritional counseling was provided. and Lipid-lowering therapy was not prescribed due to cachexia, current functional status and advanced age.  Lipids will be reassessed in 6 months.  Low-dose statin might be consideration if weight gain and patient recovers her strength.

## 2024-01-09 ENCOUNTER — TELEPHONE (OUTPATIENT)
Dept: CARDIOLOGY | Facility: CLINIC | Age: 74
End: 2024-01-09
Payer: MEDICARE

## 2024-01-09 NOTE — TELEPHONE ENCOUNTER
Per patient phone call, and discussed with Dr. BALES Patient wants to receive 3 vaccines this week and wait 2 weeks before she does the ECHO.  Patient wants to call [facility] to reschedule Echo originally scheduled for 1/12/24 2:45l.

## 2024-01-18 ENCOUNTER — TELEPHONE (OUTPATIENT)
Dept: FAMILY MEDICINE CLINIC | Facility: CLINIC | Age: 74
End: 2024-01-18
Payer: MEDICARE

## 2024-01-18 RX ORDER — IPRATROPIUM BROMIDE AND ALBUTEROL SULFATE 2.5; .5 MG/3ML; MG/3ML
3 SOLUTION RESPIRATORY (INHALATION) EVERY 4 HOURS
Qty: 360 ML | Refills: 1 | Status: SHIPPED | OUTPATIENT
Start: 2024-01-18

## 2024-01-18 RX ORDER — IPRATROPIUM BROMIDE AND ALBUTEROL SULFATE 2.5; .5 MG/3ML; MG/3ML
3 SOLUTION RESPIRATORY (INHALATION) EVERY 4 HOURS
Qty: 360 ML | Refills: 1 | Status: SHIPPED | OUTPATIENT
Start: 2024-01-18 | End: 2024-01-18 | Stop reason: SDUPTHER

## 2024-01-18 NOTE — TELEPHONE ENCOUNTER
[facility].  Called patient left message on her machine.

## 2024-01-18 NOTE — TELEPHONE ENCOUNTER
Name: Agata Precious J      Relationship: Self      Best Callback Number: [phone]       HUB PROVIDED THE RELAY MESSAGE FROM THE OFFICE      PATIENT: VOICED UNDERSTANDING AND HAS NO FURTHER QUESTIONS AT THIS TIME    ADDITIONAL INFORMATION:

## 2024-01-22 ENCOUNTER — TELEPHONE (OUTPATIENT)
Dept: FAMILY MEDICINE CLINIC | Facility: CLINIC | Age: 74
End: 2024-01-22
Payer: MEDICARE

## 2024-01-22 DIAGNOSIS — F41.9 ANXIETY: ICD-10-CM

## 2024-01-22 DIAGNOSIS — J43.9 PULMONARY EMPHYSEMA, UNSPECIFIED EMPHYSEMA TYPE: ICD-10-CM

## 2024-01-22 RX ORDER — ALPRAZOLAM 0.25 MG/1
0.25 TABLET ORAL 3 TIMES DAILY PRN
Qty: 90 TABLET | Refills: 2 | Status: SHIPPED | OUTPATIENT
Start: 2024-01-22

## 2024-01-22 NOTE — TELEPHONE ENCOUNTER
----- Message from Jazmyne Abbasi MA sent at 1/22/2024  2:33 PM EST -----  Regarding: FW: Alprazolam  Contact: [phone]    ----- Message -----  From: Precious Parmar  Sent: 1/22/2024   1:56 PM EST  To: [facility]  Subject: Alprazolam                                       I have changed my [facility]. It has been scanned in. The new one only allows 90 pills per 30 days. I think it is safe to say I can go back to 3 times a day now. I guess you will need to give [facility] a new prescription. Do we need to  the paper prescription & hand deliver it or can you do it electronically. It was for 3 times a day previously. I am doing much better mood wise & have not had to use it 4 times a day regularly. I think I am doing pretty good considering I am only 6 months down the road from losing my  of 60 years. I will see you on Jan 31st.  With kindest regards,  Precious Parmar

## 2024-01-29 DIAGNOSIS — R09.02 HYPOXIA: Primary | ICD-10-CM

## 2024-01-29 DIAGNOSIS — R09.02 HYPOXIA: ICD-10-CM

## 2024-01-29 RX ORDER — IPRATROPIUM BROMIDE AND ALBUTEROL SULFATE 2.5; .5 MG/3ML; MG/3ML
3 SOLUTION RESPIRATORY (INHALATION) EVERY 4 HOURS
Qty: 360 ML | Refills: 1 | Status: SHIPPED | OUTPATIENT
Start: 2024-01-29

## 2024-01-29 RX ORDER — IPRATROPIUM BROMIDE AND ALBUTEROL SULFATE 2.5; .5 MG/3ML; MG/3ML
3 SOLUTION RESPIRATORY (INHALATION) EVERY 4 HOURS
Qty: 360 ML | Refills: 1 | Status: SHIPPED | OUTPATIENT
Start: 2024-01-29 | End: 2024-01-29 | Stop reason: SDUPTHER

## 2024-01-31 ENCOUNTER — OFFICE VISIT (OUTPATIENT)
Dept: FAMILY MEDICINE CLINIC | Facility: CLINIC | Age: 74
End: 2024-01-31
Payer: MEDICARE

## 2024-01-31 VITALS
WEIGHT: 85.9 LBS | RESPIRATION RATE: 16 BRPM | OXYGEN SATURATION: 97 % | DIASTOLIC BLOOD PRESSURE: 68 MMHG | HEART RATE: 87 BPM | BODY MASS INDEX: 17.32 KG/M2 | SYSTOLIC BLOOD PRESSURE: 100 MMHG | HEIGHT: 59 IN

## 2024-01-31 DIAGNOSIS — R07.9 CHEST PAIN, UNSPECIFIED TYPE: ICD-10-CM

## 2024-01-31 DIAGNOSIS — R63.4 LOSS OF WEIGHT: ICD-10-CM

## 2024-01-31 DIAGNOSIS — F41.9 ANXIETY: ICD-10-CM

## 2024-01-31 DIAGNOSIS — J43.2 CENTRILOBULAR EMPHYSEMA: Primary | ICD-10-CM

## 2024-01-31 PROCEDURE — 99214 OFFICE O/P EST MOD 30 MIN: CPT | Performed by: FAMILY MEDICINE

## 2024-01-31 PROCEDURE — 3074F SYST BP LT 130 MM HG: CPT | Performed by: FAMILY MEDICINE

## 2024-01-31 PROCEDURE — 3078F DIAST BP <80 MM HG: CPT | Performed by: FAMILY MEDICINE

## 2024-01-31 NOTE — PROGRESS NOTES
Patient Name: Precious Parmar  : 1950   MRN: [id number]     Chief Complaint:    Chief Complaint   Patient presents with    Follow-up       History of Present Illness: Precious Parmar is a 73 y.o. female who is here today for follow up on breathing and weight loss  HPI        Review of Systems:   Review of Systems   Constitutional: Negative.  Negative for fever.   HENT: Negative.  Negative for congestion and sore throat.    Eyes: Negative.    Respiratory:  Positive for shortness of breath. Negative for wheezing.    Cardiovascular: Negative.  Negative for chest pain and leg swelling.   Gastrointestinal: Negative.  Negative for vomiting.   Neurological: Negative.  Negative for headaches.        Past Medical History:   Past Medical History:   Diagnosis Date    Age-related osteoporosis without current pathological fracture     Allergic rhinitis     Anxiety     AR (allergic rhinitis)     Chronic constipation     Chronic constipation     Chronic granulomatous disease     CKD (chronic kidney disease), stage III     COLD (chronic obstructive lung disease)     COPD (chronic obstructive pulmonary disease)     Coronary arteriosclerosis in native artery     Coronary artery disease     Diverticular disease of colon     Diverticulosis     SEVERE    Dyslipidemia     Emphysema lung     Encounter for immunization 2018    Endometriosis     High risk medication use     History of chest x-ray 2014    chronic change, no active disease     History of chest x-ray 2014    no acute change; findings consistent with COPD     History of chest x-ray 2014    no evidence of acute cardiopulmonary disease     History of echocardiogram 2014    EF 50-55%; E to A reversal in MV flow pattern suggestive of diastolic dysfunction    History of PFTs 2015    FVC 2.29 L 89%, FEV1 0.67 L, 34%, ratio 29%, after Xopenex HFA FEV1 improves to 0.81 L or 42%    History of PFTs 06/15/2015    data is acceptable and  reproducible; pt given 4 puffs of albuterol; pt gave good effort.     History of PFTs 11/20/2014    data is acceptable and reproducible; pt given 3 puffs of xopenex; pt gave good effort     Hyperlipidemia     MIXED    Hypertension     Hypothyroidism     Hypoxemia requiring supplemental oxygen     Kidney stone     Muscle pain     Myalgia     ARTHRALGIA    Nephrolithiasis     Non-toxic multinodular goiter 03/2017    NORMAL THYROID FUNCTION    Osteoporosis     Panacinar emphysema     Renal mass     L/LOWER    Senile osteoporosis     Statin intolerance     Tobacco use disorder     QUIT 2014    Vitamin B12 deficiency     Vitamin D deficiency        Past Surgical History:   Past Surgical History:   Procedure Laterality Date    CARDIAC CATHETERIZATION      CORONARY ANGIOPLASTY WITH STENT PLACEMENT      Status post diagnostic left heart catheterization, selective coronary angiography, left ventriculography, and stenting on 11/29/06, performed by Dr. Emir Lindquist.B.    HYSTERECTOMY      Age 20    OOPHORECTOMY      Age 20    ROTATOR CUFF REPAIR  2000    TONSILLECTOMY         Family History:   Family History   Problem Relation Age of Onset    Heart attack Mother     Stroke Mother     Hypertension Mother     Arthritis Mother     Heart disease Mother     Kidney disease Mother     Arthritis Father     Cancer Brother     Hypertension Brother     Hypertension Brother     Breast cancer Paternal Aunt         age unknown     Cancer Other     Ovarian cancer Neg Hx        Social History:   Social History     Socioeconomic History    Marital status:    Tobacco Use    Smoking status: Former     Packs/day: 1.00     Years: 46.00     Additional pack years: 0.00     Total pack years: 46.00     Types: Cigarettes     Start date: 1968     Quit date: 2/2/2014     Years since quitting: 10.0    Smokeless tobacco: Never   Vaping Use    Vaping Use: Never used    Passive vaping exposure: Yes   Substance and Sexual Activity    Alcohol use: No     Drug use: No    Sexual activity: Never       Medications:     Current Outpatient Medications:     albuterol sulfate  (90 Base) MCG/ACT inhaler, Inhale 2 puffs Every 4 (Four) Hours As Needed for Wheezing. Lupin  brand please, Disp: 18 g, Rfl: 10    ALPRAZolam (XANAX) 0.25 MG tablet, Take 1 tablet by mouth 3 (Three) Times a Day As Needed for Anxiety., Disp: 90 tablet, Rfl: 2    aspirin 81 MG EC tablet, Take 1 tablet by mouth Daily., Disp: 90 tablet, Rfl: 3    Cholecalciferol 50 MCG (2000 UT) tablet, Take 2 tablets by mouth Daily., Disp: , Rfl:     FOLIC ACID PO, Take 800 mcg by mouth Daily. OTC, Disp: , Rfl:     ipratropium-albuterol (DUO-NEB) 0.5-2.5 mg/3 ml nebulizer, Take 3 mL by nebulization Every 4 (Four) Hours., Disp: 360 mL, Rfl: 1    metoprolol succinate XL (TOPROL-XL) 25 MG 24 hr tablet, Take 1 tablet by mouth Daily., Disp: 90 tablet, Rfl: 3    mirtazapine (REMERON) 15 MG tablet, Take 1 tablet by mouth every night at bedtime., Disp: 90 tablet, Rfl: 1    nitroglycerin (NITROSTAT) 0.4 MG SL tablet, 1 under the tongue as needed for angina, may repeat q5mins for up three doses, Disp: 100 tablet, Rfl: 11    O2 (OXYGEN), 2 L/min., Disp: , Rfl:     Omega-3 Fatty Acids (FISH OIL) 1000 MG capsule capsule, Take 2 capsules by mouth Daily., Disp: , Rfl:     ranolazine (RANEXA) 500 MG 12 hr tablet, Take 1 tablet by mouth 2 (Two) Times a Day., Disp: 30 tablet, Rfl: 11    vitamin B-12 (CYANOCOBALAMIN) 1000 MCG tablet, Take 1 tablet by mouth Every Other Day. Once a week, Disp: , Rfl:     Zinc 50 MG tablet, , Disp: , Rfl:     Allergies:   Allergies   Allergen Reactions    Ceclor [Cefaclor]     Cephalosporins Unknown - High Severity    Codeine     Diltiazem Swelling    Olodaterol Unknown - High Severity    Penicillins     Statins     Tiotropium Unknown - High Severity         Physical Exam:  Vital Signs:   Vitals:    01/31/24 1132   BP: 100/68   BP Location: Left arm   Patient Position: Sitting   Cuff Size: Adult  "  Pulse: 87   Resp: 16   SpO2: 97%   Weight: 39 kg (85 lb 14.4 oz)   Height: 149.9 cm (59.02\")   PainSc: 0-No pain     Body mass index is 17.34 kg/m².     Physical Exam  Vitals and nursing note reviewed.   Constitutional:       Appearance: Normal appearance. She is ill-appearing.   HENT:      Head: Normocephalic and atraumatic.      Right Ear: Tympanic membrane, ear canal and external ear normal.      Left Ear: Tympanic membrane, ear canal and external ear normal.      Nose: Nose normal.      Mouth/Throat:      Mouth: Mucous membranes are dry.      Pharynx: Oropharynx is clear.   Eyes:      Extraocular Movements: Extraocular movements intact.      Conjunctiva/sclera: Conjunctivae normal.      Pupils: Pupils are equal, round, and reactive to light.   Cardiovascular:      Rate and Rhythm: Normal rate and regular rhythm.      Pulses: Normal pulses.      Heart sounds: Normal heart sounds.   Pulmonary:      Effort: Pulmonary effort is normal.      Breath sounds: Normal breath sounds.   Musculoskeletal:      Cervical back: Normal range of motion and neck supple.   Feet:      Comments:      Neurological:      Mental Status: She is alert.         Procedures      Assessment/Plan:   Diagnoses and all orders for this visit:    1. Centrilobular emphysema (Primary)  Assessment & Plan:  Patient is oxygen dependent.  She is going to get her COVID shot Friday.  I talked her about the importance of getting all her shots.      2. Loss of weight  Assessment & Plan:  Patient does not want me to to talk about her weight in March she does not want to take Megace.  I will do my best not talk about it.    .  She will do her best to try and eat.      3. Chest pain, unspecified type  Assessment & Plan:  Has been having some chest pain.  He is currently seeing a cardiologist having this worked up.  Total if he gets worse go emergency room      4. Anxiety  Assessment & Plan:  To clinic in 2 months refill her Xanax.               Follow Up: "   Return in about 2 months (around 3/31/2024) for Video visit.      Wero Daley MD  [facility]     Answers submitted by the patient for this visit:  Primary Reason for Visit (Submitted on 1/24/2024)  What is the primary reason for your visit?: Shortness of Breath  Shortness of Breath Questionnaire (Submitted on 1/24/2024)  Chief Complaint: Shortness of breath  chest congestion: No  dry cough: No

## 2024-01-31 NOTE — ASSESSMENT & PLAN NOTE
Patient is oxygen dependent.  She is going to get her COVID shot Friday.  I talked her about the importance of getting all her shots.

## 2024-01-31 NOTE — ASSESSMENT & PLAN NOTE
Airway  Date/Time: 11/25/2022 7:15 AM  Urgency: elective    Airway not difficult    General Information and Staff    Patient location during procedure: OR  Anesthesiologist: Stone Jones MD  Performed: anesthesiologist     Indications and Patient Condition  Indications for airway management: anesthesia  Spontaneous Ventilation: absent  Sedation level: deep  Preoxygenated: yes  Patient position: sniffing  Mask difficulty assessment: 0 - not attempted    Final Airway Details  Final airway type: supraglottic airway      Successful airway: classic  Size 4       Number of attempts at approach: 1 Patient does not want me to to talk about her weight in March she does not want to take Megace.  I will do my best not talk about it.    .  She will do her best to try and eat.

## 2024-01-31 NOTE — ASSESSMENT & PLAN NOTE
This patient has an order in for a MRI MRCP without contrast. The prior US imaging recommendation was MRI Liver with and without contrast.  Please advise if you would like us to modify this order.  Any questions, please call the [facility] at [phone]. [phone].  Thanks!       To clinic in 2 months refill her Xanax.

## 2024-01-31 NOTE — ASSESSMENT & PLAN NOTE
Has been having some chest pain.  He is currently seeing a cardiologist having this worked up.  Total if he gets worse go emergency room

## 2024-02-02 ENCOUNTER — TELEPHONE (OUTPATIENT)
Dept: CARDIOLOGY | Facility: CLINIC | Age: 74
End: 2024-02-02
Payer: MEDICARE

## 2024-02-02 DIAGNOSIS — I25.112 CORONARY ARTERY DISEASE INVOLVING NATIVE CORONARY ARTERY OF NATIVE HEART WITH REFRACTORY ANGINA PECTORIS: Primary | ICD-10-CM

## 2024-02-02 DIAGNOSIS — I25.10 CORONARY ARTERY DISEASE INVOLVING NATIVE CORONARY ARTERY OF NATIVE HEART WITHOUT ANGINA PECTORIS: ICD-10-CM

## 2024-02-02 NOTE — TELEPHONE ENCOUNTER
Caller: Precious Kelly    Relationship: Self    Best call back number: [phone]     What is the best time to reach you: ANYTIME    Who are you requesting to speak with (clinical staff, provider,  specific staff member): CLINICAL STAFF    Do you know the name of the person who called: PRECIOUS KELLY    What was the call regarding: PATIENT RECEIVED FLU AND RSV VACCINATIONS. SHE CALLED TO SET UP THE COVID VACCINE, BUT SHE WAS TOLD THAT SHE IS UP-TO-DATE AS SHE HAS HAD 2 MODERNA + 2 BOOSTERS. THE PATIENT WOULD LIKE TO ENSURE THAT SHE IS GOOD TO GO AHEAD AND SCHEDULE THE ECHO.

## 2024-02-02 NOTE — TELEPHONE ENCOUNTER
Spoke with PT and let her know that the order is in for her ECHO, and scheduling will be reaching out to her. PT verbalized understanding.

## 2024-02-05 ENCOUNTER — TELEPHONE (OUTPATIENT)
Dept: CARDIOLOGY | Facility: CLINIC | Age: 74
End: 2024-02-05
Payer: MEDICARE

## 2024-02-05 NOTE — TELEPHONE ENCOUNTER
ECHO scheduled at [facility] 2/12/24 1:15, 1:00 arrival. Patient contacted.

## 2024-02-08 ENCOUNTER — TELEPHONE (OUTPATIENT)
Dept: FAMILY MEDICINE CLINIC | Facility: CLINIC | Age: 74
End: 2024-02-08
Payer: MEDICARE

## 2024-02-08 DIAGNOSIS — R09.02 HYPOXIA: ICD-10-CM

## 2024-02-08 RX ORDER — IPRATROPIUM BROMIDE AND ALBUTEROL SULFATE 2.5; .5 MG/3ML; MG/3ML
3 SOLUTION RESPIRATORY (INHALATION) EVERY 4 HOURS
Qty: 360 ML | Refills: 1 | Status: SHIPPED | OUTPATIENT
Start: 2024-02-08

## 2024-02-12 ENCOUNTER — OUTSIDE FACILITY SERVICE (OUTPATIENT)
Dept: CARDIOLOGY | Facility: CLINIC | Age: 74
End: 2024-02-12
Payer: MEDICARE

## 2024-02-12 PROCEDURE — 93306 TTE W/DOPPLER COMPLETE: CPT | Performed by: INTERNAL MEDICINE

## 2024-02-14 ENCOUNTER — TELEPHONE (OUTPATIENT)
Dept: CARDIOLOGY | Facility: CLINIC | Age: 74
End: 2024-02-14
Payer: MEDICARE

## 2024-03-27 ENCOUNTER — TELEMEDICINE (OUTPATIENT)
Dept: FAMILY MEDICINE CLINIC | Facility: CLINIC | Age: 74
End: 2024-03-27
Payer: MEDICARE

## 2024-03-27 VITALS
DIASTOLIC BLOOD PRESSURE: 60 MMHG | HEIGHT: 59 IN | OXYGEN SATURATION: 98 % | WEIGHT: 88 LBS | HEART RATE: 81 BPM | SYSTOLIC BLOOD PRESSURE: 106 MMHG | BODY MASS INDEX: 17.74 KG/M2 | RESPIRATION RATE: 16 BRPM

## 2024-03-27 DIAGNOSIS — E78.2 MIXED HYPERLIPIDEMIA: ICD-10-CM

## 2024-03-27 DIAGNOSIS — I10 PRIMARY HYPERTENSION: ICD-10-CM

## 2024-03-27 DIAGNOSIS — R79.89 LOW VITAMIN B12 LEVEL: ICD-10-CM

## 2024-03-27 DIAGNOSIS — E05.90 HYPERTHYROIDISM: Primary | ICD-10-CM

## 2024-03-27 DIAGNOSIS — J43.9 PULMONARY EMPHYSEMA, UNSPECIFIED EMPHYSEMA TYPE: ICD-10-CM

## 2024-03-27 DIAGNOSIS — E55.9 VITAMIN D DEFICIENCY: ICD-10-CM

## 2024-03-27 DIAGNOSIS — F41.9 ANXIETY: ICD-10-CM

## 2024-03-27 RX ORDER — ALPRAZOLAM 0.25 MG/1
0.25 TABLET ORAL 3 TIMES DAILY PRN
Qty: 90 TABLET | Refills: 2 | Status: SHIPPED | OUTPATIENT
Start: 2024-03-27

## 2024-03-27 NOTE — ASSESSMENT & PLAN NOTE
Patient is oxygen dependent.  She has not had any significant troubles as of late.  I discussed with her environmental measures to help her breathing.

## 2024-03-27 NOTE — PROGRESS NOTES
Telehealth E-Visit      Patient Name: Precious Parmar  : 1950   MRN: [id number]     Chief Complaint:    Chief Complaint   Patient presents with    Follow-up       I have reviewed the E-Visit questionnaire and the patient's answers, my assessment and plan are listed below.   You have chosen to receive care through a telehealth visit.  Do you consent to use a video/audio connection for your medical care today? Yes     History of Present Illness: Precious Parmar is a 73 y.o. female who is here today to follow up with wieight and anxiety      Subjective      Review of Systems:   Review of Systems   Constitutional: Negative.    HENT: Negative.     Eyes: Negative.    Respiratory: Negative.     Cardiovascular: Negative.    Gastrointestinal: Negative.    Neurological: Negative.        I have reviewed and the following portions of the patient's history were updated as appropriate: past family history, past medical history, past social history, past surgical history and problem list.    Medications:     Current Outpatient Medications:     albuterol sulfate  (90 Base) MCG/ACT inhaler, Inhale 2 puffs Every 4 (Four) Hours As Needed for Wheezing. Lupin  brand please, Disp: 18 g, Rfl: 10    ALPRAZolam (XANAX) 0.25 MG tablet, Take 1 tablet by mouth 3 (Three) Times a Day As Needed for Anxiety., Disp: 90 tablet, Rfl: 2    aspirin 81 MG EC tablet, Take 1 tablet by mouth Daily., Disp: 90 tablet, Rfl: 3    Cholecalciferol 50 MCG (2000 UT) tablet, Take 2 tablets by mouth Daily., Disp: , Rfl:     FOLIC ACID PO, Take 800 mcg by mouth Daily. OTC, Disp: , Rfl:     ipratropium-albuterol (DUO-NEB) 0.5-2.5 mg/3 ml nebulizer, Take 3 mL by nebulization Every 4 (Four) Hours., Disp: 360 mL, Rfl: 1    metoprolol succinate XL (TOPROL-XL) 25 MG 24 hr tablet, Take 1 tablet by mouth Daily., Disp: 90 tablet, Rfl: 3    mirtazapine (REMERON) 15 MG tablet, Take 1 tablet by mouth every night at bedtime., Disp: 90 tablet, Rfl: 1     "nitroglycerin (NITROSTAT) 0.4 MG SL tablet, 1 under the tongue as needed for angina, may repeat q5mins for up three doses, Disp: 100 tablet, Rfl: 11    O2 (OXYGEN), 2 L/min., Disp: , Rfl:     Omega-3 Fatty Acids (FISH OIL) 1000 MG capsule capsule, Take 2 capsules by mouth Daily., Disp: , Rfl:     ranolazine (RANEXA) 500 MG 12 hr tablet, Take 1 tablet by mouth 2 (Two) Times a Day., Disp: 30 tablet, Rfl: 11    vitamin B-12 (CYANOCOBALAMIN) 1000 MCG tablet, Take 1 tablet by mouth Every Other Day. Once a week, Disp: , Rfl:     Zinc 50 MG tablet, , Disp: , Rfl:     Allergies:   Allergies   Allergen Reactions    Ceclor [Cefaclor]     Cephalosporins Unknown - High Severity    Codeine     Diltiazem Swelling    Olodaterol Unknown - High Severity    Penicillins     Statins     Tiotropium Unknown - High Severity       Objective     Physical Exam:  Vital Signs:   Vitals:    03/27/24 1519   BP: 106/60   BP Location: Left arm   Patient Position: Sitting   Cuff Size: Adult   Pulse: 81   Resp: 16   SpO2: 98%   Weight: 39.9 kg (88 lb)   Height: 149.9 cm (59.02\")     Body mass index is 17.76 kg/m².    Physical Exam  Vitals and nursing note reviewed.   Constitutional:       Appearance: Normal appearance.   HENT:      Head: Normocephalic and atraumatic.      Right Ear: External ear normal.      Left Ear: External ear normal.      Nose: Nose normal.   Eyes:      Extraocular Movements: Extraocular movements intact.      Conjunctiva/sclera: Conjunctivae normal.   Pulmonary:      Effort: Pulmonary effort is normal.   Musculoskeletal:      Cervical back: Normal range of motion.   Feet:      Comments:      Neurological:      General: No focal deficit present.      Mental Status: She is alert and oriented to person, place, and time. Mental status is at baseline.   Psychiatric:         Mood and Affect: Mood normal.         Behavior: Behavior normal.         Thought Content: Thought content normal.         Judgment: Judgment normal. "         Assessment / Plan      Assessment/Plan:   Diagnoses and all orders for this visit:    1. Hyperthyroidism (Primary)  Assessment & Plan:  Work in 3 months      2. Anxiety  Assessment & Plan:  Refilled her Xanax.  House bill 1.  Return to clinic in 3 months.      3. Pulmonary emphysema, unspecified emphysema type  Assessment & Plan:  Patient is oxygen dependent.  She has not had any significant troubles as of late.  I discussed with her environmental measures to help her breathing.      4. Low vitamin B12 level  Assessment & Plan:  Blood work in 3 months      5. Vitamin D deficiency  Assessment & Plan:  Blood work in 3 months      6. Primary hypertension  Assessment & Plan:  Discussed with patient to monitor their blood pressure and if systolic blood pressure goes above 140 or diastolic is above 90 to return to clinic.  Take medicines as directed, call for any problems, patient not having or any worrisome symptoms.          7. Mixed hyperlipidemia  Assessment & Plan:  Blood work in 3 months           Follow Up:   No follow-ups on file.    Any medications prescribed have been sent electronically to   [address] [address]  Phone: [phone] Fax: [phone]    [place], [place] - [address] [place] [address] - [phone]  - [phone] [address] [address]  Phone: [phone] Fax: [phone]        Wero Daley MD  [facility]   03/27/24  15:35 EDT

## 2024-04-08 ENCOUNTER — OFFICE VISIT (OUTPATIENT)
Dept: CARDIOLOGY | Facility: CLINIC | Age: 74
End: 2024-04-08
Payer: MEDICARE

## 2024-04-08 VITALS
SYSTOLIC BLOOD PRESSURE: 122 MMHG | HEIGHT: 59 IN | BODY MASS INDEX: 17.58 KG/M2 | RESPIRATION RATE: 18 BRPM | WEIGHT: 87.2 LBS | OXYGEN SATURATION: 94 % | HEART RATE: 96 BPM | DIASTOLIC BLOOD PRESSURE: 72 MMHG

## 2024-04-08 DIAGNOSIS — E78.2 MIXED HYPERLIPIDEMIA: ICD-10-CM

## 2024-04-08 DIAGNOSIS — I10 PRIMARY HYPERTENSION: ICD-10-CM

## 2024-04-08 DIAGNOSIS — I25.112 CORONARY ARTERY DISEASE INVOLVING NATIVE CORONARY ARTERY OF NATIVE HEART WITH REFRACTORY ANGINA PECTORIS: Primary | ICD-10-CM

## 2024-04-08 PROCEDURE — 3074F SYST BP LT 130 MM HG: CPT | Performed by: INTERNAL MEDICINE

## 2024-04-08 PROCEDURE — 1160F RVW MEDS BY RX/DR IN RCRD: CPT | Performed by: INTERNAL MEDICINE

## 2024-04-08 PROCEDURE — 99214 OFFICE O/P EST MOD 30 MIN: CPT | Performed by: INTERNAL MEDICINE

## 2024-04-08 PROCEDURE — 3078F DIAST BP <80 MM HG: CPT | Performed by: INTERNAL MEDICINE

## 2024-04-08 PROCEDURE — 1159F MED LIST DOCD IN RCRD: CPT | Performed by: INTERNAL MEDICINE

## 2024-04-08 RX ORDER — METOPROLOL TARTRATE 1 MG/ML
5 INJECTION, SOLUTION INTRAVENOUS
OUTPATIENT
Start: 2024-04-08

## 2024-04-08 RX ORDER — METOPROLOL TARTRATE 50 MG/1
50 TABLET, FILM COATED ORAL
OUTPATIENT
Start: 2024-04-08

## 2024-04-08 RX ORDER — METOPROLOL TARTRATE 100 MG/1
100 TABLET ORAL ONCE
OUTPATIENT
Start: 2024-04-08

## 2024-04-08 RX ORDER — LIDOCAINE HYDROCHLORIDE 10 MG/ML
0.5 INJECTION, SOLUTION EPIDURAL; INFILTRATION; INTRACAUDAL; PERINEURAL ONCE AS NEEDED
OUTPATIENT
Start: 2024-04-08

## 2024-04-08 RX ORDER — SODIUM CHLORIDE 9 MG/ML
40 INJECTION, SOLUTION INTRAVENOUS AS NEEDED
OUTPATIENT
Start: 2024-04-08

## 2024-04-08 RX ORDER — METOPROLOL TARTRATE 100 MG/1
200 TABLET ORAL ONCE
OUTPATIENT
Start: 2024-04-08 | End: 2024-04-08

## 2024-04-08 RX ORDER — SODIUM CHLORIDE 0.9 % (FLUSH) 0.9 %
10 SYRINGE (ML) INJECTION EVERY 12 HOURS SCHEDULED
OUTPATIENT
Start: 2024-04-08

## 2024-04-08 RX ORDER — SODIUM CHLORIDE 0.9 % (FLUSH) 0.9 %
10 SYRINGE (ML) INJECTION AS NEEDED
OUTPATIENT
Start: 2024-04-08

## 2024-04-08 RX ORDER — NITROGLYCERIN 0.4 MG/1
0.4 TABLET SUBLINGUAL
OUTPATIENT
Start: 2024-04-08 | End: 2024-04-08

## 2024-04-08 RX ORDER — METOPROLOL TARTRATE 50 MG/1
50 TABLET, FILM COATED ORAL ONCE
OUTPATIENT
Start: 2024-04-08

## 2024-04-08 RX ORDER — NITROGLYCERIN 0.4 MG/1
0.8 TABLET SUBLINGUAL
OUTPATIENT
Start: 2024-04-08

## 2024-04-08 NOTE — ASSESSMENT & PLAN NOTE
Lipid abnormalities are improving with treatment    Plan:  Continue same medication/s without change.      Discussed medication dosage, use, side effects, and goals of treatment in detail.    Counseled patient on lifestyle modifications to help control hyperlipidemia.   Cholesterol lowering dietary information shared with patient.  Advised patient to exercise for 150 minutes weekly. (30 minute brisk walk, 5 days a week for example)  Weight Loss encouraged  Patient counseled in regards to heart healthy, low fat/ low cholesterol/low sat diet, daily exercise for 30 minutes, low to moderate intensity, and weight loss.  Lab Results   Component Value Date    LDL 99 12/22/2023    HDL 68 12/22/2023    CHLPL 183 12/22/2023    TRIG 87 12/22/2023      Patient Treatment Goals:   LDL goal is less than 70.  Consider ezetimibe 10 mg if severe CAD documented on CTA coronaries or cardiac cath.  Aggressive approach limited overall by poor functional status.    Followup in 6 months.

## 2024-04-08 NOTE — ASSESSMENT & PLAN NOTE
Coronary artery disease:  November 2006: [facility] with PTCA for septal  2.5 x 8 Cypher to ostium of LAD/RAMUS.  Normal LVEF.   April 2011: [facility] per [facility]:  Normal perfusion, ejection fraction preserved.  9/14 echo wnl IDD  Coronary artery disease is worsening. CCS 2-3 angina, with chest pain walking few steps.  Stress test showing old myocardial infarction but no ischemia.  Echo very limited in view of poor windows, so unable to detect regional motion modalities there, repeat with contrast showing no regional motion normalities, so likely related to severe ischemia.  Severe emphysema complicating the presentation.  Patient pacing herself to avoid anginal events.  High risk for cardiac catheterization in view of weight loss/cachexia/poor appetite and severe emphysema.  Continue metoprolol succinate 25 mg 1 tab daily, aspirin 81 mg p.o. daily and amlodipine 5 mg daily.  LDL to goal so deferring ezetimibe for now.  Discussed with patient extensively additional workup cardiac catheterization versus coronary CTA, reluctant to proceed with intervention and wants to discuss with her son.  Agreeable to proceed with noninvasive workup for now, and will call us back if changes her mind about invasive intervention.  Referred for coronary CTA.

## 2024-04-08 NOTE — PROGRESS NOTES
[facility] [place]  [facility] CARDIOLOGY  [address] [place] [address]  Dept: [phone]  Dept Fax: [phone]    Date: 04/08/2024  Patient: Precious Parmar  YOB: 1950    Follow Up Office Visit Note    Interval Follow-up  Ms. Precious Parmar is a 73 y.o. female who is here for follow-up on Coronary Artery Disease.    Subjective   Patient with significant limitation to exercise, secondary to shortness of breath likely related to her COPD on oxygen therapy but also chest pressure forcing patient to stop and pace herself.  Patient denies orthopnea, PND, palpitations, lightheadedness, syncope or medications side-effects.    The following portions of the patient's history were reviewed and updated as appropriate: allergies, current medications, past family history, past medical history, past social history, past surgical history, and problem list.    Medications:   Allergies   Allergen Reactions    Ceclor [Cefaclor]     Cephalosporins Unknown - High Severity    Codeine     Diltiazem Swelling    Olodaterol Unknown - High Severity    Penicillins     Statins     Tiotropium Unknown - High Severity      Current Outpatient Medications   Medication Instructions    albuterol sulfate  (90 Base) MCG/ACT inhaler 2 puffs, Inhalation, Every 4 Hours PRN, Lupin  brand please    ALPRAZolam (XANAX) 0.25 mg, Oral, 3 Times Daily PRN    aspirin 81 mg, Oral, Daily    Cholecalciferol 50 MCG (2000 UT) tablet 2 tablets, Oral, Daily    fish oil 2,000 mg, Oral, Daily    FOLIC ACID  mcg, Oral, Daily, OTC     ipratropium-albuterol (DUO-NEB) 0.5-2.5 mg/3 ml nebulizer 3 mL, Nebulization, Every 4 Hours    metoprolol succinate XL (TOPROL-XL) 25 mg, Oral, Daily    mirtazapine (REMERON) 15 mg, Oral, Every Night at Bedtime    nitroglycerin (NITROSTAT) 0.4 MG SL tablet 1 under the tongue as needed for angina, may repeat q5mins for up three doses    O2 (OXYGEN) 2 L/min.    ranolazine (RANEXA) 500  "mg, Oral, 2 Times Daily    vitamin B-12 (CYANOCOBALAMIN) 1,000 mcg, Oral, Weekly, Once a week    Zinc 50 MG tablet No dose, route, or frequency recorded.       Tobacco Use: Medium Risk (4/8/2024)    Patient History     Smoking Tobacco Use: Former     Smokeless Tobacco Use: Never     Passive Exposure: Not on file        Objective  Vitals:    04/08/24 1504   BP: 122/72   BP Location: Right arm   Patient Position: Sitting   Cuff Size: Adult   Pulse: 96   Resp: 18   SpO2: 94%  Comment: o2 2L   Weight: 39.6 kg (87 lb 3.2 oz)   Height: 149.9 cm (59\")   PainSc: 0-No pain      Vitals:    04/08/24 1504   BP: 122/72   BP Location: Right arm   Patient Position: Sitting   Cuff Size: Adult   Pulse: 96   Resp: 18   SpO2: 94%   Weight: 39.6 kg (87 lb 3.2 oz)   Height: 149.9 cm (59\")          Physical Exam  Constitutional:       Appearance: Not in distress.   Neck:      Vascular: JVD normal.   Pulmonary:      Breath sounds: Decreased air movement present. Decreased breath sounds present.   Cardiovascular:      Normal rate. Regular rhythm.      Murmurs: There is no murmur.   Pulses:     Intact distal pulses.   Edema:     Peripheral edema absent.   Neurological:      Mental Status: Alert.              Diagnostic Data  Lab Results   Component Value Date     12/22/2023    K 4.4 12/22/2023    CL 96 12/22/2023    CO2 30 (H) 12/22/2023    BUN 17 12/22/2023    CREATININE 0.67 12/22/2023    CALCIUM 9.9 12/22/2023    BILITOT 0.5 12/22/2023    ALKPHOS 88 12/22/2023    ALT 11 12/22/2023    AST 15 12/22/2023    GLUCOSE 104 (H) 12/22/2023    ALBUMIN 4.6 12/22/2023     Lab Results   Component Value Date    WBC 5.9 12/22/2023    HGB 14.1 12/22/2023    HCT 42.8 12/22/2023     12/22/2023     No results found for: \"APTT\", \"INR\", \"PTT\"  No results found for: \"CKTOTAL\", \"TROPONINI\", \"TROPONINT\", \"CKMBINDEX\", \"BNP\"  No results found for: \"BNP\", \"PROBNP\"    Lab Results   Component Value Date    CHLPL 183 12/22/2023    TRIG 87 12/22/2023    " HDL 68 12/22/2023    LDL 99 12/22/2023     Lab Results   Component Value Date    TSH 0.577 12/22/2023    FREET4 1.36 12/09/2022       CV Diagnostics:  Procedures    CXR: No results found for this or any previous visit.     ECHO/MUGA: Results for orders placed in visit on 12/20/23    Adult Transthoracic Echo Complete W/ Cont if Necessary Per Protocol    Interpretation Summary    Poor windows; incomplete study.    Normal LV size and function, ejection fraction estimated by 2D, 56 - 60%.    Mild to moderate mitral valve regurgitation.    Left ventricular diastolic function was not assessed.     STRESS TESTS: No results found for this or any previous visit.     CARDIAC CATH: No results found for this or any previous visit.     DEVICES: No valid procedures specified.   HOLTER: No results found for this or any previous visit.     CT/MRI:  No results found for this or any previous visit.    VASCULAR: No valid procedures specified.     Assessment and Plan  Diagnoses and all orders for this visit:    1. Coronary artery disease involving native coronary artery of native heart with refractory angina pectoris (Primary)  Assessment & Plan:  Coronary artery disease:  November 2006: C with PTCA for septal  2.5 x 8 Cypher to ostium of LAD/RAMUS.  Normal LVEF.   April 2011: MPS per [facility]:  Normal perfusion, ejection fraction preserved.  9/14 echo wnl IDD  Coronary artery disease is worsening. CCS 2-3 angina, with chest pain walking few steps.  Stress test showing old myocardial infarction but no ischemia.  Echo very limited in view of poor windows, so unable to detect regional motion modalities there, repeat with contrast showing no regional motion normalities, so likely related to severe ischemia.  Severe emphysema complicating the presentation.  Patient pacing herself to avoid anginal events.  High risk for cardiac catheterization in view of weight loss/cachexia/poor appetite and severe emphysema.  Continue metoprolol  succinate 25 mg 1 tab daily, aspirin 81 mg p.o. daily and amlodipine 5 mg daily.  LDL to goal so deferring ezetimibe for now.  Discussed with patient extensively additional workup cardiac catheterization versus coronary CTA, reluctant to proceed with intervention and wants to discuss with her son.  Agreeable to proceed with noninvasive workup for now, and will call us back if changes her mind about invasive intervention.  Referred for coronary CTA.    Orders:  -     Obtain Informed Consent - Computed Tomography Angiography of Chest - Angiogram of Coronary Arteries; Standing  -     Vital Signs Upon Arrival; Standing  -     Cardiac Monitoring; Standing  -     Notify CT After Administration of metoprolol tartrate (LOPRESSOR) tablet; Standing  -     No Caffeine or Nicotine 4 Hours Prior to CTA; Standing  -     Verify NPO Status - Patient to Be NPO at Least 4 Hours Prior to CTA; Standing  -     NPO Diet NPO Type: Strict NPO; Standing  -     Notify Provider If Total Metoprolol Given Equals 300mg & Heart Rate Not At Goal; Standing  -     Notify Provider Prior to Administration of Nitroglycerin if Patient SBP <80; Standing  -     Do Not Take Phosphodiasterase Inhibitors in the 72 Hours Prior to Coronary CTA  -     Insert Peripheral IV; Standing  -     Saline Lock & Maintain IV Access; Standing  -     sodium chloride 0.9 % flush 10 mL  -     sodium chloride 0.9 % flush 10 mL  -     sodium chloride 0.9 % infusion 40 mL  -     lidocaine PF 1% (XYLOCAINE) injection 0.5 mL  -     Vital Signs - See Instructions; Standing  -     Hold Medication Metformin (Glucophage, Glucophage XR, Fortament, Glumetza);  Metglip (metformin/glipizide);  Glucovance (metformin/glyburide); Avandamet (metformin/rosiglitazone); Standing  -     Patient May Discharge Home After Procedure Complete (If Stable); Standing  -     CT Angiogram Coronary; Future  -     CT Angiogram Coronary-Cardiology Interpretation; Future  -     metoprolol tartrate (LOPRESSOR)  tablet 200 mg  -     metoprolol tartrate (LOPRESSOR) tablet 150 mg  -     metoprolol tartrate (LOPRESSOR) tablet 100 mg  -     metoprolol tartrate (LOPRESSOR) tablet 50 mg  -     metoprolol tartrate (LOPRESSOR) tablet 25 mg  -     metoprolol tartrate (LOPRESSOR) tablet 50 mg  -     metoprolol tartrate (LOPRESSOR) injection 5 mg  -     nitroglycerin (NITROSTAT) SL tablet 0.4 mg  -     nitroglycerin (NITROSTAT) SL tablet 0.8 mg  -     POC Creatinine; Standing    2. Primary hypertension  Assessment & Plan:  Hypertension is stable and controlled  Continue current treatment regimen.  Dietary sodium restriction.  Weight loss.  Regular aerobic exercise.  Ambulatory blood pressure monitoring.  Blood pressure will be reassessed  at follow-up .    Orders:  -     Obtain Informed Consent - Computed Tomography Angiography of Chest - Angiogram of Coronary Arteries; Standing  -     Vital Signs Upon Arrival; Standing  -     Cardiac Monitoring; Standing  -     Notify CT After Administration of metoprolol tartrate (LOPRESSOR) tablet; Standing  -     No Caffeine or Nicotine 4 Hours Prior to CTA; Standing  -     Verify NPO Status - Patient to Be NPO at Least 4 Hours Prior to CTA; Standing  -     NPO Diet NPO Type: Strict NPO; Standing  -     Notify Provider If Total Metoprolol Given Equals 300mg & Heart Rate Not At Goal; Standing  -     Notify Provider Prior to Administration of Nitroglycerin if Patient SBP <80; Standing  -     Do Not Take Phosphodiasterase Inhibitors in the 72 Hours Prior to Coronary CTA  -     Insert Peripheral IV; Standing  -     Saline Lock & Maintain IV Access; Standing  -     sodium chloride 0.9 % flush 10 mL  -     sodium chloride 0.9 % flush 10 mL  -     sodium chloride 0.9 % infusion 40 mL  -     lidocaine PF 1% (XYLOCAINE) injection 0.5 mL  -     Vital Signs - See Instructions; Standing  -     Hold Medication Metformin (Glucophage, Glucophage XR, Fortament, Glumetza);  Metglip (metformin/glipizide);   Glucovance (metformin/glyburide); Avandamet (metformin/rosiglitazone); Standing  -     Patient May Discharge Home After Procedure Complete (If Stable); Standing  -     CT Angiogram Coronary; Future  -     CT Angiogram Coronary-Cardiology Interpretation; Future  -     metoprolol tartrate (LOPRESSOR) tablet 200 mg  -     metoprolol tartrate (LOPRESSOR) tablet 150 mg  -     metoprolol tartrate (LOPRESSOR) tablet 100 mg  -     metoprolol tartrate (LOPRESSOR) tablet 50 mg  -     metoprolol tartrate (LOPRESSOR) tablet 25 mg  -     metoprolol tartrate (LOPRESSOR) tablet 50 mg  -     metoprolol tartrate (LOPRESSOR) injection 5 mg  -     nitroglycerin (NITROSTAT) SL tablet 0.4 mg  -     nitroglycerin (NITROSTAT) SL tablet 0.8 mg  -     POC Creatinine; Standing    3. Mixed hyperlipidemia  Assessment & Plan:   Lipid abnormalities are improving with treatment    Plan:  Continue same medication/s without change.      Discussed medication dosage, use, side effects, and goals of treatment in detail.    Counseled patient on lifestyle modifications to help control hyperlipidemia.   Cholesterol lowering dietary information shared with patient.  Advised patient to exercise for 150 minutes weekly. (30 minute brisk walk, 5 days a week for example)  Weight Loss encouraged  Patient counseled in regards to heart healthy, low fat/ low cholesterol/low sat diet, daily exercise for 30 minutes, low to moderate intensity, and weight loss.  Lab Results   Component Value Date    LDL 99 12/22/2023    HDL 68 12/22/2023    CHLPL 183 12/22/2023    TRIG 87 12/22/2023      Patient Treatment Goals:   LDL goal is less than 70.  Consider ezetimibe 10 mg if severe CAD documented on CTA coronaries or cardiac cath.  Aggressive approach limited overall by poor functional status.    Followup in 6 months.    Orders:  -     Obtain Informed Consent - Computed Tomography Angiography of Chest - Angiogram of Coronary Arteries; Standing  -     Vital Signs Upon  Arrival; Standing  -     Cardiac Monitoring; Standing  -     Notify CT After Administration of metoprolol tartrate (LOPRESSOR) tablet; Standing  -     No Caffeine or Nicotine 4 Hours Prior to CTA; Standing  -     Verify NPO Status - Patient to Be NPO at Least 4 Hours Prior to CTA; Standing  -     NPO Diet NPO Type: Strict NPO; Standing  -     Notify Provider If Total Metoprolol Given Equals 300mg & Heart Rate Not At Goal; Standing  -     Notify Provider Prior to Administration of Nitroglycerin if Patient SBP <80; Standing  -     Do Not Take Phosphodiasterase Inhibitors in the 72 Hours Prior to Coronary CTA  -     Insert Peripheral IV; Standing  -     Saline Lock & Maintain IV Access; Standing  -     sodium chloride 0.9 % flush 10 mL  -     sodium chloride 0.9 % flush 10 mL  -     sodium chloride 0.9 % infusion 40 mL  -     lidocaine PF 1% (XYLOCAINE) injection 0.5 mL  -     Vital Signs - See Instructions; Standing  -     Hold Medication Metformin (Glucophage, Glucophage XR, Fortament, Glumetza);  Metglip (metformin/glipizide);  Glucovance (metformin/glyburide); Avandamet (metformin/rosiglitazone); Standing  -     Patient May Discharge Home After Procedure Complete (If Stable); Standing  -     CT Angiogram Coronary; Future  -     CT Angiogram Coronary-Cardiology Interpretation; Future  -     metoprolol tartrate (LOPRESSOR) tablet 200 mg  -     metoprolol tartrate (LOPRESSOR) tablet 150 mg  -     metoprolol tartrate (LOPRESSOR) tablet 100 mg  -     metoprolol tartrate (LOPRESSOR) tablet 50 mg  -     metoprolol tartrate (LOPRESSOR) tablet 25 mg  -     metoprolol tartrate (LOPRESSOR) tablet 50 mg  -     metoprolol tartrate (LOPRESSOR) injection 5 mg  -     nitroglycerin (NITROSTAT) SL tablet 0.4 mg  -     nitroglycerin (NITROSTAT) SL tablet 0.8 mg  -     POC Creatinine; Standing         Return in about 3 months (around 7/8/2024) for Next scheduled follow up, Follow-up with Dr Heard.    There are no Patient  Instructions on file for this visit.    Kenn Heard MD

## 2024-04-08 NOTE — ASSESSMENT & PLAN NOTE
Hypertension is stable and controlled  Continue current treatment regimen.  Dietary sodium restriction.  Weight loss.  Regular aerobic exercise.  Ambulatory blood pressure monitoring.  Blood pressure will be reassessed  at follow-up .

## 2024-04-10 ENCOUNTER — TELEPHONE (OUTPATIENT)
Dept: CARDIOLOGY | Facility: CLINIC | Age: 74
End: 2024-04-10
Payer: MEDICARE

## 2024-04-10 NOTE — TELEPHONE ENCOUNTER
CALL REGARDING CT ANGIOGRAM, MENTIONED EMAIL - REQUESTING FOR HER TO CALL TO SCHEDULE     PLEASE ADVISE PT

## 2024-04-10 NOTE — TELEPHONE ENCOUNTER
Returned patient's call. She is waiting for someone to call her to schedule for the HEART CATH. Patient also said she knows the CTA Coronary will be scheduled much later, Dr BALES made her aware.

## 2024-04-12 DIAGNOSIS — J43.9 PULMONARY EMPHYSEMA, UNSPECIFIED EMPHYSEMA TYPE: ICD-10-CM

## 2024-04-12 DIAGNOSIS — F41.9 ANXIETY: ICD-10-CM

## 2024-04-12 RX ORDER — ALPRAZOLAM 0.25 MG/1
0.25 TABLET ORAL 3 TIMES DAILY PRN
Qty: 90 TABLET | Refills: 2 | OUTPATIENT
Start: 2024-04-12

## 2024-04-18 ENCOUNTER — TELEPHONE (OUTPATIENT)
Dept: FAMILY MEDICINE CLINIC | Facility: CLINIC | Age: 74
End: 2024-04-18
Payer: MEDICARE

## 2024-04-18 NOTE — TELEPHONE ENCOUNTER
PA sent for Albuterol inhaler through cover my meds. Per cover my meds.Available without authorization. The member is able to fill the requested drug at the pharmacy.

## 2024-04-29 ENCOUNTER — TELEPHONE (OUTPATIENT)
Dept: FAMILY MEDICINE CLINIC | Facility: CLINIC | Age: 74
End: 2024-04-29
Payer: MEDICARE

## 2024-04-29 DIAGNOSIS — F43.29 ADJUSTMENT DISORDER WITH DISTURBANCE OF EMOTION: ICD-10-CM

## 2024-04-29 RX ORDER — MIRTAZAPINE 15 MG/1
15 TABLET, FILM COATED ORAL
Qty: 90 TABLET | Refills: 1 | Status: SHIPPED | OUTPATIENT
Start: 2024-04-29

## 2024-04-29 NOTE — TELEPHONE ENCOUNTER
----- Message from Wero Daley sent at 4/29/2024  1:25 PM EDT -----  Regarding: FW: Portable O2 Concentrator  Contact: [phone]    ----- Message -----  From: Precious Parmar  Sent: 4/29/2024  12:12 PM EDT  To: [facility]  Subject: Portable O2 Concentrator                         Dr. Daley, [facility] said they have not received the prescription for the portable oxygen concentrator. The tanks are just getting to be too cumbersome for me to handle & keep enough with me to last. I talked with the medical equipment dept this morning. His name was Horace & gave me a direct fax number to him. it is [phone].    Also my mirtazapine had no refills. I called it in to the pharmacy for you to ok the refill.  Hope this finds you well,  Precious Parmar

## 2024-04-29 NOTE — TELEPHONE ENCOUNTER
Please get her an order for a portable oxygen tank and send it to the [facility]

## 2024-05-13 ENCOUNTER — TELEPHONE (OUTPATIENT)
Dept: FAMILY MEDICINE CLINIC | Facility: CLINIC | Age: 74
End: 2024-05-13
Payer: MEDICARE

## 2024-05-13 DIAGNOSIS — J43.9 PULMONARY EMPHYSEMA, UNSPECIFIED EMPHYSEMA TYPE: ICD-10-CM

## 2024-05-13 DIAGNOSIS — F41.9 ANXIETY: ICD-10-CM

## 2024-05-13 RX ORDER — ALPRAZOLAM 0.25 MG/1
0.25 TABLET ORAL 3 TIMES DAILY PRN
Qty: 90 TABLET | Refills: 2 | Status: SHIPPED | OUTPATIENT
Start: 2024-05-13 | End: 2024-05-13 | Stop reason: SDUPTHER

## 2024-05-13 RX ORDER — ALPRAZOLAM 0.25 MG/1
0.25 TABLET ORAL 3 TIMES DAILY PRN
Qty: 90 TABLET | Refills: 2 | Status: SHIPPED | OUTPATIENT
Start: 2024-05-13

## 2024-05-13 RX ORDER — ALPRAZOLAM 0.25 MG/1
0.25 TABLET ORAL 3 TIMES DAILY PRN
Qty: 90 TABLET | Refills: 2 | Status: SHIPPED | OUTPATIENT
Start: 2024-05-13 | End: 2024-05-13

## 2024-05-28 DIAGNOSIS — F43.29 ADJUSTMENT DISORDER WITH DISTURBANCE OF EMOTION: ICD-10-CM

## 2024-05-28 RX ORDER — MIRTAZAPINE 15 MG/1
15 TABLET, FILM COATED ORAL
Qty: 30 TABLET | Refills: 0 | OUTPATIENT
Start: 2024-05-28

## 2024-05-29 DIAGNOSIS — F43.29 ADJUSTMENT DISORDER WITH DISTURBANCE OF EMOTION: ICD-10-CM

## 2024-05-29 RX ORDER — MIRTAZAPINE 15 MG/1
15 TABLET, FILM COATED ORAL
Qty: 90 TABLET | Refills: 1 | Status: SHIPPED | OUTPATIENT
Start: 2024-05-29 | End: 2024-05-29 | Stop reason: SDUPTHER

## 2024-05-29 RX ORDER — MIRTAZAPINE 15 MG/1
15 TABLET, FILM COATED ORAL
Qty: 90 TABLET | Refills: 1 | Status: SHIPPED | OUTPATIENT
Start: 2024-05-29

## 2024-06-03 DIAGNOSIS — I25.112 CORONARY ARTERY DISEASE INVOLVING NATIVE CORONARY ARTERY OF NATIVE HEART WITH REFRACTORY ANGINA PECTORIS: ICD-10-CM

## 2024-06-03 RX ORDER — RANOLAZINE 500 MG/1
500 TABLET, EXTENDED RELEASE ORAL 2 TIMES DAILY
Qty: 30 TABLET | Refills: 2 | Status: SHIPPED | OUTPATIENT
Start: 2024-06-03

## 2024-06-14 ENCOUNTER — CLINICAL SUPPORT (OUTPATIENT)
Dept: CARDIOLOGY | Facility: CLINIC | Age: 74
End: 2024-06-14
Payer: MEDICARE

## 2024-06-14 VITALS — HEART RATE: 102 BPM | DIASTOLIC BLOOD PRESSURE: 73 MMHG | SYSTOLIC BLOOD PRESSURE: 135 MMHG

## 2024-06-14 DIAGNOSIS — J43.9 PULMONARY EMPHYSEMA, UNSPECIFIED EMPHYSEMA TYPE: Primary | ICD-10-CM

## 2024-06-14 NOTE — PROGRESS NOTES
[facility]  [address] [place] [address]  Dept: [phone]  Dept Fax: [phone]    Date: 06/14/2024  Patient: Precious Parmar  YOB: 1950    Patient in office for shortness of breath.  Exam showing no volume overload, and significant wheezing.  Patient in significant distress.  COPD exacerbation likely.  Patient referred to the emergency room.    Procedure Note    ECG 12 Lead    Date/Time: 6/14/2024 5:54 PM  Performed by: Kenn Heard MD    Authorized by: Kenn Heard MD  Comparison: compared with previous ECG from 12/11/2023  Similar to previous ECG  Rhythm: sinus tachycardia  Conduction: left anterior fascicular block  Other findings: low voltage  Comments: Sinus tachycardia, low QRS voltage in limb leads, left anterior fascicular block, nonspecific ST-T changes           Assessment and Plan  Diagnoses and all orders for this visit:    1. Pulmonary emphysema, unspecified emphysema type (Primary)         Kenn Heard MD

## 2024-06-27 ENCOUNTER — OFFICE VISIT (OUTPATIENT)
Dept: FAMILY MEDICINE CLINIC | Facility: CLINIC | Age: 74
End: 2024-06-27
Payer: MEDICARE

## 2024-06-27 VITALS
HEART RATE: 93 BPM | DIASTOLIC BLOOD PRESSURE: 62 MMHG | SYSTOLIC BLOOD PRESSURE: 100 MMHG | OXYGEN SATURATION: 96 % | BODY MASS INDEX: 17.05 KG/M2 | HEIGHT: 59 IN | WEIGHT: 84.6 LBS

## 2024-06-27 DIAGNOSIS — R73.9 HYPERGLYCEMIA: ICD-10-CM

## 2024-06-27 DIAGNOSIS — R63.4 LOSS OF WEIGHT: ICD-10-CM

## 2024-06-27 DIAGNOSIS — J43.2 CENTRILOBULAR EMPHYSEMA: ICD-10-CM

## 2024-06-27 DIAGNOSIS — Z87.891 HISTORY OF TOBACCO USE DISORDER: ICD-10-CM

## 2024-06-27 DIAGNOSIS — Z12.31 ENCOUNTER FOR SCREENING MAMMOGRAM FOR MALIGNANT NEOPLASM OF BREAST: ICD-10-CM

## 2024-06-27 DIAGNOSIS — I25.112 CORONARY ARTERY DISEASE INVOLVING NATIVE CORONARY ARTERY OF NATIVE HEART WITH REFRACTORY ANGINA PECTORIS: ICD-10-CM

## 2024-06-27 DIAGNOSIS — Z00.00 PHYSICAL EXAM: ICD-10-CM

## 2024-06-27 DIAGNOSIS — Z00.00 MEDICARE ANNUAL WELLNESS VISIT, SUBSEQUENT: Primary | ICD-10-CM

## 2024-06-27 LAB
EXPIRATION DATE: NORMAL
HBA1C MFR BLD: 5.5 % (ref 4.5–5.7)
Lab: NORMAL

## 2024-06-27 PROCEDURE — 83036 HEMOGLOBIN GLYCOSYLATED A1C: CPT | Performed by: FAMILY MEDICINE

## 2024-06-27 PROCEDURE — 3078F DIAST BP <80 MM HG: CPT | Performed by: FAMILY MEDICINE

## 2024-06-27 PROCEDURE — 3044F HG A1C LEVEL LT 7.0%: CPT | Performed by: FAMILY MEDICINE

## 2024-06-27 PROCEDURE — 99214 OFFICE O/P EST MOD 30 MIN: CPT | Performed by: FAMILY MEDICINE

## 2024-06-27 PROCEDURE — G0439 PPPS, SUBSEQ VISIT: HCPCS | Performed by: FAMILY MEDICINE

## 2024-06-27 PROCEDURE — 3074F SYST BP LT 130 MM HG: CPT | Performed by: FAMILY MEDICINE

## 2024-06-27 PROCEDURE — 1126F AMNT PAIN NOTED NONE PRSNT: CPT | Performed by: FAMILY MEDICINE

## 2024-06-27 RX ORDER — RANOLAZINE 500 MG/1
500 TABLET, EXTENDED RELEASE ORAL 2 TIMES DAILY
Qty: 60 TABLET | Refills: 2 | Status: SHIPPED | OUTPATIENT
Start: 2024-06-27
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
## 2024-06-27 NOTE — PROGRESS NOTES
The ABCs of the Annual Wellness Visit  Subsequent Medicare Wellness Visit    Subjective    Precious Parmar is a 74 y.o. female who presents for a Subsequent Medicare Wellness Visit.    The following portions of the patient's history were reviewed and   updated as appropriate: allergies, current medications, past family history, past medical history, past social history, past surgical history, and problem list.    Compared to one year ago, the patient feels her physical   health is the same.    Compared to one year ago, the patient feels her mental   health is better.    Recent Hospitalizations:  She was not admitted to the hospital during the last year.       Current Medical Providers:  Patient Care Team:  Wero Daley MD as PCP - General (Family Medicine)  Emir Lindquist MD as Consulting Physician (Cardiology)    Outpatient Medications Prior to Visit   Medication Sig Dispense Refill    albuterol sulfate  (90 Base) MCG/ACT inhaler Inhale 2 puffs Every 4 (Four) Hours As Needed for Wheezing. Lupin  brand please 18 g 10    ALPRAZolam (XANAX) 0.25 MG tablet Take 1 tablet by mouth 3 (Three) Times a Day As Needed for Anxiety. 90 tablet 2    aspirin 81 MG EC tablet Take 1 tablet by mouth Daily. 90 tablet 3    Cholecalciferol 50 MCG (2000 UT) tablet Take 2 tablets by mouth Daily.      FOLIC ACID PO Take 800 mcg by mouth Daily. OTC      ipratropium-albuterol (DUO-NEB) 0.5-2.5 mg/3 ml nebulizer Take 3 mL by nebulization Every 4 (Four) Hours. 360 mL 1    metoprolol succinate XL (TOPROL-XL) 25 MG 24 hr tablet Take 1 tablet by mouth Daily. 90 tablet 3    mirtazapine (REMERON) 15 MG tablet Take 1 tablet by mouth every night at bedtime. 90 tablet 1    nitroglycerin (NITROSTAT) 0.4 MG SL tablet 1 under the tongue as needed for angina, may repeat q5mins for up three doses 100 tablet 11    O2 (OXYGEN) 2 L/min.      Omega-3 Fatty Acids (FISH OIL) 1000 MG capsule capsule Take 2 capsules by mouth Daily.      vitamin B-12  (CYANOCOBALAMIN) 1000 MCG tablet Take 1 tablet by mouth 1 (One) Time Per Week. Once a week      Zinc 50 MG tablet       ranolazine (RANEXA) 500 MG 12 hr tablet Take 1 tablet by mouth 2 (Two) Times a Day. 30 tablet 2     No facility-administered medications prior to visit.       No opioid medication identified on active medication list. I have reviewed chart for other potential  high risk medication/s and harmful drug interactions in the elderly.        Aspirin is on active medication list. Aspirin use is indicated based on review of current medical condition/s. Pros and cons of this therapy have been discussed today. Benefits of this medication outweigh potential harm.  Patient has been encouraged to continue taking this medication.  .      Patient Active Problem List   Diagnosis    Allergic rhinitis    CGD (chronic granulomatous disease)    Chronic respiratory failure    Chronic obstructive pulmonary emphysema    CAD (coronary artery disease)    Cough    Diverticular disease    Dyslipidemia    Dyspnea    Hypertension    Hypoxia    Osteopenia    Vitamin D deficiency    History of pneumonia    Anxiety    Chronic constipation    Chronic kidney disease, stage 3 (moderate)    Diverticular disease of colon    Encounter for screening mammogram for malignant neoplasm of breast    Bull hematuria    High risk medication use    History of renal calculi    History of tobacco use disorder    Hypoxemia    Kidney stone    Low vitamin B12 level    Mixed hyperlipidemia    Multiple renal cysts    Muscle pain    Non-toxic multinodular goiter    Senile osteoporosis    Urinary tract infectious disease    Loss of weight    Bilateral hearing loss    Hyperthyroidism    Adjustment disorder with disturbance of emotion    Chest pain    Hyperglycemia     Advance Care Planning   Advance Care Planning     Advance Directive is on file.  ACP discussion was held with the patient during this visit. Patient has an advance directive in EMR which is  "still valid.      Objective    Vitals:    24 1146   BP: 100/62   BP Location: Left arm   Patient Position: Sitting   Cuff Size: Adult   Pulse: 93   SpO2: 96%   Weight: 38.4 kg (84 lb 9.6 oz)   Height: 149.9 cm (59.02\")     Estimated body mass index is 17.08 kg/m² as calculated from the following:    Height as of this encounter: 149.9 cm (59.02\").    Weight as of this encounter: 38.4 kg (84 lb 9.6 oz).    BMI is below normal parameters (malnutrition). Recommendations: Information on healthy weight added to patient's after visit summary      Does the patient have evidence of cognitive impairment? No    Lab Results   Component Value Date    HGBA1C 5.5 2024        HEALTH RISK ASSESSMENT    Smoking Status:  Social History     Tobacco Use   Smoking Status Former    Current packs/day: 0.00    Average packs/day: 1 pack/day for 46.1 years (46.1 ttl pk-yrs)    Types: Cigarettes    Start date:     Quit date: 2014    Years since quitting: 10.4   Smokeless Tobacco Never     Alcohol Consumption:  Social History     Substance and Sexual Activity   Alcohol Use No     Fall Risk Screen:    DARLING Fall Risk Assessment was completed, and patient is at MODERATE risk for falls. Assessment completed on:2024    Depression Screenin/27/2024    11:28 AM   PHQ-2/PHQ-9 Depression Screening   Little Interest or Pleasure in Doing Things 0-->not at all   Feeling Down, Depressed or Hopeless 0-->not at all   PHQ-9: Brief Depression Severity Measure Score 0       Health Habits and Functional and Cognitive Screenin/20/2024     8:29 AM   Functional & Cognitive Status   Do you have difficulty preparing food and eating? No   Do you have difficulty bathing yourself, getting dressed or grooming yourself? No   Do you have difficulty using the toilet? No   Do you have difficulty moving around from place to place? No   Do you have trouble with steps or getting out of a bed or a chair? No   Current Diet Other   Dental " Exam Not up to date   Eye Exam Up to date   Exercise (times per week) Other   Current Exercises Include No Regular Exercise   Do you need help using the phone?  No   Are you deaf or do you have serious difficulty hearing?  Yes   Do you need help to go to places out of walking distance? Yes   Do you need help shopping? No   Do you need help preparing meals?  Yes   Do you need help with housework?  Yes   Do you need help with laundry? No   Do you need help taking your medications? No   Do you need help managing money? No   Do you ever drive or ride in a car without wearing a seat belt? No   Have you felt unusual stress, anger or loneliness in the last month? Yes   Who do you live with? Alone   If you need help, do you have trouble finding someone available to you? No   Have you been bothered in the last four weeks by sexual problems? No   Do you have difficulty concentrating, remembering or making decisions? No       Age-appropriate Screening Schedule:  Refer to the list below for future screening recommendations based on patient's age, sex and/or medical conditions. Orders for these recommended tests are listed in the plan section. The patient has been provided with a written plan.    Health Maintenance   Topic Date Due    ZOSTER VACCINE (1 of 2) [birth year]/19/1967    RSV Vaccine - Adults (1 - 1-dose 60+ series) Never done    LUNG CANCER SCREENING  09/24/2015    DXA SCAN  04/10/2020    COLORECTAL CANCER SCREENING  11/19/2020    COVID-19 Vaccine (5 - 2023-24 season) 09/01/2023    INFLUENZA VACCINE  08/01/2024    LIPID PANEL  12/22/2024    ANNUAL WELLNESS VISIT  06/27/2025    BMI FOLLOWUP  06/27/2025    TDAP/TD VACCINES (4 - Td or Tdap) 08/20/2033    HEPATITIS C SCREENING  Completed    Pneumococcal Vaccine 65+  Completed                  CMS Preventative Services Quick Reference  Risk Factors Identified During Encounter  None Identified  The above risks/problems have been discussed with the patient.  Pertinent information has  "been shared with the patient in the After Visit Summary.  An After Visit Summary and PPPS were made available to the patient.    Follow Up:   Next Medicare Wellness visit to be scheduled in 1 year.       Additional E&M Note during same encounter follows:  Patient has multiple medical problems which are significant and separately identifiable that require additional work above and beyond the Medicare Wellness Visit.      Chief Complaint  Medicare Wellness-subsequent and Follow-up    Subjective        HPI  Precious Parmar is also being seen today for Wellness and COPD    Review of Systems   Constitutional: Negative.    HENT: Negative.     Eyes: Negative.    Respiratory: Negative.  Positive for shortness of breath.    Cardiovascular: Negative.    Gastrointestinal: Negative.        Objective   Vital Signs:  /62 (BP Location: Left arm, Patient Position: Sitting, Cuff Size: Adult)   Pulse 93   Ht 149.9 cm (59.02\")   Wt 38.4 kg (84 lb 9.6 oz)   SpO2 96%   BMI 17.08 kg/m²     Physical Exam  Vitals and nursing note reviewed.   Constitutional:       Appearance: Normal appearance. She is ill-appearing.   HENT:      Head: Normocephalic and atraumatic.      Right Ear: Tympanic membrane, ear canal and external ear normal.      Left Ear: Tympanic membrane, ear canal and external ear normal.      Nose: Nose normal.      Mouth/Throat:      Mouth: Mucous membranes are moist.      Pharynx: Oropharynx is clear.   Eyes:      Extraocular Movements: Extraocular movements intact.      Conjunctiva/sclera: Conjunctivae normal.      Pupils: Pupils are equal, round, and reactive to light.   Cardiovascular:      Rate and Rhythm: Normal rate and regular rhythm.      Pulses: Normal pulses.      Heart sounds: Normal heart sounds.   Pulmonary:      Effort: Pulmonary effort is normal.      Breath sounds: Normal breath sounds.   Abdominal:      General: Abdomen is flat. Bowel sounds are normal.      Palpations: Abdomen is soft. "   Musculoskeletal:         General: Normal range of motion.      Cervical back: Normal range of motion and neck supple.   Feet:      Comments:      Skin:     General: Skin is warm and dry.   Neurological:      General: No focal deficit present.      Mental Status: She is alert and oriented to person, place, and time.   Psychiatric:         Mood and Affect: Mood normal.         Behavior: Behavior normal.         Thought Content: Thought content normal.         Judgment: Judgment normal.          The following data was reviewed by: Wero Daley MD on 06/27/2024:               Assessment and Plan   Diagnoses and all orders for this visit:    1. Medicare annual wellness visit, subsequent (Primary)  Assessment & Plan:  Schedule mammogram.    Orders:  -     Hemoglobin A1c; Future  -     Lipid Panel; Future  -     Comprehensive Metabolic Panel; Future  -     Vitamin B12; Future  -     TSH Rfx On Abnormal To Free T4; Future  -     CBC & Differential; Future    2. Coronary artery disease involving native coronary artery of native heart with refractory angina pectoris  Assessment & Plan:  Patient is scheduled for a cardiac catheterization.  I will recheck in 3 months.    Orders:  -     ranolazine (RANEXA) 500 MG 12 hr tablet; Take 1 tablet by mouth 2 (Two) Times a Day.  Dispense: 60 tablet; Refill: 2  -     Hemoglobin A1c; Future  -     Lipid Panel; Future  -     Comprehensive Metabolic Panel; Future  -     Vitamin B12; Future  -     TSH Rfx On Abnormal To Free T4; Future  -     CBC & Differential; Future    3. Physical exam  Assessment & Plan:  Schedule mammogram.    Orders:  -     Hemoglobin A1c; Future  -     Lipid Panel; Future  -     Comprehensive Metabolic Panel; Future  -     Vitamin B12; Future  -     TSH Rfx On Abnormal To Free T4; Future  -     CBC & Differential; Future  -     POC Glycosylated Hemoglobin (Hb A1C)    4. Hyperglycemia  Assessment & Plan:  1C is 5.5.  We will follow-up.    Orders:  -      Hemoglobin A1c; Future  -     Lipid Panel; Future  -     Comprehensive Metabolic Panel; Future  -     Vitamin B12; Future  -     TSH Rfx On Abnormal To Free T4; Future  -     CBC & Differential; Future    5. Centrilobular emphysema  Assessment & Plan:  Patient was recently seen in the ER for COPD exacerbation.  She is doing better now.  She is on home oxygen.  Recheck in 3 months.  Reviewed ER stay.    Orders:  -     Hemoglobin A1c; Future  -     Lipid Panel; Future  -     Comprehensive Metabolic Panel; Future  -     Vitamin B12; Future  -     TSH Rfx On Abnormal To Free T4; Future  -     CBC & Differential; Future    6. Encounter for screening mammogram for malignant neoplasm of breast  Assessment & Plan:  Schedule mammogram.    Orders:  -     Hemoglobin A1c; Future  -     Lipid Panel; Future  -     Comprehensive Metabolic Panel; Future  -     Vitamin B12; Future  -     TSH Rfx On Abnormal To Free T4; Future  -     CBC & Differential; Future    7. Loss of weight  Assessment & Plan:  We extensively reviewed her diet.  She is going to try and increase caloric intake.  Recheck in 3 months.    Orders:  -     Hemoglobin A1c; Future  -     Lipid Panel; Future  -     Comprehensive Metabolic Panel; Future  -     Vitamin B12; Future  -     TSH Rfx On Abnormal To Free T4; Future  -     CBC & Differential; Future    8. History of tobacco use disorder  -      CT Chest Low Dose Cancer Screening WO; Future  -     Hemoglobin A1c; Future  -     Lipid Panel; Future  -     Comprehensive Metabolic Panel; Future  -     Vitamin B12; Future  -     TSH Rfx On Abnormal To Free T4; Future  -     CBC & Differential; Future           I spent 10 minutes caring for Precious on this date of service. This time includes time spent by me in the following activities:preparing for the visit, reviewing tests, obtaining and/or reviewing a separately obtained history, performing a medically appropriate examination and/or evaluation , counseling and  educating the patient/family/caregiver, ordering medications, tests, or procedures, documenting information in the medical record, independently interpreting results and communicating that information with the patient/family/caregiver, and care coordination  Follow Up   No follow-ups on file.  Patient was given instructions and counseling regarding her condition or for health maintenance advice. Please see specific information pulled into the AVS if appropriate.

## 2024-06-27 NOTE — ASSESSMENT & PLAN NOTE
We extensively reviewed her diet.  She is going to try and increase caloric intake.  Recheck in 3 months.

## 2024-06-27 NOTE — ASSESSMENT & PLAN NOTE
Patient was recently seen in the ER for COPD exacerbation.  She is doing better now.  She is on home oxygen.  Recheck in 3 months.  Reviewed ER stay.   Average Average Average

## 2024-07-12 ENCOUNTER — HOSPITAL ENCOUNTER (OUTPATIENT)
Facility: HOSPITAL | Age: 74
Discharge: HOME | End: 2024-07-12
Payer: MEDICARE

## 2024-07-12 VITALS
TEMPERATURE: 97.5 F | DIASTOLIC BLOOD PRESSURE: 57 MMHG | BODY MASS INDEX: 16.8 KG/M2 | RESPIRATION RATE: 22 BRPM | WEIGHT: 83.33 LBS | HEART RATE: 71 BPM | HEIGHT: 59 IN | OXYGEN SATURATION: 95 % | SYSTOLIC BLOOD PRESSURE: 103 MMHG

## 2024-07-12 DIAGNOSIS — I25.112 CORONARY ARTERY DISEASE INVOLVING NATIVE CORONARY ARTERY OF NATIVE HEART WITH REFRACTORY ANGINA PECTORIS: ICD-10-CM

## 2024-07-12 DIAGNOSIS — E78.2 MIXED HYPERLIPIDEMIA: ICD-10-CM

## 2024-07-12 DIAGNOSIS — I10 PRIMARY HYPERTENSION: ICD-10-CM

## 2024-07-12 LAB — CREAT BLDA-MCNC: 0.8 MG/DL (ref 0.6–1.3)

## 2024-07-12 PROCEDURE — 25510000001 IOPAMIDOL PER 1 ML: Performed by: INTERNAL MEDICINE

## 2024-07-12 PROCEDURE — 82565 ASSAY OF CREATININE: CPT | Performed by: INTERNAL MEDICINE

## 2024-07-12 PROCEDURE — 75574 CT ANGIO HRT W/3D IMAGE: CPT

## 2024-07-12 RX ORDER — METOPROLOL TARTRATE 1 MG/ML
5 INJECTION, SOLUTION INTRAVENOUS
Status: DISCONTINUED | OUTPATIENT
Start: 2024-07-12 | End: 2024-07-13 | Stop reason: HOSPADM

## 2024-07-12 RX ORDER — SODIUM CHLORIDE 9 MG/ML
40 INJECTION, SOLUTION INTRAVENOUS AS NEEDED
Status: DISCONTINUED | OUTPATIENT
Start: 2024-07-12 | End: 2024-07-13 | Stop reason: HOSPADM

## 2024-07-12 RX ORDER — LIDOCAINE HYDROCHLORIDE 10 MG/ML
0.5 INJECTION, SOLUTION EPIDURAL; INFILTRATION; INTRACAUDAL; PERINEURAL ONCE AS NEEDED
Status: DISCONTINUED | OUTPATIENT
Start: 2024-07-12 | End: 2024-07-13 | Stop reason: HOSPADM

## 2024-07-12 RX ORDER — METOPROLOL TARTRATE 100 MG/1
100 TABLET ORAL ONCE
Status: COMPLETED | OUTPATIENT
Start: 2024-07-12 | End: 2024-07-12

## 2024-07-12 RX ORDER — SODIUM CHLORIDE 0.9 % (FLUSH) 0.9 %
10 SYRINGE (ML) INJECTION EVERY 12 HOURS SCHEDULED
Status: DISCONTINUED | OUTPATIENT
Start: 2024-07-12 | End: 2024-07-13 | Stop reason: HOSPADM

## 2024-07-12 RX ORDER — NITROGLYCERIN 0.4 MG/1
0.4 TABLET SUBLINGUAL
Status: COMPLETED | OUTPATIENT
Start: 2024-07-12 | End: 2024-07-12

## 2024-07-12 RX ORDER — SODIUM CHLORIDE 0.9 % (FLUSH) 0.9 %
10 SYRINGE (ML) INJECTION AS NEEDED
Status: DISCONTINUED | OUTPATIENT
Start: 2024-07-12 | End: 2024-07-13 | Stop reason: HOSPADM

## 2024-07-12 RX ORDER — METOPROLOL TARTRATE 100 MG/1
200 TABLET ORAL ONCE
Status: COMPLETED | OUTPATIENT
Start: 2024-07-12 | End: 2024-07-12

## 2024-07-12 RX ORDER — NITROGLYCERIN 0.4 MG/1
0.8 TABLET SUBLINGUAL
Status: COMPLETED | OUTPATIENT
Start: 2024-07-12 | End: 2024-07-12

## 2024-07-12 RX ADMIN — NITROGLYCERIN 0.8 MG: 0.4 TABLET, ORALLY DISINTEGRATING SUBLINGUAL at 10:23

## 2024-07-12 RX ADMIN — METOPROLOL TARTRATE 200 MG: 100 TABLET, FILM COATED ORAL at 08:10

## 2024-07-12 RX ADMIN — IOPAMIDOL 65 ML: 755 INJECTION, SOLUTION INTRAVENOUS at 11:37

## 2024-07-12 RX ADMIN — METOPROLOL TARTRATE 50 MG: 25 TABLET, FILM COATED ORAL at 08:56

## 2024-07-15 ENCOUNTER — TELEPHONE (OUTPATIENT)
Dept: CARDIOLOGY | Facility: CLINIC | Age: 74
End: 2024-07-15
Payer: MEDICARE

## 2024-07-15 ENCOUNTER — HOSPITAL ENCOUNTER (OUTPATIENT)
Dept: CT IMAGING | Facility: HOSPITAL | Age: 74
Discharge: HOME | End: 2024-07-15
Admitting: INTERNAL MEDICINE
Payer: MEDICARE

## 2024-07-15 DIAGNOSIS — R93.1 ABNORMAL FINDINGS DIAGNOSTIC IMAGING OF HEART AND CORONARY CIRCULATION: Primary | ICD-10-CM

## 2024-07-15 DIAGNOSIS — R93.1 ABNORMAL FINDINGS DIAGNOSTIC IMAGING OF HEART AND CORONARY CIRCULATION: ICD-10-CM

## 2024-07-15 PROCEDURE — 75580 N-INVAS EST C FFR SW ALY CTA: CPT | Performed by: INTERNAL MEDICINE

## 2024-07-15 PROCEDURE — 75580 N-INVAS EST C FFR SW ALY CTA: CPT

## 2024-07-15 NOTE — TELEPHONE ENCOUNTER
----- Message from Kenn Fernandaiajo ann sent at 7/15/2024  2:05 PM EDT -----  Please inform patient that her Coronary CT angiography was normal except at the site of the LAD stent, where they could not see inside the stent.  We were suspecting a heart attack on the stress test, but there is flow in the corresponding vessel so likely the stress test is a false positive.  Will continue medical therapy; and if patient agreeable we can try additional cholesterol lowering medication namely ezetimibe to further lower her cholesterol and get it to target less than 70 mg/dL.  On the other hand they did identify a lung nodule of 1.4 cm on the noncardiac portion of the CT, and that should be followed by the primary care provider with repeat imaging in 6 months.  This nodule could be responsible of her chest pains as well.  Thank you!

## 2024-07-15 NOTE — TELEPHONE ENCOUNTER
----- Message from Neel Rocha sent at 7/12/2024  5:34 PM EDT -----  Regarding: Heartflow   I need Heartflow analysis for the LAD for this patient.

## 2024-07-17 DIAGNOSIS — I25.112 CORONARY ARTERY DISEASE INVOLVING NATIVE CORONARY ARTERY OF NATIVE HEART WITH REFRACTORY ANGINA PECTORIS: ICD-10-CM

## 2024-07-18 NOTE — TELEPHONE ENCOUNTER
Pt notified of results. Verbalized understanding. Pt does not want to start any new medication at this time. She states she will discuss with her pcp and call us to let us know what she decides to do.

## 2024-07-22 RX ORDER — RANOLAZINE 500 MG/1
500 TABLET, EXTENDED RELEASE ORAL 2 TIMES DAILY
Qty: 60 TABLET | Refills: 2 | OUTPATIENT
Start: 2024-07-22

## 2024-07-30 ENCOUNTER — TELEPHONE (OUTPATIENT)
Dept: CARDIOLOGY | Facility: CLINIC | Age: 74
End: 2024-07-30
Payer: MEDICARE

## 2024-07-30 NOTE — TELEPHONE ENCOUNTER
Caller: Precious Parmar    Relationship: Self    Best call back number: [phone] (home) [phone] (work)    What is the best time to reach you: ANYTIME    Who are you requesting to speak with (clinical staff, provider,  specific staff member): CLINICAL    What was the call regarding: PT IS ASKING FOR A CALL BACK TO DISCUSS JULY TEST RESULTS WHEN AVAILABLE.

## 2024-07-30 NOTE — TELEPHONE ENCOUNTER
Spoke with patient regarding family with covid, family was her ride to appointment so she had to reschedule.

## 2024-08-05 DIAGNOSIS — F41.9 ANXIETY: ICD-10-CM

## 2024-08-05 DIAGNOSIS — J43.9 PULMONARY EMPHYSEMA, UNSPECIFIED EMPHYSEMA TYPE: ICD-10-CM

## 2024-08-05 RX ORDER — ALPRAZOLAM 0.25 MG/1
0.25 TABLET ORAL 3 TIMES DAILY PRN
Qty: 90 TABLET | Refills: 2 | Status: SHIPPED | OUTPATIENT
Start: 2024-08-05

## 2024-08-08 ENCOUNTER — OFFICE VISIT (OUTPATIENT)
Dept: CARDIOLOGY | Facility: CLINIC | Age: 74
End: 2024-08-08
Payer: MEDICARE

## 2024-08-08 VITALS
WEIGHT: 86.2 LBS | HEIGHT: 59 IN | RESPIRATION RATE: 18 BRPM | HEART RATE: 72 BPM | SYSTOLIC BLOOD PRESSURE: 130 MMHG | BODY MASS INDEX: 17.38 KG/M2 | DIASTOLIC BLOOD PRESSURE: 78 MMHG | OXYGEN SATURATION: 94 %

## 2024-08-08 DIAGNOSIS — I10 PRIMARY HYPERTENSION: ICD-10-CM

## 2024-08-08 DIAGNOSIS — I25.112 CORONARY ARTERY DISEASE INVOLVING NATIVE CORONARY ARTERY OF NATIVE HEART WITH REFRACTORY ANGINA PECTORIS: Primary | ICD-10-CM

## 2024-08-08 DIAGNOSIS — E78.2 MIXED HYPERLIPIDEMIA: ICD-10-CM

## 2024-08-08 PROCEDURE — 3078F DIAST BP <80 MM HG: CPT | Performed by: INTERNAL MEDICINE

## 2024-08-08 PROCEDURE — 3075F SYST BP GE 130 - 139MM HG: CPT | Performed by: INTERNAL MEDICINE

## 2024-08-08 PROCEDURE — 1160F RVW MEDS BY RX/DR IN RCRD: CPT | Performed by: INTERNAL MEDICINE

## 2024-08-08 PROCEDURE — 1159F MED LIST DOCD IN RCRD: CPT | Performed by: INTERNAL MEDICINE

## 2024-08-08 PROCEDURE — 99214 OFFICE O/P EST MOD 30 MIN: CPT | Performed by: INTERNAL MEDICINE

## 2024-08-08 RX ORDER — RANOLAZINE 1000 MG/1
1000 TABLET, EXTENDED RELEASE ORAL 2 TIMES DAILY
Qty: 180 TABLET | Refills: 3 | Status: SHIPPED | OUTPATIENT
Start: 2024-08-08

## 2024-08-08 NOTE — ASSESSMENT & PLAN NOTE
Coronary artery disease:  November 2006: [facility] with [facility]CA for septal  2.5 x 8 Cypher to ostium of LAD/RAMUS.  Normal LVEF.   April 2011: [facility] per [facility]:  Normal perfusion, ejection fraction preserved.  9/14 echo wnl IDD  Coronary artery disease is unchanged. CCS 2 angina, with chest pain on mild daily activities.  Stress test showing old myocardial infarction but no ischemia.  Echo very limited in view of poor windows, so unable to detect regional motion modalities there, repeat with contrast showing no regional motion normalities, possibly related to severe ischemia versus small infarction not seen on echo.  Coronary CTA negative for severe CAD except at the site of the prior stent, that was not seen very well.  Severe emphysema complicating the presentation.  Patient pacing herself to avoid anginal events.  Chest pain events variable between days, Monday able to walk around the house no problems, another day chest pain walking few steps, suggesting possible pulmonary origin with variable COPD/emphysema disease control.  High risk for cardiac catheterization in view of weight loss/cachexia/poor appetite and severe emphysema.  Continue metoprolol succinate 25 mg 1 tab daily, aspirin 81 mg p.o. daily.    Uptitrate ranolazine to 1000 mg p.o. twice daily.  Consider low-dose nondihydropyridine calcium channel blockers next visit for rate control, chest pain and blood pressure control.  LDL to goal so deferring ezetimibe for now.  Discussed with patient extensively additional workup of type cardiac catheterization and agreeable to optimize medical therapy for now and monitor symptoms.  If worsening, can consider cardiac catheterization for diagnostic and therapeutic purposes.

## 2024-08-08 NOTE — ASSESSMENT & PLAN NOTE
Lipid abnormalities are improving with treatment     Plan:  Continue same medication/s without change.       Discussed medication dosage, use, side effects, and goals of treatment in detail.    Counseled patient on lifestyle modifications to help control hyperlipidemia.   Cholesterol lowering dietary information shared with patient.  Advised patient to exercise for 150 minutes weekly. (30 minute brisk walk, 5 days a week for example)  Weight Loss encouraged  Patient counseled in regards to heart healthy, low fat/ low cholesterol/low sat diet, daily exercise for 30 minutes, low to moderate intensity, and weight loss.    Lab Results   Component Value Date    LDL 99 12/22/2023    LDL 79 06/21/2023    LDL 93 12/09/2022    HDL 68 12/22/2023    HDL 76 06/21/2023    HDL 64 12/09/2022    CHLPL 183 12/22/2023    CHLPL 168 06/21/2023    CHLPL 173 12/09/2022    TRIG 87 12/22/2023    TRIG 69 06/21/2023    TRIG 85 12/09/2022       Patient Treatment Goals:   LDL goal is less than 70.  Last lipid profile to goal inpatient. Hold off additional lipid-lowering therapy.       Followup in 6 months

## 2024-08-08 NOTE — PROGRESS NOTES
[id number]  [facility] CARD [facility]  [facility] CARDIOLOGY  [address] [place] [address]  Dept: [phone]  Dept Fax: [phone]    Date: 08/08/2024  Patient: Precious Parmar  YOB: 1950    Follow Up Office Visit Note    Interval Follow-up  Ms. Precious Parmar is a 74 y.o. female who is here for follow-up on Chest Pain.    Subjective   Patient complaining of chest pain and shortness of breath on mild activities.  Chest pain, mild intensity, sternal resolves usually within 1 minute of rest, linked usually to the shortness of breath.  Patient denies orthopnea, PND, palpitations, lightheadedness, syncope or medications side-effects.    The following portions of the patient's history were reviewed and updated as appropriate: allergies, current medications, past family history, past medical history, past social history, past surgical history, and problem list.    Medications:   Allergies   Allergen Reactions    Ceclor [Cefaclor]     Cephalosporins Unknown - High Severity    Codeine     Diltiazem Swelling    Olodaterol Unknown - High Severity    Penicillins     Statins     Tiotropium Unknown - High Severity      Current Outpatient Medications   Medication Instructions    albuterol sulfate  (90 Base) MCG/ACT inhaler 2 puffs, Inhalation, Every 4 Hours PRN, Lupin  brand please    ALPRAZolam (XANAX) 0.25 mg, Oral, 3 Times Daily PRN    aspirin 81 mg, Oral, Daily    Cholecalciferol 50 MCG (2000 UT) tablet 2 tablets, Oral, Daily    fish oil 2,000 mg, Oral, Daily    FOLIC ACID  mcg, Oral, Daily, OTC     ipratropium-albuterol (DUO-NEB) 0.5-2.5 mg/3 ml nebulizer 3 mL, Nebulization, Every 4 Hours    metoprolol succinate XL (TOPROL-XL) 25 mg, Oral, Daily    mirtazapine (REMERON) 15 mg, Oral, Every Night at Bedtime    nitroglycerin (NITROSTAT) 0.4 MG SL tablet 1 under the tongue as needed for angina, may repeat q5mins for up three doses    O2 (OXYGEN) 2 L/min.    ranolazine (RANEXA)  "1,000 mg, Oral, 2 Times Daily    vitamin B-12 (CYANOCOBALAMIN) 1,000 mcg, Oral, Weekly, Once a week    Zinc 50 MG tablet No dose, route, or frequency recorded.       Tobacco Use: Medium Risk (8/8/2024)    Patient History     Smoking Tobacco Use: Former     Smokeless Tobacco Use: Never     Passive Exposure: Not on file        Objective  Vitals:    08/08/24 1429   BP: 130/78   Pulse: 72   Resp: 18   SpO2: 94%  Comment: 2 liter   Weight: 39.1 kg (86 lb 3.2 oz)   Height: 149.9 cm (59\")   PainSc: 0-No pain      Vitals:    08/08/24 1429   BP: 130/78   Pulse: 72   Resp: 18   SpO2: 94%   Weight: 39.1 kg (86 lb 3.2 oz)   Height: 149.9 cm (59\")          Physical Exam  Constitutional:       Appearance: Not in distress.   Neck:      Vascular: JVD normal.   Pulmonary:      Breath sounds: Normal breath sounds.   Cardiovascular:      Tachycardia present. Regular rhythm.      Murmurs: There is no murmur.   Pulses:     Intact distal pulses.   Edema:     Peripheral edema absent.   Neurological:      Mental Status: Alert.              Diagnostic Data  Lab Results   Component Value Date     12/22/2023    K 4.4 12/22/2023    CL 96 12/22/2023    CO2 30 (H) 12/22/2023    BUN 17 12/22/2023    CREATININE 0.80 07/12/2024    CALCIUM 9.9 12/22/2023    BILITOT 0.5 12/22/2023    ALKPHOS 88 12/22/2023    ALT 11 12/22/2023    AST 15 12/22/2023    GLUCOSE 104 (H) 12/22/2023    ALBUMIN 4.6 12/22/2023     Lab Results   Component Value Date    WBC 5.9 12/22/2023    HGB 14.1 12/22/2023    HCT 42.8 12/22/2023     12/22/2023     No results found for: \"APTT\", \"INR\", \"PTT\"  No results found for: \"CKTOTAL\", \"TROPONINI\", \"TROPONINT\", \"CKMBINDEX\", \"BNP\"  No results found for: \"BNP\", \"PROBNP\"    Lab Results   Component Value Date    CHLPL 183 12/22/2023    TRIG 87 12/22/2023    HDL 68 12/22/2023    LDL 99 12/22/2023     Lab Results   Component Value Date    TSH 0.577 12/22/2023    FREET4 1.36 12/09/2022       CV Diagnostics:  Procedures    CXR: No " results found for this or any previous visit.     ECHO/MUGA: Results for orders placed in visit on 12/20/23    Adult Transthoracic Echo Complete W/ Cont if Necessary Per Protocol    Interpretation Summary    Poor windows; incomplete study.    Normal LV size and function, ejection fraction estimated by 2D, 56 - 60%.    Mild to moderate mitral valve regurgitation.    Left ventricular diastolic function was not assessed.     STRESS TESTS: No results found for this or any previous visit.     CARDIAC CATH: No results found for this or any previous visit.     DEVICES: No valid procedures specified.   HOLTER: No results found for this or any previous visit.     CT/MRI:  Results for orders placed during the hospital encounter of 07/12/24    CT Angiogram Coronary    Narrative  CT ANGIOGRAM CORONARY    Date of Exam: 7/12/2024 10:15 AM EDT    Indication: . Chest pain    Comparison: None available.    Technique: Non-contrasted CT images of the chest were performed for calcium scoring purposes followed by CTA images of the chest after the uneventful intravenous administration of contrast . Reconstructed coronal and sagittal images were also obtained.  In addition, a 3-D volume rendered image was created for interpretation. Automated exposure control and iterative reconstruction methods were used.      Findings:  Aorta is normal in caliber. The great vessels are unremarkable. Pulmonary artery is normal in caliber. Left atrium is unremarkable. No adenopathy is identified. Esophagus is unremarkable. Status post left thyroidectomy. Several right thyroid nodules are  identified. Limited views of the upper abdomen are unremarkable. Severe changes of emphysema. There is nodular area of scarring within the left upper lobe measuring 1.4 cm in diameter. No other suspicious pulmonary nodules. No aggressive appearing lytic  or sclerotic bone lesions are identified.    Impression  Impression:    1. Nodular area of scarring in the left lung  apex measuring 1.4 cm. Recommend correlation with prior outside imaging for stability. If there are no priors, recommend a 6-month follow-up for stability.    Please refer to CT Angiogram Coronary   Cardiology Interp report for information on cardiac structures.    Electronically Signed: Steven Elizalde MD  7/15/2024 1:12 PM EDT  Workstation ID: [id number]    VASCULAR: No valid procedures specified.     Assessment and Plan  Diagnoses and all orders for this visit:    1. Coronary artery disease involving native coronary artery of native heart with refractory angina pectoris (Primary)  Assessment & Plan:  Coronary artery disease:  November 2006: C with PTCA for septal  2.5 x 8 Cypher to ostium of LAD/RAMUS.  Normal LVEF.   April 2011: MPS per [facility]:  Normal perfusion, ejection fraction preserved.  9/14 echo wnl IDD  Coronary artery disease is unchanged. CCS 2 angina, with chest pain on mild daily activities.  Stress test showing old myocardial infarction but no ischemia.  Echo very limited in view of poor windows, so unable to detect regional motion modalities there, repeat with contrast showing no regional motion normalities, possibly related to severe ischemia versus small infarction not seen on echo.  Coronary CTA negative for severe CAD except at the site of the prior stent, that was not seen very well.  Severe emphysema complicating the presentation.  Patient pacing herself to avoid anginal events.  Chest pain events variable between days, Monday able to walk around the house no problems, another day chest pain walking few steps, suggesting possible pulmonary origin with variable COPD/emphysema disease control.  High risk for cardiac catheterization in view of weight loss/cachexia/poor appetite and severe emphysema.  Continue metoprolol succinate 25 mg 1 tab daily, aspirin 81 mg p.o. daily.    Uptitrate ranolazine to 1000 mg p.o. twice daily.  Consider low-dose nondihydropyridine calcium channel blockers  next visit for rate control, chest pain and blood pressure control.  LDL to goal so deferring ezetimibe for now.  Discussed with patient extensively additional workup of type cardiac catheterization and agreeable to optimize medical therapy for now and monitor symptoms.  If worsening, can consider cardiac catheterization for diagnostic and therapeutic purposes.    Orders:  -     ranolazine (RANEXA) 1000 MG 12 hr tablet; Take 1 tablet by mouth 2 (Two) Times a Day.  Dispense: 180 tablet; Refill: 3    2. Primary hypertension  Assessment & Plan:  Hypertension is stable and controlled  Continue current treatment regimen.  Dietary sodium restriction.  Regular aerobic exercise.  Ambulatory blood pressure monitoring.  Blood pressure will be reassessed in 3 months.      3. Mixed hyperlipidemia  Assessment & Plan:   Lipid abnormalities are improving with treatment     Plan:  Continue same medication/s without change.       Discussed medication dosage, use, side effects, and goals of treatment in detail.    Counseled patient on lifestyle modifications to help control hyperlipidemia.   Cholesterol lowering dietary information shared with patient.  Advised patient to exercise for 150 minutes weekly. (30 minute brisk walk, 5 days a week for example)  Weight Loss encouraged  Patient counseled in regards to heart healthy, low fat/ low cholesterol/low sat diet, daily exercise for 30 minutes, low to moderate intensity, and weight loss.    Lab Results   Component Value Date    LDL 99 12/22/2023    LDL 79 06/21/2023    LDL 93 12/09/2022    HDL 68 12/22/2023    HDL 76 06/21/2023    HDL 64 12/09/2022    CHLPL 183 12/22/2023    CHLPL 168 06/21/2023    CHLPL 173 12/09/2022    TRIG 87 12/22/2023    TRIG 69 06/21/2023    TRIG 85 12/09/2022       Patient Treatment Goals:   LDL goal is less than 70.  Last lipid profile to goal inpatient. Hold off additional lipid-lowering therapy.       Followup in 6 months           Return in about 3 months  (around 11/8/2024) for Follow-up with Dr Heard.    There are no Patient Instructions on file for this visit.    Kenn Heard MD

## 2024-08-12 DIAGNOSIS — J43.9 PULMONARY EMPHYSEMA, UNSPECIFIED EMPHYSEMA TYPE: ICD-10-CM

## 2024-08-12 DIAGNOSIS — F41.9 ANXIETY: ICD-10-CM

## 2024-08-12 RX ORDER — ALPRAZOLAM 0.25 MG/1
0.25 TABLET ORAL 3 TIMES DAILY PRN
Qty: 90 TABLET | Refills: 2 | Status: CANCELLED | OUTPATIENT
Start: 2024-08-12

## 2024-09-14 DIAGNOSIS — R06.2 WHEEZING: ICD-10-CM

## 2024-09-17 RX ORDER — ALBUTEROL SULFATE 90 UG/1
AEROSOL, METERED RESPIRATORY (INHALATION)
Qty: 8.5 G | Refills: 0 | Status: SHIPPED | OUTPATIENT
Start: 2024-09-17

## 2024-10-01 ENCOUNTER — OFFICE VISIT (OUTPATIENT)
Dept: FAMILY MEDICINE CLINIC | Facility: CLINIC | Age: 74
End: 2024-10-01
Payer: MEDICARE

## 2024-10-01 VITALS
SYSTOLIC BLOOD PRESSURE: 128 MMHG | WEIGHT: 86.9 LBS | DIASTOLIC BLOOD PRESSURE: 84 MMHG | HEART RATE: 72 BPM | HEIGHT: 59 IN | BODY MASS INDEX: 17.52 KG/M2 | RESPIRATION RATE: 16 BRPM | OXYGEN SATURATION: 95 %

## 2024-10-01 DIAGNOSIS — E78.2 MIXED HYPERLIPIDEMIA: ICD-10-CM

## 2024-10-01 DIAGNOSIS — I10 PRIMARY HYPERTENSION: Primary | ICD-10-CM

## 2024-10-01 DIAGNOSIS — M81.0 POST-MENOPAUSAL OSTEOPOROSIS: ICD-10-CM

## 2024-10-01 DIAGNOSIS — E55.9 VITAMIN D DEFICIENCY: ICD-10-CM

## 2024-10-01 DIAGNOSIS — R79.89 LOW VITAMIN B12 LEVEL: ICD-10-CM

## 2024-10-01 DIAGNOSIS — J43.2 CENTRILOBULAR EMPHYSEMA: ICD-10-CM

## 2024-10-01 DIAGNOSIS — R73.9 HYPERGLYCEMIA: ICD-10-CM

## 2024-10-01 DIAGNOSIS — Z12.31 ENCOUNTER FOR SCREENING MAMMOGRAM FOR MALIGNANT NEOPLASM OF BREAST: ICD-10-CM

## 2024-10-01 DIAGNOSIS — R63.4 LOSS OF WEIGHT: ICD-10-CM

## 2024-10-01 PROCEDURE — 99214 OFFICE O/P EST MOD 30 MIN: CPT | Performed by: FAMILY MEDICINE

## 2024-10-01 PROCEDURE — G2211 COMPLEX E/M VISIT ADD ON: HCPCS | Performed by: FAMILY MEDICINE

## 2024-10-01 PROCEDURE — 1126F AMNT PAIN NOTED NONE PRSNT: CPT | Performed by: FAMILY MEDICINE

## 2024-10-01 PROCEDURE — 3079F DIAST BP 80-89 MM HG: CPT | Performed by: FAMILY MEDICINE

## 2024-10-01 PROCEDURE — 3074F SYST BP LT 130 MM HG: CPT | Performed by: FAMILY MEDICINE

## 2024-10-01 NOTE — PROGRESS NOTES
Patient Name: Precious Parmar  : 1950   MRN: [id number]     Chief Complaint:    Chief Complaint   Patient presents with    Follow-up    Tingling and Numbness in LT Fingers       History of Present Illness: Precious Parmar is a 74 y.o. female who is here today for follow up.  Follow-upAssociated symptoms include: fatigue.           Review of Systems:   Review of Systems   Constitutional: Negative.  Positive for fatigue.   HENT: Negative.     Eyes: Negative.    Respiratory: Negative.     Cardiovascular: Negative.    Gastrointestinal: Negative.    Neurological: Negative.         Past Medical History:   Past Medical History:   Diagnosis Date    Age-related osteoporosis without current pathological fracture     Allergic rhinitis     Anxiety     AR (allergic rhinitis)     Chronic constipation     Chronic constipation     Chronic granulomatous disease     CKD (chronic kidney disease), stage III     COLD (chronic obstructive lung disease)     COPD (chronic obstructive pulmonary disease)     Coronary arteriosclerosis in native artery     Coronary artery disease     Diverticular disease of colon     Diverticulosis     SEVERE    Dyslipidemia     Emphysema lung     Encounter for immunization 2018    Endometriosis     High risk medication use     History of chest x-ray 2014    chronic change, no active disease     History of chest x-ray 2014    no acute change; findings consistent with COPD     History of chest x-ray 2014    no evidence of acute cardiopulmonary disease     History of echocardiogram 2014    EF 50-55%; E to A reversal in MV flow pattern suggestive of diastolic dysfunction    History of PFTs 2015    FVC 2.29 L 89%, FEV1 0.67 L, 34%, ratio 29%, after Xopenex HFA FEV1 improves to 0.81 L or 42%    History of PFTs 06/15/2015    data is acceptable and reproducible; pt given 4 puffs of albuterol; pt gave good effort.     History of PFTs 2014    data is acceptable and  reproducible; pt given 3 puffs of xopenex; pt gave good effort     Hyperlipidemia     MIXED    Hypertension     Hypothyroidism     Hypoxemia requiring supplemental oxygen     Kidney stone     Muscle pain     Myalgia     ARTHRALGIA    Nephrolithiasis     Non-toxic multinodular goiter 03/2017    NORMAL THYROID FUNCTION    Osteoporosis     Panacinar emphysema     Renal mass     L/LOWER    Senile osteoporosis     Statin intolerance     Tobacco use disorder     QUIT 2014    Vitamin B12 deficiency     Vitamin D deficiency        Past Surgical History:   Past Surgical History:   Procedure Laterality Date    CARDIAC CATHETERIZATION      CORONARY ANGIOPLASTY WITH STENT PLACEMENT      Status post diagnostic left heart catheterization, selective coronary angiography, left ventriculography, and stenting on 11/29/06, performed by Dr. Emir Lindquist.B.    HYSTERECTOMY      Age 20    OOPHORECTOMY      Age 20    ROTATOR CUFF REPAIR  2000    TONSILLECTOMY         Family History:   Family History   Problem Relation Age of Onset    Heart attack Mother     Stroke Mother     Hypertension Mother     Arthritis Mother     Heart disease Mother     Kidney disease Mother     Arthritis Father     Cancer Brother     Hypertension Brother     Hypertension Brother     Breast cancer Paternal Aunt         age unknown     Cancer Other     Ovarian cancer Neg Hx        Social History:   Social History     Socioeconomic History    Marital status:    Tobacco Use    Smoking status: Former     Current packs/day: 0.00     Average packs/day: 1 pack/day for 46.1 years (46.1 ttl pk-yrs)     Types: Cigarettes     Start date: 1968     Quit date: 2/2/2014     Years since quitting: 10.6    Smokeless tobacco: Never   Vaping Use    Vaping status: Never Used    Passive vaping exposure: Yes   Substance and Sexual Activity    Alcohol use: No    Drug use: No    Sexual activity: Never       Medications:     Current Outpatient Medications:     albuterol sulfate HFA  "108 (90 Base) MCG/ACT inhaler, INHALE 2 PUFFS BY MOUTH EVERY FOUR HOURS AS NEEDED FOR WHEEZING *LUPIN BRAND ONLY*, Disp: 8.5 g, Rfl: 0    ALPRAZolam (XANAX) 0.25 MG tablet, TAKE 1 TABLET BY MOUTH 3 (THREE) TIMES A DAY AS NEEDED FOR ANXIETY., Disp: 90 tablet, Rfl: 2    aspirin 81 MG EC tablet, Take 1 tablet by mouth Daily., Disp: 90 tablet, Rfl: 3    Cholecalciferol 50 MCG (2000 UT) tablet, Take 2 tablets by mouth Daily., Disp: , Rfl:     FOLIC ACID PO, Take 800 mcg by mouth Daily. OTC, Disp: , Rfl:     ipratropium-albuterol (DUO-NEB) 0.5-2.5 mg/3 ml nebulizer, Take 3 mL by nebulization Every 4 (Four) Hours., Disp: 360 mL, Rfl: 1    metoprolol succinate XL (TOPROL-XL) 25 MG 24 hr tablet, Take 1 tablet by mouth Daily., Disp: 90 tablet, Rfl: 3    mirtazapine (REMERON) 15 MG tablet, Take 1 tablet by mouth every night at bedtime., Disp: 90 tablet, Rfl: 1    nitroglycerin (NITROSTAT) 0.4 MG SL tablet, 1 under the tongue as needed for angina, may repeat q5mins for up three doses, Disp: 100 tablet, Rfl: 11    O2 (OXYGEN), 2 L/min., Disp: , Rfl:     Omega-3 Fatty Acids (FISH OIL) 1000 MG capsule capsule, Take 2 capsules by mouth Daily., Disp: , Rfl:     ranolazine (RANEXA) 1000 MG 12 hr tablet, Take 1 tablet by mouth 2 (Two) Times a Day., Disp: 180 tablet, Rfl: 3    vitamin B-12 (CYANOCOBALAMIN) 1000 MCG tablet, Take 1 tablet by mouth 1 (One) Time Per Week. Once a week, Disp: , Rfl:     Zinc 50 MG tablet, , Disp: , Rfl:     Allergies:   Allergies   Allergen Reactions    Ceclor [Cefaclor]     Cephalosporins Unknown - High Severity    Codeine     Diltiazem Swelling    Olodaterol Unknown - High Severity    Penicillins     Statins     Tiotropium Unknown - High Severity         Physical Exam:  Vital Signs:   Vitals:    10/01/24 1052   BP: 128/84   BP Location: Left arm   Patient Position: Sitting   Cuff Size: Adult   Pulse: 72   Resp: 16   SpO2: 95%   Weight: 39.4 kg (86 lb 14.4 oz)   Height: 149.9 cm (59.02\")     Body mass index " is 17.54 kg/m².     Physical Exam  Vitals and nursing note reviewed.   Constitutional:       Appearance: Normal appearance. She is not ill-appearing.   HENT:      Head: Normocephalic and atraumatic.      Right Ear: Tympanic membrane, ear canal and external ear normal.      Left Ear: Tympanic membrane, ear canal and external ear normal.      Nose: Nose normal.      Mouth/Throat:      Mouth: Mucous membranes are dry.      Pharynx: Oropharynx is clear.   Eyes:      Extraocular Movements: Extraocular movements intact.      Conjunctiva/sclera: Conjunctivae normal.      Pupils: Pupils are equal, round, and reactive to light.   Cardiovascular:      Rate and Rhythm: Normal rate and regular rhythm.      Pulses: Normal pulses.      Heart sounds: Normal heart sounds.   Pulmonary:      Effort: Pulmonary effort is normal.      Breath sounds: Normal breath sounds.   Musculoskeletal:      Cervical back: Normal range of motion and neck supple.   Feet:      Comments:      Neurological:      Mental Status: She is alert.         Procedures      Assessment/Plan:   Diagnoses and all orders for this visit:    1. Primary hypertension (Primary)  Assessment & Plan:  Discussed with patient to monitor their blood pressure and if systolic blood pressure goes above 140 or diastolic is above 90 to return to clinic.  Take medicines as directed, call for any problems, patient not having or any worrisome symptoms.        Orders:  -     Comprehensive Metabolic Panel; Future  -     CBC & Differential; Future  -     Vitamin B12; Future  -     Vitamin D,25-Hydroxy; Future  -     TSH Rfx On Abnormal To Free T4; Future  -     Hemoglobin A1c; Future    2. Mixed hyperlipidemia  Assessment & Plan:  Blood work in 3 months    Orders:  -     Comprehensive Metabolic Panel; Future  -     CBC & Differential; Future  -     Vitamin B12; Future  -     Vitamin D,25-Hydroxy; Future  -     TSH Rfx On Abnormal To Free T4; Future  -     Hemoglobin A1c; Future    3.  Hyperglycemia  Assessment & Plan:  A1c today    Orders:  -     Comprehensive Metabolic Panel; Future  -     CBC & Differential; Future  -     Vitamin B12; Future  -     Vitamin D,25-Hydroxy; Future  -     TSH Rfx On Abnormal To Free T4; Future  -     Hemoglobin A1c; Future    4. Loss of weight  Assessment & Plan:  We extensively reviewed her diet.  She is going to try and increase caloric intake.  Recheck in 3 months.    Orders:  -     Comprehensive Metabolic Panel; Future  -     CBC & Differential; Future  -     Vitamin B12; Future  -     Vitamin D,25-Hydroxy; Future  -     TSH Rfx On Abnormal To Free T4; Future  -     Hemoglobin A1c; Future    5. Low vitamin B12 level  Assessment & Plan:  Blood work    Orders:  -     Comprehensive Metabolic Panel; Future  -     CBC & Differential; Future  -     Vitamin B12; Future  -     Vitamin D,25-Hydroxy; Future  -     TSH Rfx On Abnormal To Free T4; Future  -     Hemoglobin A1c; Future    6. Vitamin D deficiency  Assessment & Plan:  Blood work    Orders:  -     Comprehensive Metabolic Panel; Future  -     CBC & Differential; Future  -     Vitamin B12; Future  -     Vitamin D,25-Hydroxy; Future  -     TSH Rfx On Abnormal To Free T4; Future  -     Hemoglobin A1c; Future    7. Encounter for screening mammogram for malignant neoplasm of breast  -     Mammo Screening Bilateral With CAD; Future  -     Comprehensive Metabolic Panel; Future  -     CBC & Differential; Future  -     Vitamin B12; Future  -     Vitamin D,25-Hydroxy; Future  -     TSH Rfx On Abnormal To Free T4; Future  -     Hemoglobin A1c; Future    8. Post-menopausal osteoporosis  -     DEXA Bone Density Axial  -     Comprehensive Metabolic Panel; Future  -     CBC & Differential; Future  -     Vitamin B12; Future  -     Vitamin D,25-Hydroxy; Future  -     TSH Rfx On Abnormal To Free T4; Future  -     Hemoglobin A1c; Future    9. Centrilobular emphysema  Assessment & Plan:  Patient is oxygen dependent.  She has a has  all her immunizations.  We will follow.    Orders:  -     Comprehensive Metabolic Panel; Future  -     CBC & Differential; Future  -     Vitamin B12; Future  -     Vitamin D,25-Hydroxy; Future  -     TSH Rfx On Abnormal To Free T4; Future  -     Hemoglobin A1c; Future             Follow Up:   Return in about 3 months (around 1/1/2025) for Annual physical.      Wero Daley MD  [facility]     Answers submitted by the patient for this visit:  Other (Submitted on 9/24/2024)  Please describe your symptoms.: Follow up visit  Have you had these symptoms before?: Yes  How long have you been having these symptoms?: Greater than 2 weeks  Please list any medications you are currently taking for this condition.: Duo Neb nebulizer inhalation meds &, Albuterol Inhaler  Please describe any probable cause for these symptoms. : COPD  Primary Reason for Visit (Submitted on 9/24/2024)  What is the primary reason for your visit?: Problem Not Listed

## 2024-10-02 LAB
25(OH)D3+25(OH)D2 SERPL-MCNC: 64.7 NG/ML (ref 30–100)
ALBUMIN SERPL-MCNC: 4.3 G/DL (ref 3.8–4.8)
ALP SERPL-CCNC: 81 IU/L (ref 44–121)
ALT SERPL-CCNC: 12 IU/L (ref 0–32)
AST SERPL-CCNC: 17 IU/L (ref 0–40)
BASOPHILS # BLD AUTO: 0 X10E3/UL (ref 0–0.2)
BASOPHILS NFR BLD AUTO: 0 %
BILIRUB SERPL-MCNC: 0.4 MG/DL (ref 0–1.2)
BUN SERPL-MCNC: 18 MG/DL (ref 8–27)
BUN/CREAT SERPL: 27 (ref 12–28)
CALCIUM SERPL-MCNC: 9.3 MG/DL (ref 8.7–10.3)
CHLORIDE SERPL-SCNC: 98 MMOL/L (ref 96–106)
CO2 SERPL-SCNC: 26 MMOL/L (ref 20–29)
CREAT SERPL-MCNC: 0.66 MG/DL (ref 0.57–1)
EGFRCR SERPLBLD CKD-EPI 2021: 91 ML/MIN/1.73
EOSINOPHIL # BLD AUTO: 0.1 X10E3/UL (ref 0–0.4)
EOSINOPHIL NFR BLD AUTO: 2 %
ERYTHROCYTE [DISTWIDTH] IN BLOOD BY AUTOMATED COUNT: 11.3 % (ref 11.7–15.4)
GLOBULIN SER CALC-MCNC: 2.2 G/DL (ref 1.5–4.5)
GLUCOSE SERPL-MCNC: 99 MG/DL (ref 70–99)
HBA1C MFR BLD: 5.3 % (ref 4.8–5.6)
HCT VFR BLD AUTO: 42.4 % (ref 34–46.6)
HGB BLD-MCNC: 14 G/DL (ref 11.1–15.9)
IMM GRANULOCYTES # BLD AUTO: 0 X10E3/UL (ref 0–0.1)
IMM GRANULOCYTES NFR BLD AUTO: 1 %
LYMPHOCYTES # BLD AUTO: 1.1 X10E3/UL (ref 0.7–3.1)
LYMPHOCYTES NFR BLD AUTO: 16 %
MCH RBC QN AUTO: 32 PG (ref 26.6–33)
MCHC RBC AUTO-ENTMCNC: 33 G/DL (ref 31.5–35.7)
MCV RBC AUTO: 97 FL (ref 79–97)
MONOCYTES # BLD AUTO: 0.5 X10E3/UL (ref 0.1–0.9)
MONOCYTES NFR BLD AUTO: 8 %
NEUTROPHILS # BLD AUTO: 5.1 X10E3/UL (ref 1.4–7)
NEUTROPHILS NFR BLD AUTO: 73 %
PLATELET # BLD AUTO: 238 X10E3/UL (ref 150–450)
POTASSIUM SERPL-SCNC: 4.4 MMOL/L (ref 3.5–5.2)
PROT SERPL-MCNC: 6.5 G/DL (ref 6–8.5)
RBC # BLD AUTO: 4.37 X10E6/UL (ref 3.77–5.28)
SODIUM SERPL-SCNC: 140 MMOL/L (ref 134–144)
TSH SERPL DL<=0.005 MIU/L-ACNC: 0.62 UIU/ML (ref 0.45–4.5)
VIT B12 SERPL-MCNC: 780 PG/ML (ref 232–1245)
WBC # BLD AUTO: 6.9 X10E3/UL (ref 3.4–10.8)

## 2024-10-14 ENCOUNTER — TELEPHONE (OUTPATIENT)
Dept: FAMILY MEDICINE CLINIC | Facility: CLINIC | Age: 74
End: 2024-10-14
Payer: MEDICARE

## 2024-10-14 DIAGNOSIS — R06.2 WHEEZING: ICD-10-CM

## 2024-10-14 DIAGNOSIS — Z87.891 HISTORY OF TOBACCO USE DISORDER: Primary | ICD-10-CM

## 2024-10-14 RX ORDER — ALBUTEROL SULFATE 90 UG/1
2 INHALANT RESPIRATORY (INHALATION)
Qty: 8.5 G | Refills: 2 | Status: SHIPPED | OUTPATIENT
Start: 2024-10-14

## 2024-10-14 RX ORDER — ALBUTEROL SULFATE 90 UG/1
2 INHALANT RESPIRATORY (INHALATION)
Qty: 8.5 G | Refills: 5 | Status: SHIPPED | OUTPATIENT
Start: 2024-10-14 | End: 2024-10-14 | Stop reason: SDUPTHER

## 2024-10-14 NOTE — TELEPHONE ENCOUNTER
PT HAD A REFERRAL PUT IN FOR A LUNG CANCER SCREENING, AND PT HAD TO CANCEL SHE IS READY TO RESCHEDULE SO SHE IS NEEDING A NEW REFERRAL PUT IN.

## 2024-10-14 NOTE — TELEPHONE ENCOUNTER
rmer          Current packs/day: 0.00       Average packs/day: 1 pack/day for 46.1 years (46.1 ttl pk-yrs)       Types: Cigarettes       Start date: 1968       Quit date: 2/2/2014       Years since quitting: 10.6    Smokeless tobacco: Emelina

## 2024-11-02 DIAGNOSIS — J43.9 PULMONARY EMPHYSEMA, UNSPECIFIED EMPHYSEMA TYPE: ICD-10-CM

## 2024-11-02 DIAGNOSIS — F41.9 ANXIETY: ICD-10-CM

## 2024-11-04 RX ORDER — ALPRAZOLAM 0.25 MG/1
0.25 TABLET ORAL 3 TIMES DAILY PRN
Qty: 90 TABLET | Refills: 2 | Status: SHIPPED | OUTPATIENT
Start: 2024-11-04 | End: 2024-11-11 | Stop reason: SDUPTHER

## 2024-11-04 NOTE — TELEPHONE ENCOUNTER
Rx Refill Note    Requested Prescriptions     Pending Prescriptions Disp Refills    ALPRAZolam (XANAX) 0.25 MG tablet [Pharmacy Med Name: alprazolam 0.25 mg tablet] 90 tablet 0     Sig: TAKE 1 TABLET BY MOUTH 3 TIMES A DAY AS NEEDED FOR ANXIETY.        Last office visit with prescribing clinician: 10/1/2024      Next office visit with prescribing clinician: 1/3/2025   Last labs:   Last refill: 08/05/2024   Pharmacy (be sure to add in Epic). correct

## 2024-11-11 ENCOUNTER — OFFICE VISIT (OUTPATIENT)
Dept: CARDIOLOGY | Facility: CLINIC | Age: 74
End: 2024-11-11
Payer: MEDICARE

## 2024-11-11 VITALS
HEART RATE: 88 BPM | WEIGHT: 85.2 LBS | OXYGEN SATURATION: 99 % | RESPIRATION RATE: 18 BRPM | DIASTOLIC BLOOD PRESSURE: 86 MMHG | HEIGHT: 59 IN | BODY MASS INDEX: 17.18 KG/M2 | SYSTOLIC BLOOD PRESSURE: 126 MMHG

## 2024-11-11 DIAGNOSIS — I10 PRIMARY HYPERTENSION: ICD-10-CM

## 2024-11-11 DIAGNOSIS — F41.9 ANXIETY: ICD-10-CM

## 2024-11-11 DIAGNOSIS — E78.2 MIXED HYPERLIPIDEMIA: ICD-10-CM

## 2024-11-11 DIAGNOSIS — J43.9 PULMONARY EMPHYSEMA, UNSPECIFIED EMPHYSEMA TYPE: ICD-10-CM

## 2024-11-11 DIAGNOSIS — I25.112 CORONARY ARTERY DISEASE INVOLVING NATIVE CORONARY ARTERY OF NATIVE HEART WITH REFRACTORY ANGINA PECTORIS: Primary | ICD-10-CM

## 2024-11-11 PROCEDURE — 99214 OFFICE O/P EST MOD 30 MIN: CPT | Performed by: INTERNAL MEDICINE

## 2024-11-11 PROCEDURE — 3079F DIAST BP 80-89 MM HG: CPT | Performed by: INTERNAL MEDICINE

## 2024-11-11 PROCEDURE — 1159F MED LIST DOCD IN RCRD: CPT | Performed by: INTERNAL MEDICINE

## 2024-11-11 PROCEDURE — 1160F RVW MEDS BY RX/DR IN RCRD: CPT | Performed by: INTERNAL MEDICINE

## 2024-11-11 PROCEDURE — 3074F SYST BP LT 130 MM HG: CPT | Performed by: INTERNAL MEDICINE

## 2024-11-11 RX ORDER — ALPRAZOLAM 0.25 MG/1
0.25 TABLET ORAL 3 TIMES DAILY PRN
Qty: 90 TABLET | Refills: 2 | Status: SHIPPED | OUTPATIENT
Start: 2024-11-11

## 2024-11-11 NOTE — PROGRESS NOTES
[facility] [place]  [facility] CARDIOLOGY  [address] [place] [address]  Dept: [phone]  Dept Fax: [phone]    Date: 11/11/2024  Patient: Precious Parmar  YOB: 1950    Follow Up Office Visit Note    Interval Follow-up  Ms. Precious Parmar is a 74 y.o. female who is here for follow-up on Coronary Artery Disease.    Subjective   Patient doing well with no complaints.  Patient denies angina, orthopnea, PND, palpitations, lightheadedness, syncope or medications side-effects.    The following portions of the patient's history were reviewed and updated as appropriate: allergies, current medications, past family history, past medical history, past social history, past surgical history, and problem list.    Medications:   Allergies   Allergen Reactions    Ceclor [Cefaclor]     Cephalosporins Unknown - High Severity    Codeine     Diltiazem Swelling    Olodaterol Unknown - High Severity    Penicillins     Statins     Tiotropium Unknown - High Severity      Current Outpatient Medications   Medication Instructions    albuterol sulfate  (90 Base) MCG/ACT inhaler 2 puffs, Inhalation, 4 Times Daily - RT    ALPRAZolam (XANAX) 0.25 mg, Oral, 3 Times Daily PRN    aspirin 81 mg, Oral, Daily    Cholecalciferol 50 MCG (2000 UT) tablet 2 tablets, Oral, Daily    fish oil 2,000 mg, Oral, Daily    FOLIC ACID  mcg, Oral, Daily, OTC     ipratropium-albuterol (DUO-NEB) 0.5-2.5 mg/3 ml nebulizer 3 mL, Nebulization, Every 4 Hours    metoprolol succinate XL (TOPROL-XL) 25 mg, Oral, Daily    mirtazapine (REMERON) 15 mg, Oral, Every Night at Bedtime    nitroglycerin (NITROSTAT) 0.4 MG SL tablet 1 under the tongue as needed for angina, may repeat q5mins for up three doses    O2 (OXYGEN) 2 L/min.    ranolazine (RANEXA) 1,000 mg, Oral, 2 Times Daily    vitamin B-12 (CYANOCOBALAMIN) 1,000 mcg, Oral, Weekly, Once a week    Zinc 50 MG tablet No dose, route, or frequency recorded.  "      Tobacco Use: Medium Risk (11/11/2024)    Patient History     Smoking Tobacco Use: Former     Smokeless Tobacco Use: Never     Passive Exposure: Not on file        Objective  Vitals:    11/11/24 1125   BP: 126/86   Pulse: 88   Resp: 18   SpO2: 99%  Comment: 2 liters   Weight: 38.6 kg (85 lb 3.2 oz)   Height: 149.9 cm (59.02\")   PainSc: 0-No pain      Vitals:    11/11/24 1125   BP: 126/86   Pulse: 88   Resp: 18   SpO2: 99%   Weight: 38.6 kg (85 lb 3.2 oz)   Height: 149.9 cm (59.02\")          Physical Exam  Constitutional:       Appearance: Not in distress.   Neck:      Vascular: JVD normal.   Pulmonary:      Breath sounds: Decreased breath sounds present.   Cardiovascular:      Normal rate. Regular rhythm.      Murmurs: There is no murmur.   Pulses:     Intact distal pulses.   Edema:     Peripheral edema absent.   Neurological:      Mental Status: Alert.              Diagnostic Data  Lab Results   Component Value Date     10/01/2024    K 4.4 10/01/2024    CL 98 10/01/2024    CO2 26 10/01/2024    BUN 18 10/01/2024    CREATININE 0.66 10/01/2024    CALCIUM 9.3 10/01/2024    BILITOT 0.4 10/01/2024    ALKPHOS 81 10/01/2024    ALT 12 10/01/2024    AST 17 10/01/2024    GLUCOSE 99 10/01/2024    ALBUMIN 4.3 10/01/2024     Lab Results   Component Value Date    WBC 6.9 10/01/2024    HGB 14.0 10/01/2024    HCT 42.4 10/01/2024     10/01/2024     No results found for: \"APTT\", \"INR\", \"PTT\"  No results found for: \"CKTOTAL\", \"TROPONINI\", \"TROPONINT\", \"CKMBINDEX\", \"BNP\"  No results found for: \"BNP\", \"PROBNP\"    Lab Results   Component Value Date    CHLPL 183 12/22/2023    TRIG 87 12/22/2023    HDL 68 12/22/2023    LDL 99 12/22/2023     Lab Results   Component Value Date    TSH 0.622 10/01/2024    FREET4 1.36 12/09/2022       CV Diagnostics:  Procedures    CXR: No results found for this or any previous visit.     ECHO/MUGA: Results for orders placed in visit on 12/20/23    Adult Transthoracic Echo Complete W/ Cont if " Necessary Per Protocol    Interpretation Summary    Poor windows; incomplete study.    Normal LV size and function, ejection fraction estimated by 2D, 56 - 60%.    Mild to moderate mitral valve regurgitation.    Left ventricular diastolic function was not assessed.     STRESS TESTS: No results found for this or any previous visit.     CARDIAC CATH: No results found for this or any previous visit.     DEVICES: No valid procedures specified.   HOLTER: No results found for this or any previous visit.     CT/MRI:  Results for orders placed during the hospital encounter of 07/12/24    CT Angiogram Coronary    Narrative  CT ANGIOGRAM CORONARY    Date of Exam: 7/12/2024 10:15 AM EDT    Indication: . Chest pain    Comparison: None available.    Technique: Non-contrasted CT images of the chest were performed for calcium scoring purposes followed by CTA images of the chest after the uneventful intravenous administration of contrast . Reconstructed coronal and sagittal images were also obtained.  In addition, a 3-D volume rendered image was created for interpretation. Automated exposure control and iterative reconstruction methods were used.      Findings:  Aorta is normal in caliber. The great vessels are unremarkable. Pulmonary artery is normal in caliber. Left atrium is unremarkable. No adenopathy is identified. Esophagus is unremarkable. Status post left thyroidectomy. Several right thyroid nodules are  identified. Limited views of the upper abdomen are unremarkable. Severe changes of emphysema. There is nodular area of scarring within the left upper lobe measuring 1.4 cm in diameter. No other suspicious pulmonary nodules. No aggressive appearing lytic  or sclerotic bone lesions are identified.    Impression  Impression:    1. Nodular area of scarring in the left lung apex measuring 1.4 cm. Recommend correlation with prior outside imaging for stability. If there are no priors, recommend a 6-month follow-up for  stability.    Please refer to CT Angiogram Coronary   Cardiology Interp report for information on cardiac structures.    Electronically Signed: Steven Elizalde MD  7/15/2024 1:12 PM EDT  Workstation ID: [id number]    VASCULAR: No valid procedures specified.     Assessment and Plan  Diagnoses and all orders for this visit:    1. Coronary artery disease involving native coronary artery of native heart with refractory angina pectoris (Primary)  Assessment & Plan:  Coronary artery disease:  November 2006: [facility] with PTCA for septal  2.5 x 8 Cypher to ostium of LAD/RAMUS.  Normal LVEF.   April 2011[facility]:  Normal perfusion, ejection fraction preserved.  9/14 echo wnl IDD  Coronary artery disease is unchanged. CCS 1 angina, with no significant chest pains reported.  Stress test showing old myocardial infarction but no ischemia.  Echo very limited in view of poor windows, so unable to detect regional motion modalities there, repeat with contrast showing no regional motion normalities, possibly related to severe ischemia versus small infarction not seen on echo.  Coronary CTA negative for severe CAD except at the site of the prior stent, that was not seen very well.  Severe emphysema complicating the presentation.  High risk for cardiac catheterization in view of weight loss/cachexia/poor appetite and severe emphysema.  Continue metoprolol succinate 25 mg 1 tab daily, aspirin 81 mg p.o. daily, ranolazine to 1000 mg p.o. twice daily.  Consider low-dose nondihydropyridine calcium channel blockers next visit for rate control, chest pain and blood pressure control.  LDL to goal so deferring ezetimibe for now.  Clinical follow-up.      2. Primary hypertension  Assessment & Plan:  Hypertension is stable and controlled  Continue current treatment regimen.  Ambulatory blood pressure monitoring.  Blood pressure will be reassessed in 6 months.      3. Mixed hyperlipidemia         Return in about 6 months (around 5/11/2025)  for Follow-up with Dr Heard.    There are no Patient Instructions on file for this visit.    Kenn Heard MD

## 2024-11-11 NOTE — ASSESSMENT & PLAN NOTE
Hypertension is stable and controlled  Continue current treatment regimen.  Ambulatory blood pressure monitoring.  Blood pressure will be reassessed in 6 months.

## 2024-11-11 NOTE — ASSESSMENT & PLAN NOTE
Lipid abnormalities are improving with treatment    Plan:  Continue same medication/s without change.      Counseled patient on lifestyle modifications to help control hyperlipidemia.     Lab Results   Component Value Date    LDL 99 12/22/2023    LDL 79 06/21/2023    HDL 68 12/22/2023    HDL 76 06/21/2023    CHLPL 183 12/22/2023    CHLPL 168 06/21/2023    TRIG 87 12/22/2023    TRIG 69 06/21/2023       Patient Treatment Goals:   LDL goal is less than 70; awaiting repeat lipid profile from this year.    Followup in 6 months.

## 2024-11-11 NOTE — ASSESSMENT & PLAN NOTE
Coronary artery disease:  November 2006: [facility] with PTCA for septal  2.5 x 8 Cypher to ostium of LAD/RAMUS.  Normal LVEF.   April 2011: [facility] per [facility]:  Normal perfusion, ejection fraction preserved.  9/14 echo wnl IDD  Coronary artery disease is unchanged. CCS 1 angina, with no significant chest pains reported.  Stress test showing old myocardial infarction but no ischemia.  Echo very limited in view of poor windows, so unable to detect regional motion modalities there, repeat with contrast showing no regional motion normalities, possibly related to severe ischemia versus small infarction not seen on echo.  Coronary CTA negative for severe CAD except at the site of the prior stent, that was not seen very well.  Severe emphysema complicating the presentation.  High risk for cardiac catheterization in view of weight loss/cachexia/poor appetite and severe emphysema.  Continue metoprolol succinate 25 mg 1 tab daily, aspirin 81 mg p.o. daily, ranolazine to 1000 mg p.o. twice daily.  Consider low-dose nondihydropyridine calcium channel blockers next visit for rate control, chest pain and blood pressure control.  LDL to goal so deferring ezetimibe for now.  Clinical follow-up.

## 2024-11-12 DIAGNOSIS — F41.9 ANXIETY: ICD-10-CM

## 2024-11-12 DIAGNOSIS — J43.9 PULMONARY EMPHYSEMA, UNSPECIFIED EMPHYSEMA TYPE: ICD-10-CM

## 2024-11-14 ENCOUNTER — OFFICE VISIT (OUTPATIENT)
Dept: FAMILY MEDICINE CLINIC | Facility: CLINIC | Age: 74
End: 2024-11-14
Payer: MEDICARE

## 2024-11-14 VITALS
SYSTOLIC BLOOD PRESSURE: 128 MMHG | HEIGHT: 59 IN | WEIGHT: 85 LBS | BODY MASS INDEX: 17.14 KG/M2 | DIASTOLIC BLOOD PRESSURE: 82 MMHG

## 2024-11-14 DIAGNOSIS — H66.002 NON-RECURRENT ACUTE SUPPURATIVE OTITIS MEDIA OF LEFT EAR WITHOUT SPONTANEOUS RUPTURE OF TYMPANIC MEMBRANE: Primary | ICD-10-CM

## 2024-11-14 DIAGNOSIS — H61.92 LESION OF LEFT EXTERNAL EAR: ICD-10-CM

## 2024-11-14 PROCEDURE — 3074F SYST BP LT 130 MM HG: CPT

## 2024-11-14 PROCEDURE — 1160F RVW MEDS BY RX/DR IN RCRD: CPT

## 2024-11-14 PROCEDURE — 3079F DIAST BP 80-89 MM HG: CPT

## 2024-11-14 PROCEDURE — 1126F AMNT PAIN NOTED NONE PRSNT: CPT

## 2024-11-14 PROCEDURE — 99213 OFFICE O/P EST LOW 20 MIN: CPT

## 2024-11-14 PROCEDURE — 1159F MED LIST DOCD IN RCRD: CPT

## 2024-11-14 RX ORDER — DOXYCYCLINE 100 MG/1
100 TABLET ORAL 2 TIMES DAILY
Qty: 14 TABLET | Refills: 0 | Status: SHIPPED | OUTPATIENT
Start: 2024-11-14 | End: 2024-11-21

## 2024-11-14 NOTE — PROGRESS NOTES
"Chief Complaint  Ear Fullness (Patient ear feees full )    Subjective          Precious Parmar presents to [facility]  History of Present Illness  Patient presents to the office today with accompanying female family member with concerns for ear fullness.  She has been dealing with this since October 9.  She went to [facility] urgent care and was prescribed Cipro drops and mupirocin ointment as she has a patch along the external ear.  She states that she thought it was initially fluid from her ear that had dried so she went to scratch it and this was very painful for her.  She reports she has seen Dr. Don in the past and was told that she has hearing loss.  She states today she feels like her head is underwater and she has a continuously runny nose.  She states she did not use her Flonase nasal spray today but this does help some with her symptoms.  She states initially her symptoms eased up but now it is worse.  She presents today on 2 L of oxygen via nasal cannula which is her baseline.  Answers submitted by the patient for this visit:  Other (Submitted on 11/13/2024)  Please describe your symptoms.: Scale in ear after ear infection. No pain, no fever, fluids clear but the scaly patch will not wipe away. Allergies really bad. Nose continually running & feels like my head is under water.  Have you had these symptoms before?: No  How long have you been having these symptoms?: Greater than 2 weeks  Primary Reason for Visit (Submitted on 11/13/2024)  What is the primary reason for your visit?: Problem Not Listed    Objective   Vital Signs:   /82 (BP Location: Right arm, Patient Position: Sitting, Cuff Size: Adult)   Ht 149.9 cm (59\")   Wt 38.6 kg (85 lb)   BMI 17.17 kg/m²     Body mass index is 17.17 kg/m².    Review of Systems   Constitutional:  Positive for fatigue.   HENT:  Positive for ear pain, postnasal drip and rhinorrhea.    Respiratory:  Negative for shortness of breath.  "   Cardiovascular:  Negative for chest pain.       Past History:  Medical History: has a past medical history of Age-related osteoporosis without current pathological fracture, Allergic, Allergic rhinitis, Anxiety, AR (allergic rhinitis), Asthma, Cataract, Chronic constipation, Chronic constipation, Chronic granulomatous disease, CKD (chronic kidney disease), stage III, COLD (chronic obstructive lung disease), COPD (chronic obstructive pulmonary disease), Coronary arteriosclerosis in native artery, Coronary artery disease, Diverticular disease of colon, Diverticulosis, Dyslipidemia, Emphysema lung, Encounter for immunization (04/18/2018), Endometriosis, High risk medication use, History of chest x-ray (09/22/2014), History of chest x-ray (03/22/2014), History of chest x-ray (03/19/2014), History of echocardiogram (09/29/2014), History of PFTs (11/20/2015), History of PFTs (06/15/2015), History of PFTs (11/20/2014), HL (hearing loss), Hyperlipidemia, Hypertension, Hypothyroidism, Hypoxemia requiring supplemental oxygen, Kidney stone, Muscle pain, Myalgia, Nephrolithiasis, Non-toxic multinodular goiter (03/2017), Osteopenia, Osteoporosis, Panacinar emphysema, Renal mass, Senile osteoporosis, Statin intolerance, Tobacco use disorder, Vitamin B12 deficiency, and Vitamin D deficiency.   Surgical History: has a past surgical history that includes Coronary angioplasty with stent; Rotator cuff repair (2000); Tonsillectomy; Hysterectomy; Oophorectomy; Cardiac catheterization; Appendectomy (Oct 1970); and Coronary stent placement (Nov 2006).   Family History: family history includes Arthritis in her father and mother; Breast cancer in her paternal aunt; Cancer in her brother, father, and another family member; Heart attack in her mother; Heart disease in her mother; Hypertension in her brother, brother, and mother; Kidney disease in her mother; Stroke in her mother; Vision loss in her father and mother.   Social History:  reports that she quit smoking about 10 years ago. Her smoking use included cigarettes. She started smoking about 56 years ago. She has a 46.1 pack-year smoking history. She has never used smokeless tobacco. She reports that she does not drink alcohol and does not use drugs.      Current Outpatient Medications:     albuterol sulfate  (90 Base) MCG/ACT inhaler, Inhale 2 puffs 4 (Four) Times a Day., Disp: 8.5 g, Rfl: 2    ALPRAZolam (XANAX) 0.25 MG tablet, Take 1 tablet by mouth 3 (Three) Times a Day As Needed for Anxiety., Disp: 90 tablet, Rfl: 2    aspirin 81 MG EC tablet, Take 1 tablet by mouth Daily., Disp: 90 tablet, Rfl: 3    Cholecalciferol 50 MCG (2000 UT) tablet, Take 2 tablets by mouth Daily., Disp: , Rfl:     FOLIC ACID PO, Take 800 mcg by mouth Daily. OTC, Disp: , Rfl:     ipratropium-albuterol (DUO-NEB) 0.5-2.5 mg/3 ml nebulizer, Take 3 mL by nebulization Every 4 (Four) Hours., Disp: 360 mL, Rfl: 1    metoprolol succinate XL (TOPROL-XL) 25 MG 24 hr tablet, Take 1 tablet by mouth Daily., Disp: 90 tablet, Rfl: 3    mirtazapine (REMERON) 15 MG tablet, Take 1 tablet by mouth every night at bedtime., Disp: 90 tablet, Rfl: 1    nitroglycerin (NITROSTAT) 0.4 MG SL tablet, 1 under the tongue as needed for angina, may repeat q5mins for up three doses, Disp: 100 tablet, Rfl: 11    O2 (OXYGEN), 2 L/min., Disp: , Rfl:     Omega-3 Fatty Acids (FISH OIL) 1000 MG capsule capsule, Take 2 capsules by mouth Daily., Disp: , Rfl:     ranolazine (RANEXA) 1000 MG 12 hr tablet, Take 1 tablet by mouth 2 (Two) Times a Day., Disp: 180 tablet, Rfl: 3    vitamin B-12 (CYANOCOBALAMIN) 1000 MCG tablet, Take 1 tablet by mouth 1 (One) Time Per Week. Once a week, Disp: , Rfl:     Zinc 50 MG tablet, , Disp: , Rfl:     doxycycline (ADOXA) 100 MG tablet, Take 1 tablet by mouth 2 (Two) Times a Day for 7 days., Disp: 14 tablet, Rfl: 0    Allergies: Ceclor [cefaclor], Cephalosporins, Codeine, Diltiazem, Olodaterol, Penicillins, Statins,  and Tiotropium    Physical Exam  Vitals and nursing note reviewed.   Constitutional:       General: She is not in acute distress.     Appearance: She is not ill-appearing or toxic-appearing.   HENT:      Head: Normocephalic and atraumatic.      Right Ear: Decreased hearing noted. There is no impacted cerumen. Tympanic membrane is erythematous.      Left Ear: Decreased hearing noted. A middle ear effusion is present. There is no impacted cerumen. Tympanic membrane is erythematous and bulging.      Ears:        Comments: There is a erythematous plaque present that resembles that of eczema or seborrheic dermatitis.  Cardiovascular:      Rate and Rhythm: Normal rate and regular rhythm.      Pulses: Normal pulses.      Heart sounds: Normal heart sounds. No murmur heard.  Pulmonary:      Effort: Pulmonary effort is normal. No respiratory distress.      Breath sounds: Normal breath sounds. No wheezing, rhonchi or rales.      Comments: 2 L of oxygen via nasal cannula  Skin:     General: Skin is warm.   Neurological:      General: No focal deficit present.      Mental Status: She is alert and oriented to person, place, and time. Mental status is at baseline.   Psychiatric:         Mood and Affect: Mood normal.         Behavior: Behavior normal.         Thought Content: Thought content normal.         Judgment: Judgment normal.          Result Review :                   Assessment and Plan    Assessment & Plan  Non-recurrent acute suppurative otitis media of left ear without spontaneous rupture of tympanic membrane  Due to patient's allergy list, will do doxycycline 100 mg 1 tablet twice a day for a week.  We discussed side effects of medication.  Advised ultimately this medication is best absorbed on an empty stomach, however if she has difficulty with this she can take with food.  Discussed protecting her skin in the sun as this can cause some sensitivity.  We discussed taking this medication apart from her vitamins and  supplements.  Recommend taking full course of antibiotics as prescribed.  Recommend that she continue use of her Flonase nasal spray and loratadine.  We did discuss adding in Mucinex for her symptoms.  Advised that if her symptoms do not improve with antibiotic therapy that we may need to refer back to ENT.  She is understanding of this.  Orders:    doxycycline (ADOXA) 100 MG tablet; Take 1 tablet by mouth 2 (Two) Times a Day for 7 days.    Lesion of left external ear  Discussed that she can continue to use the mupirocin ointment that was provided by [facility] and apply this to the lesion of her left ear.  Discussed this does resemble that of eczema or seborrheic dermatitis.  Recommend keeping area clean and dry.       She has a follow-up with her PCP on 1/3/2025.  Patient is agreeable to plan of care and verbalized understanding of plan of care.    Follow Up   Return for Next scheduled follow up.  Patient was given instructions and counseling regarding her condition or for health maintenance advice. Please see specific information pulled into the AVS if appropriate.     APARNA Dowell

## 2024-11-20 ENCOUNTER — TELEPHONE (OUTPATIENT)
Dept: FAMILY MEDICINE CLINIC | Facility: CLINIC | Age: 74
End: 2024-11-20
Payer: MEDICARE

## 2024-11-20 NOTE — TELEPHONE ENCOUNTER
Caller: Precious Parmar     Relationship: PATIENT    Best call back number: [phone]     What is your medical concern? CHRONIC EAR INFECTION WITH SCARY PATCH OF SKIN.    How long has this issue been going on? WEEKS.    Is your provider already aware of this issue? YES    Have you been treated for this issue? YES. BUT HAD TO STOP ANTIBIOTIC BECAUSE IT CAUSED INDIGESTION IN MORNING. EVENING DOSE CAUSED VOMITING.    PLEASE SEE PATIENT MYCHART DR ALONSO NOTE RE: PATIENT SCHEDULING APPOINTMENT FOR THIS.   CAN YOU WORK HER IN PLEASE? SHE ONLY WANTS TO SEE HIM.    THANK YOU

## 2024-11-21 ENCOUNTER — OFFICE VISIT (OUTPATIENT)
Dept: FAMILY MEDICINE CLINIC | Facility: CLINIC | Age: 74
End: 2024-11-21
Payer: MEDICARE

## 2024-11-21 VITALS
WEIGHT: 85.5 LBS | SYSTOLIC BLOOD PRESSURE: 132 MMHG | HEIGHT: 59 IN | DIASTOLIC BLOOD PRESSURE: 88 MMHG | OXYGEN SATURATION: 90 % | HEART RATE: 91 BPM | BODY MASS INDEX: 17.24 KG/M2 | TEMPERATURE: 98.1 F

## 2024-11-21 DIAGNOSIS — J30.2 SEASONAL ALLERGIC RHINITIS, UNSPECIFIED TRIGGER: Primary | ICD-10-CM

## 2024-11-21 PROCEDURE — 3075F SYST BP GE 130 - 139MM HG: CPT | Performed by: PHYSICIAN ASSISTANT

## 2024-11-21 PROCEDURE — 99213 OFFICE O/P EST LOW 20 MIN: CPT | Performed by: PHYSICIAN ASSISTANT

## 2024-11-21 PROCEDURE — 1126F AMNT PAIN NOTED NONE PRSNT: CPT | Performed by: PHYSICIAN ASSISTANT

## 2024-11-21 PROCEDURE — 3079F DIAST BP 80-89 MM HG: CPT | Performed by: PHYSICIAN ASSISTANT

## 2024-11-21 RX ORDER — PREDNISONE 10 MG/1
TABLET ORAL
Qty: 30 TABLET | Refills: 0 | Status: SHIPPED | OUTPATIENT
Start: 2024-11-21 | End: 2024-12-03

## 2024-11-21 NOTE — PROGRESS NOTES
Patient Office Visit      Patient Name: Precious Parmar  : 1950   MRN: [id number]     Chief Complaint:    Chief Complaint   Patient presents with    Ear Fullness     X1M. Pt saw Andra 2024 and was prescribed Doxy but was unable to take due to it being so hard on her stomach. Pt states she was also instructed to take her Loratadine and Flonase but they dried her up too bad so she stopped those as well.       History of Present Illness: Precious Parmar is a 74 y.o. female who is here today for persistent fullness left ear.  She complains that she got too dried up when using Flonase with generic Claritin.  She took doxycycline for several days but stopped early because her stomach was upset.  She really does not think she had an ear infection as she had no pain or fever but she has fullness in the left ear with hearing loss.    Subjective      Review of Systems:         Past Medical History:   Past Medical History:   Diagnosis Date    Age-related osteoporosis without current pathological fracture     Allergic     Allergic rhinitis     Anxiety     AR (allergic rhinitis)     Asthma     Cataract     Chronic constipation     Chronic constipation     Chronic granulomatous disease     CKD (chronic kidney disease), stage III     COLD (chronic obstructive lung disease)     COPD (chronic obstructive pulmonary disease)     Coronary arteriosclerosis in native artery     Coronary artery disease     Diverticular disease of colon     Diverticulosis     SEVERE    Dyslipidemia     Emphysema lung     Encounter for immunization 2018    Endometriosis     High risk medication use     History of chest x-ray 2014    chronic change, no active disease     History of chest x-ray 2014    no acute change; findings consistent with COPD     History of chest x-ray 2014    no evidence of acute cardiopulmonary disease     History of echocardiogram 2014    EF 50-55%; E to A reversal in MV flow pattern  suggestive of diastolic dysfunction    History of PFTs 11/20/2015    FVC 2.29 L 89%, FEV1 0.67 L, 34%, ratio 29%, after Xopenex HFA FEV1 improves to 0.81 L or 42%    History of PFTs 06/15/2015    data is acceptable and reproducible; pt given 4 puffs of albuterol; pt gave good effort.     History of PFTs 11/20/2014    data is acceptable and reproducible; pt given 3 puffs of xopenex; pt gave good effort     HL (hearing loss)     Hyperlipidemia     MIXED    Hypertension     Hypothyroidism     Hypoxemia requiring supplemental oxygen     Kidney stone     Muscle pain     Myalgia     ARTHRALGIA    Nephrolithiasis     Non-toxic multinodular goiter 03/2017    NORMAL THYROID FUNCTION    Osteopenia     Osteoporosis     Panacinar emphysema     Renal mass     L/LOWER    Senile osteoporosis     Statin intolerance     Tobacco use disorder     QUIT 2014    Vitamin B12 deficiency     Vitamin D deficiency        Past Surgical History:   Past Surgical History:   Procedure Laterality Date    APPENDECTOMY  Oct 1970    Removed with hysterectomy    CARDIAC CATHETERIZATION      CORONARY ANGIOPLASTY WITH STENT PLACEMENT      Status post diagnostic left heart catheterization, selective coronary angiography, left ventriculography, and stenting on 11/29/06, performed by Dr. Emir Lindquist.HEATH.    CORONARY STENT PLACEMENT  Nov 2006    HYSTERECTOMY      Age 20    OOPHORECTOMY      Age 20    ROTATOR CUFF REPAIR  2000    TONSILLECTOMY         Family History:   Family History   Problem Relation Age of Onset    Heart attack Mother     Stroke Mother     Hypertension Mother     Arthritis Mother     Heart disease Mother     Kidney disease Mother     Vision loss Mother     Arthritis Father     Cancer Father         Prostate cancer    Vision loss Father     Cancer Brother         Lung cancer    Hypertension Brother     Hypertension Brother     Breast cancer Paternal Aunt         age unknown     Cancer Other     Ovarian cancer Neg Hx        Social History:  "  Social History     Socioeconomic History    Marital status:    Tobacco Use    Smoking status: Former     Current packs/day: 0.00     Average packs/day: 1 pack/day for 46.1 years (46.1 ttl pk-yrs)     Types: Cigarettes     Start date: 1/1/1968     Quit date: 2/2/2014     Years since quitting: 10.8     Passive exposure: Past    Smokeless tobacco: Never   Vaping Use    Vaping status: Never Used   Substance and Sexual Activity    Alcohol use: Not Currently    Drug use: Never    Sexual activity: Not Currently     Partners: Male     Birth control/protection: None, Hysterectomy       Allergies:   Allergies   Allergen Reactions    Ceclor [Cefaclor]     Cephalosporins Unknown - High Severity    Codeine     Diltiazem Swelling    Olodaterol Unknown - High Severity    Penicillins     Statins     Tiotropium Unknown - High Severity       Objective     Physical Exam:  Vital Signs:   Vitals:    11/21/24 1520   BP: 132/88   BP Location: Left arm   Patient Position: Sitting   Cuff Size: Pediatric   Pulse: 91   Temp: 98.1 °F (36.7 °C)   TempSrc: Oral   SpO2: 90%   Weight: 38.8 kg (85 lb 8 oz)   Height: 149.9 cm (59\")   PainSc: 0-No pain     Body mass index is 17.27 kg/m².           Physical Exam  Constitutional:       General: She is not in acute distress.  HENT:      Right Ear: Tympanic membrane, ear canal and external ear normal.      Left Ear: Tympanic membrane, ear canal and external ear normal.      Ears:      Comments: There is some dull erythema and scaling skin at the entrance to the left ear canal.  Neurological:      Mental Status: She is alert.         Procedures    Assessment / Plan      Assessment/Plan:   Diagnoses and all orders for this visit:    1. Seasonal allergic rhinitis, unspecified trigger (Primary)  -     predniSONE (DELTASONE) 10 MG tablet; Take 4 tablets by mouth Daily for 3 days, THEN 3 tablets Daily for 3 days, THEN 2 tablets Daily for 3 days, THEN 1 tablet Daily for 3 days.  Dispense: 30 tablet; " Refill: 0    Known hearing loss, I think eustachian tube dysfunction is really exacerbating.  I recommend restart Claritin.  Offered referral to ENT.  I really think this is due to allergies or postinfectious inflammation.  Patient says symptoms are really aggravating.  We can probably get symptoms to improve faster with a round of prednisone.  Patient wants to give this a try.      Medications:     Current Outpatient Medications:     albuterol sulfate  (90 Base) MCG/ACT inhaler, Inhale 2 puffs 4 (Four) Times a Day., Disp: 8.5 g, Rfl: 2    ALPRAZolam (XANAX) 0.25 MG tablet, Take 1 tablet by mouth 3 (Three) Times a Day As Needed for Anxiety., Disp: 90 tablet, Rfl: 2    aspirin 81 MG EC tablet, Take 1 tablet by mouth Daily., Disp: 90 tablet, Rfl: 3    Cholecalciferol 50 MCG (2000 UT) tablet, Take 2 tablets by mouth Daily., Disp: , Rfl:     FOLIC ACID PO, Take 800 mcg by mouth Daily. OTC, Disp: , Rfl:     ipratropium-albuterol (DUO-NEB) 0.5-2.5 mg/3 ml nebulizer, Take 3 mL by nebulization Every 4 (Four) Hours., Disp: 360 mL, Rfl: 1    metoprolol succinate XL (TOPROL-XL) 25 MG 24 hr tablet, Take 1 tablet by mouth Daily., Disp: 90 tablet, Rfl: 3    mirtazapine (REMERON) 15 MG tablet, Take 1 tablet by mouth every night at bedtime., Disp: 90 tablet, Rfl: 1    nitroglycerin (NITROSTAT) 0.4 MG SL tablet, 1 under the tongue as needed for angina, may repeat q5mins for up three doses, Disp: 100 tablet, Rfl: 11    O2 (OXYGEN), 2 L/min., Disp: , Rfl:     Omega-3 Fatty Acids (FISH OIL) 1000 MG capsule capsule, Take 2 capsules by mouth Daily., Disp: , Rfl:     ranolazine (RANEXA) 1000 MG 12 hr tablet, Take 1 tablet by mouth 2 (Two) Times a Day., Disp: 180 tablet, Rfl: 3    vitamin B-12 (CYANOCOBALAMIN) 1000 MCG tablet, Take 1 tablet by mouth 1 (One) Time Per Week. Once a week, Disp: , Rfl:     Zinc 50 MG tablet, , Disp: , Rfl:     doxycycline (ADOXA) 100 MG tablet, Take 1 tablet by mouth 2 (Two) Times a Day for 7 days.  (Patient not taking: Reported on 11/21/2024), Disp: 14 tablet, Rfl: 0    predniSONE (DELTASONE) 10 MG tablet, Take 4 tablets by mouth Daily for 3 days, THEN 3 tablets Daily for 3 days, THEN 2 tablets Daily for 3 days, THEN 1 tablet Daily for 3 days., Disp: 30 tablet, Rfl: 0        Follow Up:   No follow-ups on file.    Shantal Davis PA-C   [facility]     NOTE TO PATIENT: The 21st Century Cures Act makes medical notes like these available to patients in the interest of transparency. However, be advised this is a medical document. It is intended as peer to peer communication. It is written in medical language and may contain abbreviations or verbiage that are unfamiliar. It may appear blunt or direct. Medical documents are intended to carry relevant information, facts as evident, and the clinical opinion of the practitioner.

## 2024-11-25 DIAGNOSIS — I25.112 CORONARY ARTERY DISEASE INVOLVING NATIVE CORONARY ARTERY OF NATIVE HEART WITH REFRACTORY ANGINA PECTORIS: ICD-10-CM

## 2024-11-25 DIAGNOSIS — F43.29 ADJUSTMENT DISORDER WITH DISTURBANCE OF EMOTION: ICD-10-CM

## 2024-11-25 RX ORDER — METOPROLOL SUCCINATE 25 MG/1
25 TABLET, EXTENDED RELEASE ORAL DAILY
Qty: 90 TABLET | Refills: 3 | Status: SHIPPED | OUTPATIENT
Start: 2024-11-25 | End: 2024-11-25 | Stop reason: SDUPTHER

## 2024-11-25 RX ORDER — METOPROLOL SUCCINATE 25 MG/1
25 TABLET, EXTENDED RELEASE ORAL DAILY
Qty: 90 TABLET | Refills: 3 | Status: SHIPPED | OUTPATIENT
Start: 2024-11-25

## 2024-11-25 RX ORDER — MIRTAZAPINE 15 MG/1
15 TABLET, FILM COATED ORAL
Qty: 90 TABLET | Refills: 0 | Status: SHIPPED | OUTPATIENT
Start: 2024-11-25

## 2024-12-09 DIAGNOSIS — F43.29 ADJUSTMENT DISORDER WITH DISTURBANCE OF EMOTION: ICD-10-CM

## 2024-12-09 DIAGNOSIS — I25.112 CORONARY ARTERY DISEASE INVOLVING NATIVE CORONARY ARTERY OF NATIVE HEART WITH REFRACTORY ANGINA PECTORIS: ICD-10-CM

## 2024-12-09 RX ORDER — MIRTAZAPINE 15 MG/1
15 TABLET, FILM COATED ORAL
Qty: 90 TABLET | Refills: 2 | Status: SHIPPED | OUTPATIENT
Start: 2024-12-09

## 2024-12-09 RX ORDER — METOPROLOL SUCCINATE 25 MG/1
25 TABLET, EXTENDED RELEASE ORAL DAILY
Qty: 90 TABLET | Refills: 1 | Status: SHIPPED | OUTPATIENT
Start: 2024-12-09

## 2024-12-09 RX ORDER — METOPROLOL SUCCINATE 25 MG/1
25 TABLET, EXTENDED RELEASE ORAL DAILY
Qty: 90 TABLET | Refills: 1 | Status: SHIPPED | OUTPATIENT
Start: 2024-12-09 | End: 2024-12-09 | Stop reason: SDUPTHER

## 2024-12-18 ENCOUNTER — TRANSCRIBE ORDERS (OUTPATIENT)
Dept: CT IMAGING | Facility: CLINIC | Age: 74
End: 2024-12-18
Payer: MEDICARE

## 2024-12-18 DIAGNOSIS — Z12.31 ENCOUNTER FOR SCREENING MAMMOGRAM FOR MALIGNANT NEOPLASM OF BREAST: Primary | ICD-10-CM

## 2024-12-29 DIAGNOSIS — R06.2 WHEEZING: ICD-10-CM

## 2024-12-30 RX ORDER — ALBUTEROL SULFATE 90 UG/1
INHALANT RESPIRATORY (INHALATION)
Qty: 8.5 G | Refills: 2 | Status: SHIPPED | OUTPATIENT
Start: 2024-12-30

## 2025-01-03 ENCOUNTER — OFFICE VISIT (OUTPATIENT)
Dept: FAMILY MEDICINE CLINIC | Facility: CLINIC | Age: 75
End: 2025-01-03
Payer: MEDICARE

## 2025-01-03 VITALS
BODY MASS INDEX: 17.09 KG/M2 | HEIGHT: 59 IN | WEIGHT: 84.8 LBS | DIASTOLIC BLOOD PRESSURE: 84 MMHG | HEART RATE: 68 BPM | OXYGEN SATURATION: 96 % | SYSTOLIC BLOOD PRESSURE: 128 MMHG

## 2025-01-03 DIAGNOSIS — H91.93 BILATERAL HEARING LOSS, UNSPECIFIED HEARING LOSS TYPE: ICD-10-CM

## 2025-01-03 DIAGNOSIS — I25.10 CORONARY ARTERY DISEASE INVOLVING NATIVE HEART, UNSPECIFIED VESSEL OR LESION TYPE, UNSPECIFIED WHETHER ANGINA PRESENT: ICD-10-CM

## 2025-01-03 DIAGNOSIS — J43.9 PULMONARY EMPHYSEMA, UNSPECIFIED EMPHYSEMA TYPE: ICD-10-CM

## 2025-01-03 DIAGNOSIS — R09.02 HYPOXIA: ICD-10-CM

## 2025-01-03 DIAGNOSIS — R63.4 LOSS OF WEIGHT: ICD-10-CM

## 2025-01-03 DIAGNOSIS — E05.90 HYPERTHYROIDISM: ICD-10-CM

## 2025-01-03 DIAGNOSIS — R79.89 LOW VITAMIN B12 LEVEL: ICD-10-CM

## 2025-01-03 DIAGNOSIS — J43.2 CENTRILOBULAR EMPHYSEMA: ICD-10-CM

## 2025-01-03 DIAGNOSIS — I10 PRIMARY HYPERTENSION: ICD-10-CM

## 2025-01-03 DIAGNOSIS — E78.2 MIXED HYPERLIPIDEMIA: ICD-10-CM

## 2025-01-03 DIAGNOSIS — Z12.11 COLON CANCER SCREENING: Primary | ICD-10-CM

## 2025-01-03 DIAGNOSIS — E55.9 VITAMIN D DEFICIENCY: ICD-10-CM

## 2025-01-03 DIAGNOSIS — F41.9 ANXIETY: ICD-10-CM

## 2025-01-03 DIAGNOSIS — E78.5 DYSLIPIDEMIA: ICD-10-CM

## 2025-01-03 DIAGNOSIS — Z87.891 HISTORY OF TOBACCO USE DISORDER: ICD-10-CM

## 2025-01-03 DIAGNOSIS — R73.9 HYPERGLYCEMIA: ICD-10-CM

## 2025-01-03 PROCEDURE — 99214 OFFICE O/P EST MOD 30 MIN: CPT | Performed by: FAMILY MEDICINE

## 2025-01-03 PROCEDURE — 3074F SYST BP LT 130 MM HG: CPT | Performed by: FAMILY MEDICINE

## 2025-01-03 PROCEDURE — 3079F DIAST BP 80-89 MM HG: CPT | Performed by: FAMILY MEDICINE

## 2025-01-03 PROCEDURE — 1126F AMNT PAIN NOTED NONE PRSNT: CPT | Performed by: FAMILY MEDICINE

## 2025-01-03 PROCEDURE — G2211 COMPLEX E/M VISIT ADD ON: HCPCS | Performed by: FAMILY MEDICINE

## 2025-01-03 RX ORDER — ALPRAZOLAM 0.25 MG/1
0.25 TABLET ORAL 3 TIMES DAILY PRN
Qty: 90 TABLET | Refills: 2 | Status: SHIPPED | OUTPATIENT
Start: 2025-01-03

## 2025-01-03 RX ORDER — IPRATROPIUM BROMIDE AND ALBUTEROL SULFATE 2.5; .5 MG/3ML; MG/3ML
3 SOLUTION RESPIRATORY (INHALATION) EVERY 4 HOURS
Qty: 360 ML | Refills: 1 | Status: SHIPPED | OUTPATIENT
Start: 2025-01-03

## 2025-01-03 RX ORDER — ALPRAZOLAM 0.25 MG/1
0.25 TABLET ORAL 3 TIMES DAILY PRN
Qty: 90 TABLET | Refills: 2 | Status: SHIPPED | OUTPATIENT
Start: 2025-01-03 | End: 2025-01-03

## 2025-01-03 NOTE — ASSESSMENT & PLAN NOTE
We extensively reviewed her diet.  She is going to try and increase caloric intake.  Recheck in 3 months.  She has increased her weight by 3 pounds

## 2025-01-03 NOTE — PROGRESS NOTES
Patient Name: Precious Parmar  : 1950   MRN: [id number]     Chief Complaint:    Chief Complaint   Patient presents with    Primary Care Follow-Up       History of Present Illness: Precious Parmar is a 74 y.o. female who is here today for follow up on breathing and blood pressure.  HPI        Review of Systems:   Review of Systems   Constitutional:  Positive for fatigue. Negative for chills, diaphoresis and fever.   HENT:  Negative for congestion and sore throat.    Respiratory:  Positive for shortness of breath. Negative for cough.    Cardiovascular:  Negative for chest pain.   Gastrointestinal:  Negative for abdominal pain, nausea and vomiting.   Genitourinary:  Negative for dysuria.   Musculoskeletal:  Negative for myalgias and neck pain.   Skin:  Negative for rash.   Neurological:  Positive for weakness. Negative for numbness and headaches.        Past Medical History:   Past Medical History:   Diagnosis Date    Age-related osteoporosis without current pathological fracture     Allergic     Allergic rhinitis     Anxiety     AR (allergic rhinitis)     Asthma     Cataract     Chronic constipation     Chronic constipation     Chronic granulomatous disease     CKD (chronic kidney disease), stage III     COLD (chronic obstructive lung disease)     COPD (chronic obstructive pulmonary disease)     Coronary arteriosclerosis in native artery     Coronary artery disease     Diverticular disease of colon     Diverticulosis     SEVERE    Dyslipidemia     Emphysema lung     Encounter for immunization 2018    Endometriosis     High risk medication use     History of chest x-ray 2014    chronic change, no active disease     History of chest x-ray 2014    no acute change; findings consistent with COPD     History of chest x-ray 2014    no evidence of acute cardiopulmonary disease     History of echocardiogram 2014    EF 50-55%; E to A reversal in MV flow pattern suggestive of diastolic  dysfunction    History of PFTs 11/20/2015    FVC 2.29 L 89%, FEV1 0.67 L, 34%, ratio 29%, after Xopenex HFA FEV1 improves to 0.81 L or 42%    History of PFTs 06/15/2015    data is acceptable and reproducible; pt given 4 puffs of albuterol; pt gave good effort.     History of PFTs 11/20/2014    data is acceptable and reproducible; pt given 3 puffs of xopenex; pt gave good effort     HL (hearing loss)     Hyperlipidemia     MIXED    Hypertension     Hypothyroidism     Hypoxemia requiring supplemental oxygen     Kidney stone     Muscle pain     Myalgia     ARTHRALGIA    Nephrolithiasis     Non-toxic multinodular goiter 03/2017    NORMAL THYROID FUNCTION    Osteopenia     Osteoporosis     Panacinar emphysema     Renal mass     L/LOWER    Senile osteoporosis     Statin intolerance     Tobacco use disorder     QUIT 2014    Vitamin B12 deficiency     Vitamin D deficiency        Past Surgical History:   Past Surgical History:   Procedure Laterality Date    APPENDECTOMY  Oct 1970    Removed with hysterectomy    CARDIAC CATHETERIZATION      CORONARY ANGIOPLASTY WITH STENT PLACEMENT      Status post diagnostic left heart catheterization, selective coronary angiography, left ventriculography, and stenting on 11/29/06, performed by Dr. Emir Lindquist.JOSEPH    CORONARY STENT PLACEMENT  Nov 2006    HYSTERECTOMY      Age 20    OOPHORECTOMY      Age 20    ROTATOR CUFF REPAIR  2000    TONSILLECTOMY         Family History:   Family History   Problem Relation Age of Onset    Heart attack Mother     Stroke Mother     Hypertension Mother     Arthritis Mother     Heart disease Mother     Kidney disease Mother     Vision loss Mother     Arthritis Father     Cancer Father         Prostate cancer    Vision loss Father     Cancer Brother         Lung cancer    Hypertension Brother     Hypertension Brother     Breast cancer Paternal Aunt         age unknown     Cancer Other     Ovarian cancer Neg Hx        Social History:   Social History      Socioeconomic History    Marital status:    Tobacco Use    Smoking status: Former     Current packs/day: 0.00     Average packs/day: 1 pack/day for 46.1 years (46.1 ttl pk-yrs)     Types: Cigarettes     Start date: 1/1/1968     Quit date: 2/2/2014     Years since quitting: 10.9     Passive exposure: Past    Smokeless tobacco: Never   Vaping Use    Vaping status: Never Used   Substance and Sexual Activity    Alcohol use: Not Currently    Drug use: Never    Sexual activity: Not Currently     Partners: Male     Birth control/protection: None, Hysterectomy       Medications:     Current Outpatient Medications:     ALPRAZolam (XANAX) 0.25 MG tablet, Take 1 tablet by mouth 3 (Three) Times a Day As Needed for Anxiety., Disp: 90 tablet, Rfl: 2    ipratropium-albuterol (DUO-NEB) 0.5-2.5 mg/3 ml nebulizer, Take 3 mL by nebulization Every 4 (Four) Hours., Disp: 360 mL, Rfl: 1    albuterol sulfate  (90 Base) MCG/ACT inhaler, INHALE TWO PUFFS BY MOUTH FOUR TIMES DAILY, Disp: 8.5 g, Rfl: 2    aspirin 81 MG EC tablet, Take 1 tablet by mouth Daily., Disp: 90 tablet, Rfl: 3    Cholecalciferol 50 MCG (2000 UT) tablet, Take 2 tablets by mouth Daily., Disp: , Rfl:     FOLIC ACID PO, Take 800 mcg by mouth Daily. OTC, Disp: , Rfl:     metoprolol succinate XL (TOPROL-XL) 25 MG 24 hr tablet, Take 1 tablet by mouth Daily., Disp: 90 tablet, Rfl: 1    mirtazapine (REMERON) 15 MG tablet, Take 1 tablet by mouth every night at bedtime., Disp: 90 tablet, Rfl: 2    nitroglycerin (NITROSTAT) 0.4 MG SL tablet, 1 under the tongue as needed for angina, may repeat q5mins for up three doses, Disp: 100 tablet, Rfl: 11    O2 (OXYGEN), 2 L/min., Disp: , Rfl:     Omega-3 Fatty Acids (FISH OIL) 1000 MG capsule capsule, Take 2 capsules by mouth Daily., Disp: , Rfl:     ranolazine (RANEXA) 1000 MG 12 hr tablet, Take 1 tablet by mouth 2 (Two) Times a Day., Disp: 180 tablet, Rfl: 3    vitamin B-12 (CYANOCOBALAMIN) 1000 MCG tablet, Take 1 tablet  "by mouth 1 (One) Time Per Week. Once a week, Disp: , Rfl:     Zinc 50 MG tablet, , Disp: , Rfl:     Allergies:   Allergies   Allergen Reactions    Ceclor [Cefaclor]     Cephalosporins Unknown - High Severity    Codeine     Diltiazem Swelling    Doxycycline Diarrhea and Nausea And Vomiting    Olodaterol Unknown - High Severity    Penicillins     Statins     Tiotropium Unknown - High Severity         Physical Exam:  Vital Signs:   Vitals:    01/03/25 1056   BP: 128/84   BP Location: Left arm   Patient Position: Sitting   Cuff Size: Adult   Pulse: 68   SpO2: 96%   Weight: 38.5 kg (84 lb 12.8 oz)   Height: 149.9 cm (59.02\")     Body mass index is 17.12 kg/m².     Physical Exam  Vitals and nursing note reviewed.   Constitutional:       Appearance: Normal appearance. She is normal weight.   HENT:      Head: Normocephalic and atraumatic.      Right Ear: Tympanic membrane, ear canal and external ear normal.      Left Ear: Tympanic membrane, ear canal and external ear normal.      Nose: Nose normal.      Mouth/Throat:      Mouth: Mucous membranes are dry.      Pharynx: Oropharynx is clear.   Eyes:      Extraocular Movements: Extraocular movements intact.      Conjunctiva/sclera: Conjunctivae normal.      Pupils: Pupils are equal, round, and reactive to light.   Cardiovascular:      Rate and Rhythm: Normal rate and regular rhythm.      Pulses: Normal pulses.      Heart sounds: Normal heart sounds.   Pulmonary:      Effort: Pulmonary effort is normal.      Breath sounds: Normal breath sounds.   Musculoskeletal:      Cervical back: Normal range of motion and neck supple.   Feet:      Comments:      Neurological:      Mental Status: She is alert.         Procedures      Assessment/Plan:   Diagnoses and all orders for this visit:    1. Colon cancer screening (Primary)  -     Hemoglobin A1c; Future  -     Lipid Panel; Future  -     Comprehensive Metabolic Panel; Future  -     Vitamin B12; Future  -     Vitamin D,25-Hydroxy; " Future  -     CBC & Differential; Future  -     TSH Rfx On Abnormal To Free T4; Future    2. Primary hypertension  Assessment & Plan:  Discussed with patient to monitor their blood pressure and if systolic blood pressure goes above 140 or diastolic is above 90 to return to clinic.  Take medicines as directed, call for any problems, patient not having or any worrisome symptoms.        Orders:  -     Hemoglobin A1c; Future  -     Lipid Panel; Future  -     Comprehensive Metabolic Panel; Future  -     Vitamin B12; Future  -     Vitamin D,25-Hydroxy; Future  -     CBC & Differential; Future  -     TSH Rfx On Abnormal To Free T4; Future    3. Mixed hyperlipidemia  Assessment & Plan:  Blood work in 3 months    Orders:  -     Hemoglobin A1c; Future  -     Lipid Panel; Future  -     Comprehensive Metabolic Panel; Future  -     Vitamin B12; Future  -     Vitamin D,25-Hydroxy; Future  -     CBC & Differential; Future  -     TSH Rfx On Abnormal To Free T4; Future    4. Hyperglycemia  Assessment & Plan:  Blood work    Orders:  -     Hemoglobin A1c; Future  -     Lipid Panel; Future  -     Comprehensive Metabolic Panel; Future  -     Vitamin B12; Future  -     Vitamin D,25-Hydroxy; Future  -     CBC & Differential; Future  -     TSH Rfx On Abnormal To Free T4; Future    5. Low vitamin B12 level  Assessment & Plan:  Blood work    Orders:  -     Hemoglobin A1c; Future  -     Lipid Panel; Future  -     Comprehensive Metabolic Panel; Future  -     Vitamin B12; Future  -     Vitamin D,25-Hydroxy; Future  -     CBC & Differential; Future  -     TSH Rfx On Abnormal To Free T4; Future    6. Hyperthyroidism  -     Hemoglobin A1c; Future  -     Lipid Panel; Future  -     Comprehensive Metabolic Panel; Future  -     Vitamin B12; Future  -     Vitamin D,25-Hydroxy; Future  -     CBC & Differential; Future  -     TSH Rfx On Abnormal To Free T4; Future    7. Loss of weight  Assessment & Plan:  We extensively reviewed her diet.  She is going  to try and increase caloric intake.  Recheck in 3 months.  She has increased her weight by 3 pounds    Orders:  -     Hemoglobin A1c; Future  -     Lipid Panel; Future  -     Comprehensive Metabolic Panel; Future  -     Vitamin B12; Future  -     Vitamin D,25-Hydroxy; Future  -     CBC & Differential; Future  -     TSH Rfx On Abnormal To Free T4; Future    8. Vitamin D deficiency  Assessment & Plan:  Blood work    Orders:  -     Hemoglobin A1c; Future  -     Lipid Panel; Future  -     Comprehensive Metabolic Panel; Future  -     Vitamin B12; Future  -     Vitamin D,25-Hydroxy; Future  -     CBC & Differential; Future  -     TSH Rfx On Abnormal To Free T4; Future    9. Centrilobular emphysema  Assessment & Plan:  Patient is oxygen dependent.  She has a has all her immunizations.  We will follow.    Orders:  -     Hemoglobin A1c; Future  -     Lipid Panel; Future  -     Comprehensive Metabolic Panel; Future  -     Vitamin B12; Future  -     Vitamin D,25-Hydroxy; Future  -     CBC & Differential; Future  -     TSH Rfx On Abnormal To Free T4; Future    10. History of tobacco use disorder  -     Hemoglobin A1c; Future  -     Lipid Panel; Future  -     Comprehensive Metabolic Panel; Future  -     Vitamin B12; Future  -     Vitamin D,25-Hydroxy; Future  -     CBC & Differential; Future  -     TSH Rfx On Abnormal To Free T4; Future    11. Coronary artery disease involving native heart, unspecified vessel or lesion type, unspecified whether angina present  Assessment & Plan:  Patient has seen cardiology.  Aggressive risk factor modification.    Orders:  -     Hemoglobin A1c; Future  -     Lipid Panel; Future  -     Comprehensive Metabolic Panel; Future  -     Vitamin B12; Future  -     Vitamin D,25-Hydroxy; Future  -     CBC & Differential; Future  -     TSH Rfx On Abnormal To Free T4; Future    12. Dyslipidemia  Assessment & Plan:  Blood work.    Orders:  -     Hemoglobin A1c; Future  -     Lipid Panel; Future  -      Comprehensive Metabolic Panel; Future  -     Vitamin B12; Future  -     Vitamin D,25-Hydroxy; Future  -     CBC & Differential; Future  -     TSH Rfx On Abnormal To Free T4; Future    13. Anxiety  Assessment & Plan:  Refilled her Xanax.  House bill 1.  Return to clinic in 3 months.    Orders:  -     Discontinue: ALPRAZolam (XANAX) 0.25 MG tablet; Take 1 tablet by mouth 3 (Three) Times a Day As Needed for Anxiety.  Dispense: 90 tablet; Refill: 2  -     ALPRAZolam (XANAX) 0.25 MG tablet; Take 1 tablet by mouth 3 (Three) Times a Day As Needed for Anxiety.  Dispense: 90 tablet; Refill: 2  -     Hemoglobin A1c; Future  -     Lipid Panel; Future  -     Comprehensive Metabolic Panel; Future  -     Vitamin B12; Future  -     Vitamin D,25-Hydroxy; Future  -     CBC & Differential; Future  -     TSH Rfx On Abnormal To Free T4; Future    14. Hypoxia  -     ipratropium-albuterol (DUO-NEB) 0.5-2.5 mg/3 ml nebulizer; Take 3 mL by nebulization Every 4 (Four) Hours.  Dispense: 360 mL; Refill: 1  -     Hemoglobin A1c; Future  -     Lipid Panel; Future  -     Comprehensive Metabolic Panel; Future  -     Vitamin B12; Future  -     Vitamin D,25-Hydroxy; Future  -     CBC & Differential; Future  -     TSH Rfx On Abnormal To Free T4; Future    15. Pulmonary emphysema, unspecified emphysema type  Assessment & Plan:  Patient is oxygen dependent.  She has a has all her immunizations.  We will follow.    Orders:  -     Discontinue: ALPRAZolam (XANAX) 0.25 MG tablet; Take 1 tablet by mouth 3 (Three) Times a Day As Needed for Anxiety.  Dispense: 90 tablet; Refill: 2  -     ALPRAZolam (XANAX) 0.25 MG tablet; Take 1 tablet by mouth 3 (Three) Times a Day As Needed for Anxiety.  Dispense: 90 tablet; Refill: 2  -     Hemoglobin A1c; Future  -     Lipid Panel; Future  -     Comprehensive Metabolic Panel; Future  -     Vitamin B12; Future  -     Vitamin D,25-Hydroxy; Future  -     CBC & Differential; Future  -     TSH Rfx On Abnormal To Free T4;  Future    16. Bilateral hearing loss, unspecified hearing loss type  Assessment & Plan:  Refer to Dr. Don in past.  She will call she wants to go back.    Orders:  -     Hemoglobin A1c; Future  -     Lipid Panel; Future  -     Comprehensive Metabolic Panel; Future  -     Vitamin B12; Future  -     Vitamin D,25-Hydroxy; Future  -     CBC & Differential; Future  -     TSH Rfx On Abnormal To Free T4; Future             Follow Up:   Return in about 3 months (around 4/3/2025) for Annual physical, Bloodwork 1 week prior to next appointment.      Wero Daley MD  [facility]     Answers submitted by the patient for this visit:  Primary Reason for Visit (Submitted on 12/27/2024)  What is the primary reason for your visit?: Problem Not Listed

## 2025-01-27 DIAGNOSIS — R09.02 HYPOXIA: ICD-10-CM

## 2025-01-27 RX ORDER — IPRATROPIUM BROMIDE AND ALBUTEROL SULFATE 2.5; .5 MG/3ML; MG/3ML
3 SOLUTION RESPIRATORY (INHALATION) EVERY 4 HOURS
Qty: 360 ML | Refills: 1 | Status: SHIPPED | OUTPATIENT
Start: 2025-01-27

## 2025-01-27 RX ORDER — IPRATROPIUM BROMIDE AND ALBUTEROL SULFATE 2.5; .5 MG/3ML; MG/3ML
3 SOLUTION RESPIRATORY (INHALATION) EVERY 4 HOURS
Qty: 360 ML | Refills: 1 | Status: SHIPPED | OUTPATIENT
Start: 2025-01-27 | End: 2025-01-27 | Stop reason: SDUPTHER

## 2025-02-03 DIAGNOSIS — R06.2 WHEEZING: ICD-10-CM

## 2025-02-03 RX ORDER — ALBUTEROL SULFATE 90 UG/1
2 INHALANT RESPIRATORY (INHALATION)
Qty: 8.5 G | Refills: 2 | Status: SHIPPED | OUTPATIENT
Start: 2025-02-03

## 2025-02-24 DIAGNOSIS — F43.29 ADJUSTMENT DISORDER WITH DISTURBANCE OF EMOTION: ICD-10-CM

## 2025-02-24 DIAGNOSIS — I25.112 CORONARY ARTERY DISEASE INVOLVING NATIVE CORONARY ARTERY OF NATIVE HEART WITH REFRACTORY ANGINA PECTORIS: ICD-10-CM

## 2025-02-24 RX ORDER — METOPROLOL SUCCINATE 25 MG/1
25 TABLET, EXTENDED RELEASE ORAL DAILY
Qty: 90 TABLET | Refills: 3 | Status: SHIPPED | OUTPATIENT
Start: 2025-02-24

## 2025-02-24 RX ORDER — MIRTAZAPINE 15 MG/1
15 TABLET, FILM COATED ORAL
Qty: 90 TABLET | Refills: 3 | Status: SHIPPED | OUTPATIENT
Start: 2025-02-24

## 2025-03-10 ENCOUNTER — TELEPHONE (OUTPATIENT)
Dept: CARDIOLOGY | Facility: CLINIC | Age: 75
End: 2025-03-10
Payer: MEDICARE
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
## 2025-03-10 NOTE — TELEPHONE ENCOUNTER
Caller: Precious Parmar    Relationship: Self    Best call back number: [phone]    What is the best time to reach you: ANY    Who are you requesting to speak with (clinical staff, provider,  specific staff member): CLINICAL    What was the call regarding: PLEASE CALL THE PATIENT TO DISCUSS MEDICATION SMETOPROLOL AND RANEXA CAUSING SIDE EFFECTS; PATIENT RECEIVED A LETTER FROM HER PHARMACY ADVISING WHAT THEY COULD DO AND SHE STATES SHE IS HAVING SOB ON EXERTION.     Is it okay if the provider responds through MyChart: NO

## 2025-03-26 ENCOUNTER — TELEPHONE (OUTPATIENT)
Dept: CARDIOLOGY | Facility: CLINIC | Age: 75
End: 2025-03-26
Payer: MEDICARE

## 2025-03-26 NOTE — TELEPHONE ENCOUNTER
Message received from [employer]    I recently got a message from [employer] regarding possibly getting too much Metoprolol in my system with the dosage of Ranolazine I was taking. I guess it scared me. You suggested that I cut back to the original dose of 500 mg twice a day instead of 1000 mg twice a day. You increased my medicine because the original dose was not stopping the angina & chest tightness. After being on the 500 mg twice a day for a week my symptoms returned. I am letting you know that I have returned to taking 1000 mg twice a day and the symptoms have stopped. I am monitoring my blood pressure & it seems fine just like it always has. My blurry vision is not from low b/p but cataracts. Dr Daley will do blood work on Apr 3rd. I think I am fine & will see you on May 9th. Your nurse can call me if needed.  Trustingly,  Precious Parmar

## 2025-03-26 NOTE — TELEPHONE ENCOUNTER
PT STATES THAT SHE SENT A MESSAGE ABOUT MEDICATION THROUGH E[employer]     PLEASE ADVISE PT ONCE RECEIVED

## 2025-04-14 NOTE — ASSESSMENT & PLAN NOTE
We have been monitoring this.  We have reviewed her diet multiple times.  Patient had been on megestrol and could not tolerate it.  Patient has lost 3 more pounds.  I told her if she does not gain 5 pounds in next month we will have to start her on some medicine to help her gain weight.  She has adamantly refused this in the past and this is AMA.  Recheck 1 month

## 2025-04-14 NOTE — PROGRESS NOTES
Patient Name: Precious Parmar  : 1950   MRN: [id number]     Chief Complaint:    Chief Complaint   Patient presents with    Hypertension       History of Present Illness: Precious Parmar is a 74 y.o. female who is here today for follow up on weight, breathing, blood sugar and cholesterol.  HPI        Review of Systems:   Review of Systems   Constitutional:  Negative for chills, diaphoresis, fatigue and fever.   HENT:  Positive for congestion. Negative for sore throat.    Eyes: Negative.    Respiratory: Negative.  Negative for cough.    Cardiovascular: Negative.  Negative for chest pain.   Gastrointestinal: Negative.  Negative for abdominal pain, nausea and vomiting.   Genitourinary:  Negative for dysuria.   Musculoskeletal:  Negative for myalgias and neck pain.   Skin:  Negative for rash.   Neurological:  Positive for weakness and headaches. Negative for numbness.        Past Medical History:   Past Medical History:   Diagnosis Date    Age-related osteoporosis without current pathological fracture     Allergic     Allergic rhinitis     Anxiety     AR (allergic rhinitis)     Asthma     Cataract     Chronic constipation     Chronic constipation     Chronic granulomatous disease     CKD (chronic kidney disease), stage III     COLD (chronic obstructive lung disease)     COPD (chronic obstructive pulmonary disease)     Coronary arteriosclerosis in native artery     Coronary artery disease     Diverticular disease of colon     Diverticulosis     SEVERE    Dyslipidemia     Emphysema lung     Encounter for immunization 2018    Endometriosis     High risk medication use     History of chest x-ray 2014    chronic change, no active disease     History of chest x-ray 2014    no acute change; findings consistent with COPD     History of chest x-ray 2014    no evidence of acute cardiopulmonary disease     History of echocardiogram 2014    EF 50-55%; E to A reversal in MV flow pattern  suggestive of diastolic dysfunction    History of PFTs 11/20/2015    FVC 2.29 L 89%, FEV1 0.67 L, 34%, ratio 29%, after Xopenex HFA FEV1 improves to 0.81 L or 42%    History of PFTs 06/15/2015    data is acceptable and reproducible; pt given 4 puffs of albuterol; pt gave good effort.     History of PFTs 11/20/2014    data is acceptable and reproducible; pt given 3 puffs of xopenex; pt gave good effort     HL (hearing loss)     Hyperlipidemia     MIXED    Hypertension     Hypothyroidism     Hypoxemia requiring supplemental oxygen     Kidney stone     Muscle pain     Myalgia     ARTHRALGIA    Nephrolithiasis     Non-toxic multinodular goiter 03/2017    NORMAL THYROID FUNCTION    Osteopenia     Osteoporosis     Panacinar emphysema     Renal mass     L/LOWER    Senile osteoporosis     Statin intolerance     Tobacco use disorder     QUIT 2014    Vitamin B12 deficiency     Vitamin D deficiency        Past Surgical History:   Past Surgical History:   Procedure Laterality Date    APPENDECTOMY  Oct 1970    Removed with hysterectomy    CARDIAC CATHETERIZATION      CORONARY ANGIOPLASTY WITH STENT PLACEMENT      Status post diagnostic left heart catheterization, selective coronary angiography, left ventriculography, and stenting on 11/29/06, performed by Dr. Emir Lindquist.HEATH.    CORONARY STENT PLACEMENT  Nov 2006    HYSTERECTOMY      Age 20    OOPHORECTOMY      Age 20    ROTATOR CUFF REPAIR  2000    TONSILLECTOMY         Family History:   Family History   Problem Relation Age of Onset    Heart attack Mother     Stroke Mother     Hypertension Mother     Arthritis Mother     Heart disease Mother     Kidney disease Mother     Vision loss Mother     Arthritis Father     Cancer Father         Prostate cancer    Vision loss Father     Cancer Brother         Lung cancer    Hypertension Brother     Hypertension Brother     Breast cancer Paternal Aunt         age unknown     Cancer Other     Ovarian cancer Neg Hx        Social History:    Social History     Socioeconomic History    Marital status:    Tobacco Use    Smoking status: Former     Current packs/day: 0.00     Average packs/day: 1 pack/day for 46.1 years (46.1 ttl pk-yrs)     Types: Cigarettes     Start date: 1968     Quit date: 2014     Years since quittin.2     Passive exposure: Past    Smokeless tobacco: Never   Vaping Use    Vaping status: Never Used   Substance and Sexual Activity    Alcohol use: Not Currently    Drug use: Never    Sexual activity: Not Currently     Partners: Male     Birth control/protection: None, Hysterectomy       Medications:     Current Outpatient Medications:     albuterol sulfate  (90 Base) MCG/ACT inhaler, Inhale 2 puffs 4 (Four) Times a Day., Disp: 8.5 g, Rfl: 2    ALPRAZolam (XANAX) 0.25 MG tablet, Take 1 tablet by mouth 3 (Three) Times a Day As Needed for Anxiety., Disp: 90 tablet, Rfl: 2    aspirin 81 MG EC tablet, Take 1 tablet by mouth Daily., Disp: 90 tablet, Rfl: 3    Cholecalciferol 50 MCG (2000 UT) tablet, Take 2 tablets by mouth Daily., Disp: , Rfl:     FOLIC ACID PO, Take 800 mcg by mouth Daily. OTC, Disp: , Rfl:     ipratropium-albuterol (DUO-NEB) 0.5-2.5 mg/3 ml nebulizer, Take 3 mL by nebulization Every 4 (Four) Hours., Disp: 360 mL, Rfl: 1    metoprolol succinate XL (TOPROL-XL) 25 MG 24 hr tablet, Take 1 tablet by mouth Daily., Disp: 90 tablet, Rfl: 3    mirtazapine (REMERON) 15 MG tablet, Take 1 tablet by mouth every night at bedtime., Disp: 90 tablet, Rfl: 3    nitroglycerin (NITROSTAT) 0.4 MG SL tablet, 1 under the tongue as needed for angina, may repeat q5mins for up three doses, Disp: 100 tablet, Rfl: 11    O2 (OXYGEN), 2 L/min., Disp: , Rfl:     Omega-3 Fatty Acids (FISH OIL) 1000 MG capsule capsule, Take 2 capsules by mouth Daily., Disp: , Rfl:     ranolazine (RANEXA) 1000 MG 12 hr tablet, Take 1 tablet by mouth 2 (Two) Times a Day., Disp: 180 tablet, Rfl: 3    vitamin B-12 (CYANOCOBALAMIN) 1000 MCG tablet,  "Take 1 tablet by mouth 1 (One) Time Per Week. Once a week, Disp: , Rfl:     Zinc 50 MG tablet, , Disp: , Rfl:     Allergies:   Allergies   Allergen Reactions    Ceclor [Cefaclor]     Cephalosporins Unknown - High Severity    Codeine     Diltiazem Swelling    Doxycycline Diarrhea and Nausea And Vomiting    Olodaterol Unknown - High Severity    Penicillins     Statins     Tiotropium Unknown - High Severity         Physical Exam:  Vital Signs:   Vitals:    04/15/25 1127   BP: 100/62   BP Location: Left arm   Patient Position: Sitting   Cuff Size: Adult   Pulse: 65   SpO2: 98%   Weight: 36.2 kg (79 lb 14.4 oz)   Height: 149.9 cm (59.02\")   PainSc: 0-No pain     Body mass index is 16.13 kg/m².     Physical Exam  Vitals and nursing note reviewed.   Constitutional:       Appearance: Normal appearance. She is normal weight.   HENT:      Head: Normocephalic and atraumatic.      Right Ear: Tympanic membrane, ear canal and external ear normal.      Left Ear: Tympanic membrane, ear canal and external ear normal.      Nose: Nose normal.      Mouth/Throat:      Mouth: Mucous membranes are dry.      Pharynx: Oropharynx is clear.   Eyes:      Extraocular Movements: Extraocular movements intact.      Conjunctiva/sclera: Conjunctivae normal.      Pupils: Pupils are equal, round, and reactive to light.   Cardiovascular:      Rate and Rhythm: Normal rate and regular rhythm.      Pulses: Normal pulses.      Heart sounds: Normal heart sounds.   Pulmonary:      Effort: Pulmonary effort is normal.      Breath sounds: Normal breath sounds.   Musculoskeletal:      Cervical back: Normal range of motion and neck supple.   Feet:      Comments:      Neurological:      Mental Status: She is alert.         Procedures      Assessment/Plan:   Diagnoses and all orders for this visit:    1. Loss of weight (Primary)  Assessment & Plan:  We have been monitoring this.  We have reviewed her diet multiple times.  Patient had been on megestrol and could not " tolerate it.  Patient has lost 3 more pounds.  I told her if she does not gain 5 pounds in next month we will have to start her on some medicine to help her gain weight.  She has adamantly refused this in the past and this is AMA.  Recheck 1 month    Orders:  -     Ambulatory Referral For Screening Colonoscopy  -     DEXA Bone Density Axial; Future  -     Comprehensive Metabolic Panel; Future  -     Hemoglobin A1c; Future  -     Lipid Panel; Future  -     CBC & Differential; Future  -     Vitamin B12; Future  -     TSH Rfx On Abnormal To Free T4; Future    2. Coronary artery disease involving native heart, unspecified vessel or lesion type, unspecified whether angina present  Assessment & Plan:  Patient has seen cardiology.  Aggressive risk factor modification.    Orders:  -     Ambulatory Referral For Screening Colonoscopy  -     DEXA Bone Density Axial; Future  -     Comprehensive Metabolic Panel; Future  -     Hemoglobin A1c; Future  -     Lipid Panel; Future  -     CBC & Differential; Future  -     Vitamin B12; Future  -     TSH Rfx On Abnormal To Free T4; Future    3. Dyslipidemia  Assessment & Plan:  Blood work.    Orders:  -     Ambulatory Referral For Screening Colonoscopy  -     DEXA Bone Density Axial; Future  -     Comprehensive Metabolic Panel; Future  -     Hemoglobin A1c; Future  -     Lipid Panel; Future  -     CBC & Differential; Future  -     Vitamin B12; Future  -     TSH Rfx On Abnormal To Free T4; Future    4. Primary hypertension  Assessment & Plan:  Discussed with patient to monitor their blood pressure and if systolic blood pressure goes above 140 or diastolic is above 90 to return to clinic.  Take medicines as directed, call for any problems, patient not having or any worrisome symptoms.        Orders:  -     Ambulatory Referral For Screening Colonoscopy  -     DEXA Bone Density Axial; Future  -     Comprehensive Metabolic Panel; Future  -     Hemoglobin A1c; Future  -     Lipid Panel;  Future  -     CBC & Differential; Future  -     Vitamin B12; Future  -     TSH Rfx On Abnormal To Free T4; Future    5. Hyperglycemia  Assessment & Plan:  Blood work    Orders:  -     Ambulatory Referral For Screening Colonoscopy  -     DEXA Bone Density Axial; Future  -     Comprehensive Metabolic Panel; Future  -     Hemoglobin A1c; Future  -     Lipid Panel; Future  -     CBC & Differential; Future  -     Vitamin B12; Future  -     TSH Rfx On Abnormal To Free T4; Future    6. Post-menopausal  -     DEXA Bone Density Axial; Future    7. Centrilobular emphysema  Assessment & Plan:  Patient is oxygen dependent.  She has a has all her immunizations.  We will follow.  Patient has not been able to afford her inhalers.  I am in to give her some Trelegy 200 mg samples.  Return to clinic in 1 month.               Follow Up:   Return in about 1 month (around 5/15/2025) for Annual physical.      Wero Daley MD  [facility]

## 2025-04-15 ENCOUNTER — OFFICE VISIT (OUTPATIENT)
Dept: FAMILY MEDICINE CLINIC | Facility: CLINIC | Age: 75
End: 2025-04-15
Payer: MEDICARE

## 2025-04-15 VITALS
BODY MASS INDEX: 16.11 KG/M2 | SYSTOLIC BLOOD PRESSURE: 100 MMHG | OXYGEN SATURATION: 98 % | HEART RATE: 65 BPM | WEIGHT: 79.9 LBS | DIASTOLIC BLOOD PRESSURE: 62 MMHG | HEIGHT: 59 IN

## 2025-04-15 DIAGNOSIS — R73.9 HYPERGLYCEMIA: ICD-10-CM

## 2025-04-15 DIAGNOSIS — Z78.0 POST-MENOPAUSAL: ICD-10-CM

## 2025-04-15 DIAGNOSIS — E78.5 DYSLIPIDEMIA: ICD-10-CM

## 2025-04-15 DIAGNOSIS — J43.2 CENTRILOBULAR EMPHYSEMA: ICD-10-CM

## 2025-04-15 DIAGNOSIS — I10 PRIMARY HYPERTENSION: ICD-10-CM

## 2025-04-15 DIAGNOSIS — R63.4 LOSS OF WEIGHT: Primary | ICD-10-CM

## 2025-04-15 DIAGNOSIS — I25.10 CORONARY ARTERY DISEASE INVOLVING NATIVE HEART, UNSPECIFIED VESSEL OR LESION TYPE, UNSPECIFIED WHETHER ANGINA PRESENT: ICD-10-CM

## 2025-04-15 NOTE — ASSESSMENT & PLAN NOTE
Patient is oxygen dependent.  She has a has all her immunizations.  We will follow.  Patient has not been able to afford her inhalers.  I am in to give her some Trelegy 200 mg samples.  Return to clinic in 1 month.

## 2025-04-16 ENCOUNTER — PREP FOR SURGERY (OUTPATIENT)
Dept: OTHER | Facility: HOSPITAL | Age: 75
End: 2025-04-16

## 2025-04-16 ENCOUNTER — RESULTS FOLLOW-UP (OUTPATIENT)
Dept: FAMILY MEDICINE CLINIC | Facility: CLINIC | Age: 75
End: 2025-04-16
Payer: MEDICARE

## 2025-04-16 DIAGNOSIS — R63.4 LOSS OF WEIGHT: Primary | ICD-10-CM

## 2025-04-16 LAB
ALBUMIN SERPL-MCNC: 4.4 G/DL (ref 3.8–4.8)
ALP SERPL-CCNC: 89 IU/L (ref 44–121)
ALT SERPL-CCNC: 9 IU/L (ref 0–32)
AST SERPL-CCNC: 15 IU/L (ref 0–40)
BASOPHILS # BLD AUTO: 0 X10E3/UL (ref 0–0.2)
BASOPHILS NFR BLD AUTO: 0 %
BILIRUB SERPL-MCNC: 0.5 MG/DL (ref 0–1.2)
BUN SERPL-MCNC: 15 MG/DL (ref 8–27)
BUN/CREAT SERPL: 22 (ref 12–28)
CALCIUM SERPL-MCNC: 9.7 MG/DL (ref 8.7–10.3)
CHLORIDE SERPL-SCNC: 98 MMOL/L (ref 96–106)
CHOLEST SERPL-MCNC: 196 MG/DL (ref 100–199)
CO2 SERPL-SCNC: 22 MMOL/L (ref 20–29)
CREAT SERPL-MCNC: 0.68 MG/DL (ref 0.57–1)
EGFRCR SERPLBLD CKD-EPI 2021: 90 ML/MIN/1.73
EOSINOPHIL # BLD AUTO: 0.1 X10E3/UL (ref 0–0.4)
EOSINOPHIL NFR BLD AUTO: 2 %
ERYTHROCYTE [DISTWIDTH] IN BLOOD BY AUTOMATED COUNT: 11.5 % (ref 11.7–15.4)
GLOBULIN SER CALC-MCNC: 2 G/DL (ref 1.5–4.5)
GLUCOSE SERPL-MCNC: 98 MG/DL (ref 70–99)
HBA1C MFR BLD: 4.9 % (ref 4.8–5.6)
HCT VFR BLD AUTO: 40.9 % (ref 34–46.6)
HDLC SERPL-MCNC: 82 MG/DL
HGB BLD-MCNC: 13.7 G/DL (ref 11.1–15.9)
IMM GRANULOCYTES # BLD AUTO: 0 X10E3/UL (ref 0–0.1)
IMM GRANULOCYTES NFR BLD AUTO: 1 %
LDLC SERPL CALC-MCNC: 99 MG/DL (ref 0–99)
LYMPHOCYTES # BLD AUTO: 1.4 X10E3/UL (ref 0.7–3.1)
LYMPHOCYTES NFR BLD AUTO: 19 %
MCH RBC QN AUTO: 32.2 PG (ref 26.6–33)
MCHC RBC AUTO-ENTMCNC: 33.5 G/DL (ref 31.5–35.7)
MCV RBC AUTO: 96 FL (ref 79–97)
MONOCYTES # BLD AUTO: 0.5 X10E3/UL (ref 0.1–0.9)
MONOCYTES NFR BLD AUTO: 7 %
NEUTROPHILS # BLD AUTO: 5.2 X10E3/UL (ref 1.4–7)
NEUTROPHILS NFR BLD AUTO: 71 %
PLATELET # BLD AUTO: 232 X10E3/UL (ref 150–450)
POTASSIUM SERPL-SCNC: 4.4 MMOL/L (ref 3.5–5.2)
PROT SERPL-MCNC: 6.4 G/DL (ref 6–8.5)
RBC # BLD AUTO: 4.26 X10E6/UL (ref 3.77–5.28)
SODIUM SERPL-SCNC: 140 MMOL/L (ref 134–144)
TRIGL SERPL-MCNC: 83 MG/DL (ref 0–149)
TSH SERPL DL<=0.005 MIU/L-ACNC: 0.62 UIU/ML (ref 0.45–4.5)
VIT B12 SERPL-MCNC: 641 PG/ML (ref 232–1245)
VLDLC SERPL CALC-MCNC: 15 MG/DL (ref 5–40)
WBC # BLD AUTO: 7.3 X10E3/UL (ref 3.4–10.8)

## 2025-04-17 DIAGNOSIS — J43.9 PULMONARY EMPHYSEMA, UNSPECIFIED EMPHYSEMA TYPE: ICD-10-CM

## 2025-04-17 DIAGNOSIS — R06.2 WHEEZING: ICD-10-CM

## 2025-04-17 DIAGNOSIS — F41.9 ANXIETY: ICD-10-CM

## 2025-04-17 RX ORDER — ALBUTEROL SULFATE 90 UG/1
2 INHALANT RESPIRATORY (INHALATION)
Qty: 8.5 G | Refills: 2 | Status: SHIPPED | OUTPATIENT
Start: 2025-04-17

## 2025-04-17 RX ORDER — ALPRAZOLAM 0.25 MG
0.25 TABLET ORAL 3 TIMES DAILY PRN
Qty: 90 TABLET | Refills: 2 | Status: SHIPPED | OUTPATIENT
Start: 2025-04-17

## 2025-05-02 DIAGNOSIS — R09.02 HYPOXIA: ICD-10-CM

## 2025-05-02 RX ORDER — IPRATROPIUM BROMIDE AND ALBUTEROL SULFATE 2.5; .5 MG/3ML; MG/3ML
3 SOLUTION RESPIRATORY (INHALATION) EVERY 4 HOURS
Qty: 360 ML | Refills: 1 | Status: SHIPPED | OUTPATIENT
Start: 2025-05-02

## 2025-05-02 NOTE — TELEPHONE ENCOUNTER
Rx Refill Note    Requested Prescriptions     Pending Prescriptions Disp Refills    ipratropium-albuterol (DUO-NEB) 0.5-2.5 mg/3 ml nebulizer 360 mL 1     Sig: Take 3 mL by nebulization Every 4 (Four) Hours.        Last office visit with prescribing clinician: 4/15/2025      Next office visit with prescribing clinician: 5/15/2025   Last labs:   Last refill: needs   Pharmacy (be sure to add in Epic). correct

## 2025-05-14 NOTE — ASSESSMENT & PLAN NOTE
We have been monitoring this.  We have reviewed her diet multiple times.  Patient had been on megestrol and could not tolerate it.  Patient has lost 3 more pounds.  I told her if she did not gain 5 pounds in last month we will have to start her on some medicine to help her gain weight.  She lost 4 ounces.  We are going to increase her mirtazapine to 30 mg.  She absolutely refuses Megace.  Recheck in 3 months.

## 2025-05-14 NOTE — ASSESSMENT & PLAN NOTE
Patient is oxygen dependent.  She has a has all her immunizations.  We will follow.  Patient has not been able to afford her inhalers.  I gave her some Trelegy 200 mg samples.

## 2025-05-14 NOTE — PROGRESS NOTES
Patient Name: Precious Parmar  : 1950   MRN: [id number]     Chief Complaint:  No chief complaint on file.      History of Present Illness: Precious Parmar is a 75 y.o. female who is here today for follow up on loss of weight, emphysema, and coronary artery disease  HPI        Review of Systems:   Review of Systems   Constitutional: Negative.    HENT: Negative.     Eyes: Negative.    Respiratory: Negative.     Cardiovascular: Negative.    Gastrointestinal: Negative.    Neurological: Negative.         Past Medical History:   Past Medical History:   Diagnosis Date    Age-related osteoporosis without current pathological fracture     Allergic     Allergic rhinitis     Anxiety     AR (allergic rhinitis)     Asthma     Cataract     Chronic constipation     Chronic constipation     Chronic granulomatous disease     CKD (chronic kidney disease), stage III     COLD (chronic obstructive lung disease)     COPD (chronic obstructive pulmonary disease)     Coronary arteriosclerosis in native artery     Coronary artery disease     Diverticular disease of colon     Diverticulosis     SEVERE    Dyslipidemia     Emphysema lung     Encounter for immunization 2018    Endometriosis     High risk medication use     History of chest x-ray 2014    chronic change, no active disease     History of chest x-ray 2014    no acute change; findings consistent with COPD     History of chest x-ray 2014    no evidence of acute cardiopulmonary disease     History of echocardiogram 2014    EF 50-55%; E to A reversal in MV flow pattern suggestive of diastolic dysfunction    History of PFTs 2015    FVC 2.29 L 89%, FEV1 0.67 L, 34%, ratio 29%, after Xopenex HFA FEV1 improves to 0.81 L or 42%    History of PFTs 06/15/2015    data is acceptable and reproducible; pt given 4 puffs of albuterol; pt gave good effort.     History of PFTs 2014    data is acceptable and reproducible; pt given 3 puffs of  xopenex; pt gave good effort     HL (hearing loss)     Hyperlipidemia     MIXED    Hypertension     Hypothyroidism     Hypoxemia requiring supplemental oxygen     Kidney stone     Muscle pain     Myalgia     ARTHRALGIA    Nephrolithiasis     Non-toxic multinodular goiter 2017    NORMAL THYROID FUNCTION    Osteopenia     Osteoporosis     Panacinar emphysema     Renal mass     L/LOWER    Senile osteoporosis     Statin intolerance     Tobacco use disorder     QUIT     Vitamin B12 deficiency     Vitamin D deficiency        Past Surgical History:   Past Surgical History:   Procedure Laterality Date    APPENDECTOMY  Oct 1970    Removed with hysterectomy    CARDIAC CATHETERIZATION      CORONARY ANGIOPLASTY WITH STENT PLACEMENT      Status post diagnostic left heart catheterization, selective coronary angiography, left ventriculography, and stenting on 06, performed by Dr. Emir Lindquist.JOSEPH    CORONARY STENT PLACEMENT  2006    HYSTERECTOMY      Age 20    OOPHORECTOMY      Age 20    ROTATOR CUFF REPAIR      TONSILLECTOMY         Family History:   Family History   Problem Relation Age of Onset    Heart attack Mother     Stroke Mother     Hypertension Mother     Arthritis Mother     Heart disease Mother     Kidney disease Mother     Vision loss Mother     Arthritis Father     Cancer Father         Prostate cancer    Vision loss Father     Cancer Brother         Lung cancer    Hypertension Brother     Hypertension Brother     Breast cancer Paternal Aunt         age unknown     Cancer Other     Ovarian cancer Neg Hx        Social History:   Social History     Socioeconomic History    Marital status:    Tobacco Use    Smoking status: Former     Current packs/day: 0.00     Average packs/day: 1 pack/day for 46.1 years (46.1 ttl pk-yrs)     Types: Cigarettes     Start date: 1968     Quit date: 2014     Years since quittin.2     Passive exposure: Past    Smokeless tobacco: Never   Vaping Use     Vaping status: Never Used   Substance and Sexual Activity    Alcohol use: Not Currently    Drug use: Never    Sexual activity: Not Currently     Partners: Male     Birth control/protection: None, Hysterectomy       Medications:     Current Outpatient Medications:     albuterol sulfate  (90 Base) MCG/ACT inhaler, Inhale 2 puffs 4 (Four) Times a Day., Disp: 8.5 g, Rfl: 2    ALPRAZolam (XANAX) 0.25 MG tablet, Take 1 tablet by mouth 3 (Three) Times a Day As Needed for Anxiety., Disp: 90 tablet, Rfl: 2    aspirin 81 MG EC tablet, Take 1 tablet by mouth Daily., Disp: 90 tablet, Rfl: 3    Cholecalciferol 50 MCG (2000 UT) tablet, Take 2 tablets by mouth Daily., Disp: , Rfl:     FOLIC ACID PO, Take 800 mcg by mouth Daily. OTC, Disp: , Rfl:     ipratropium-albuterol (DUO-NEB) 0.5-2.5 mg/3 ml nebulizer, Take 3 mL by nebulization Every 4 (Four) Hours., Disp: 360 mL, Rfl: 1    metoprolol succinate XL (TOPROL-XL) 25 MG 24 hr tablet, Take 1 tablet by mouth Daily., Disp: 90 tablet, Rfl: 3    nitroglycerin (NITROSTAT) 0.4 MG SL tablet, 1 under the tongue as needed for angina, may repeat q5mins for up three doses, Disp: 100 tablet, Rfl: 11    O2 (OXYGEN), 2 L/min., Disp: , Rfl:     Omega-3 Fatty Acids (FISH OIL) 1000 MG capsule capsule, Take 2 capsules by mouth Daily., Disp: , Rfl:     ranolazine (RANEXA) 1000 MG 12 hr tablet, Take 1 tablet by mouth 2 (Two) Times a Day., Disp: 180 tablet, Rfl: 3    vitamin B-12 (CYANOCOBALAMIN) 1000 MCG tablet, Take 1 tablet by mouth 1 (One) Time Per Week. Once a week, Disp: , Rfl:     Zinc 50 MG tablet, , Disp: , Rfl:     mirtazapine (Remeron) 30 MG tablet, Take 1 tablet by mouth Every Night., Disp: 90 tablet, Rfl: 1    Allergies:   Allergies   Allergen Reactions    Ceclor [Cefaclor]     Cephalosporins Unknown - High Severity    Codeine     Diltiazem Swelling    Doxycycline Diarrhea and Nausea And Vomiting    Olodaterol Unknown - High Severity    Penicillins     Statins     Tiotropium Unknown  "- High Severity         Physical Exam:  Vital Signs:   Vitals:    05/15/25 1121   BP: 140/82   BP Location: Left arm   Patient Position: Sitting   Cuff Size: Adult   Pulse: 94   SpO2: 98%   Weight: 36.2 kg (79 lb 12.8 oz)   Height: 149.9 cm (59.02\")   PainSc: 0-No pain     Body mass index is 16.11 kg/m².     Physical Exam  Vitals and nursing note reviewed.   Constitutional:       Appearance: Normal appearance. She is normal weight.   HENT:      Head: Normocephalic and atraumatic.      Right Ear: Tympanic membrane, ear canal and external ear normal.      Left Ear: Tympanic membrane, ear canal and external ear normal.      Nose: Nose normal.      Mouth/Throat:      Mouth: Mucous membranes are dry.      Pharynx: Oropharynx is clear.   Eyes:      Extraocular Movements: Extraocular movements intact.      Conjunctiva/sclera: Conjunctivae normal.      Pupils: Pupils are equal, round, and reactive to light.   Cardiovascular:      Rate and Rhythm: Normal rate and regular rhythm.      Pulses: Normal pulses.      Heart sounds: Normal heart sounds.   Pulmonary:      Effort: Pulmonary effort is normal.      Breath sounds: Normal breath sounds.   Musculoskeletal:      Cervical back: Normal range of motion and neck supple.   Feet:      Comments:      Neurological:      Mental Status: She is alert.         Procedures      Assessment/Plan:   Diagnoses and all orders for this visit:    1. Centrilobular emphysema (Primary)  Assessment & Plan:  Patient is oxygen dependent.  She has a has all her immunizations.  We will follow.  Patient has not been able to afford her inhalers.  I gave her some Trelegy 200 mg samples.      Orders:  -     Measles / Mumps / Rubella Immunity; Future  -     Measles / Mumps / Rubella Immunity    2. Loss of weight  Assessment & Plan:  We have been monitoring this.  We have reviewed her diet multiple times.  Patient had been on megestrol and could not tolerate it.  Patient has lost 3 more pounds.  I told her " if she did not gain 5 pounds in last month we will have to start her on some medicine to help her gain weight.  She lost 4 ounces.  We are going to increase her mirtazapine to 30 mg.  She absolutely refuses Megace.  Recheck in 3 months.      3. Primary hypertension  Assessment & Plan:  Discussed with patient to monitor their blood pressure and if systolic blood pressure goes above 140 or diastolic is above 90 to return to clinic.  Take medicines as directed, call for any problems, patient not having or any worrisome symptoms.          4. Mixed hyperlipidemia  Assessment & Plan:  HDL 82.  LDL 99.  Recheck in 6 months.      Other orders  -     mirtazapine (Remeron) 30 MG tablet; Take 1 tablet by mouth Every Night.  Dispense: 90 tablet; Refill: 1             Follow Up:   No follow-ups on file.      Wero Daley MD  [facility]

## 2025-05-15 ENCOUNTER — OFFICE VISIT (OUTPATIENT)
Dept: FAMILY MEDICINE CLINIC | Facility: CLINIC | Age: 75
End: 2025-05-15
Payer: MEDICARE

## 2025-05-15 VITALS
BODY MASS INDEX: 16.09 KG/M2 | OXYGEN SATURATION: 98 % | WEIGHT: 79.8 LBS | SYSTOLIC BLOOD PRESSURE: 140 MMHG | HEIGHT: 59 IN | DIASTOLIC BLOOD PRESSURE: 82 MMHG | HEART RATE: 94 BPM

## 2025-05-15 DIAGNOSIS — J43.2 CENTRILOBULAR EMPHYSEMA: Primary | ICD-10-CM

## 2025-05-15 DIAGNOSIS — E78.2 MIXED HYPERLIPIDEMIA: ICD-10-CM

## 2025-05-15 DIAGNOSIS — R63.4 LOSS OF WEIGHT: ICD-10-CM

## 2025-05-15 DIAGNOSIS — I10 PRIMARY HYPERTENSION: ICD-10-CM

## 2025-05-15 RX ORDER — MIRTAZAPINE 30 MG/1
30 TABLET, FILM COATED ORAL NIGHTLY
Qty: 90 TABLET | Refills: 1 | Status: SHIPPED | OUTPATIENT
Start: 2025-05-15 | End: 2025-05-19

## 2025-05-16 ENCOUNTER — RESULTS FOLLOW-UP (OUTPATIENT)
Dept: FAMILY MEDICINE CLINIC | Facility: CLINIC | Age: 75
End: 2025-05-16
Payer: MEDICARE

## 2025-05-16 LAB
Lab: NORMAL
MEV IGG SER IA-ACNC: 133 AU/ML
MUV IGG SER IA-ACNC: 36 AU/ML
RUBV IGG SERPL IA-ACNC: 5.55 INDEX

## 2025-05-19 RX ORDER — MIRTAZAPINE 15 MG/1
15 TABLET, FILM COATED ORAL NIGHTLY
COMMUNITY
End: 2025-05-19 | Stop reason: SDUPTHER

## 2025-05-19 RX ORDER — MIRTAZAPINE 15 MG/1
15 TABLET, FILM COATED ORAL NIGHTLY
Qty: 90 TABLET | Refills: 1 | Status: SHIPPED | OUTPATIENT
Start: 2025-05-19

## 2025-05-23 ENCOUNTER — TELEPHONE (OUTPATIENT)
Dept: FAMILY MEDICINE CLINIC | Facility: CLINIC | Age: 75
End: 2025-05-23
Payer: MEDICARE

## 2025-05-23 RX ORDER — ALENDRONATE SODIUM 70 MG/1
70 TABLET ORAL
Qty: 12 TABLET | Refills: 1 | Status: SHIPPED | OUTPATIENT
Start: 2025-05-23

## 2025-05-27 ENCOUNTER — OFFICE VISIT (OUTPATIENT)
Dept: CARDIOLOGY | Facility: CLINIC | Age: 75
End: 2025-05-27
Payer: MEDICARE

## 2025-05-27 VITALS
WEIGHT: 79.8 LBS | SYSTOLIC BLOOD PRESSURE: 130 MMHG | HEIGHT: 59 IN | DIASTOLIC BLOOD PRESSURE: 84 MMHG | HEART RATE: 95 BPM | OXYGEN SATURATION: 90 % | BODY MASS INDEX: 16.09 KG/M2 | RESPIRATION RATE: 18 BRPM

## 2025-05-27 DIAGNOSIS — I25.112 CORONARY ARTERY DISEASE INVOLVING NATIVE CORONARY ARTERY OF NATIVE HEART WITH REFRACTORY ANGINA PECTORIS: Primary | ICD-10-CM

## 2025-05-27 DIAGNOSIS — E78.2 MIXED HYPERLIPIDEMIA: ICD-10-CM

## 2025-05-27 DIAGNOSIS — I10 PRIMARY HYPERTENSION: ICD-10-CM

## 2025-05-27 RX ORDER — NITROGLYCERIN 0.4 MG/1
TABLET SUBLINGUAL
Qty: 25 TABLET | Refills: 11 | Status: SHIPPED | OUTPATIENT
Start: 2025-05-27

## 2025-05-27 NOTE — ASSESSMENT & PLAN NOTE
Lipid abnormalities are improving with treatment    Plan:  Continue same medication/s without change.  Fish oil 1000 mg twice daily.  Allergy to statins.  Frailty limiting more aggressive medical therapy.    Counseled patient on lifestyle modifications to help control hyperlipidemia.     Lab Results   Component Value Date    LDL 99 04/15/2025    LDL 99 12/22/2023    HDL 82 04/15/2025    HDL 68 12/22/2023    CHLPL 196 04/15/2025    CHLPL 183 12/22/2023    TRIG 83 04/15/2025    TRIG 87 12/22/2023       Patient Treatment Goals:   LDL goal is less than 70; not to target.  Can consider ezetimibe or Repatha next visit if stable condition/overall general health    Followup in 6 months.

## 2025-05-27 NOTE — ASSESSMENT & PLAN NOTE
Coronary artery disease:  November 2006: [facility] with PTCA for septal  2.5 x 8 Cypher to ostium of LAD/RAMUS.  Normal LVEF.   April 2011: MPS per [facility]:  Normal perfusion, ejection fraction preserved.  9/14 echo wnl IDD  Coronary artery disease is unchanged. CCS 1 angina, with no significant chest pains reported on optimal medical therapy.  Stress test showing old myocardial infarction but no ischemia.  Echo very limited in view of poor windows, so unable to detect regional motion modalities there, repeat with contrast showing no regional motion normalities, possibly related to severe ischemia versus small infarction not seen on echo.  Coronary CTA negative for severe CAD except at the site of the prior stent, that was not seen very well.  Severe emphysema complicating the presentation.  High risk for cardiac catheterization in view of weight loss/cachexia/poor appetite and severe emphysema.  Continue metoprolol succinate 25 mg 1 tab daily, aspirin 81 mg p.o. daily, ranolazine to 1000 mg p.o. twice daily.  No room in blood pressure for additional medical therapy.  LDL to goal so deferring ezetimibe for now.  Clinical follow-up.

## 2025-05-27 NOTE — PROGRESS NOTES
[facility]  [facility] CARDIOLOGY  [address] [place] [address]  Dept: [phone]  Dept Fax: [phone]    Date: 05/27/2025  Patient: Precious Parmar  YOB: 1950    Follow Up Office Visit Note    Interval Follow-up  Ms. Precious Parmar is a 75 y.o. female who is here for follow-up on Coronary Artery Disease.    Subjective   Patient doing well with no acute complaints.  Patient denies angina, orthopnea, PND, palpitations, lightheadedness, syncope or medications side-effects.  Patient had stopped ranolazine based on pharmacist recommendation, started to have chest pain, went back on Ranexa with no recurrence of chest pain.  The following portions of the patient's history were reviewed and updated as appropriate: allergies, current medications, past family history, past medical history, past social history, past surgical history, and problem list.    Medications:   Allergies   Allergen Reactions    Ceclor [Cefaclor]     Cephalosporins Unknown - High Severity    Codeine     Diltiazem Swelling    Doxycycline Diarrhea and Nausea And Vomiting    Olodaterol Unknown - High Severity    Penicillins     Statins     Tiotropium Unknown - High Severity      Current Outpatient Medications   Medication Instructions    albuterol sulfate  (90 Base) MCG/ACT inhaler 2 puffs, Inhalation, 4 Times Daily - RT    alendronate (FOSAMAX) 70 mg, Oral, Every 7 Days    ALPRAZolam (XANAX) 0.25 mg, Oral, 3 Times Daily PRN    aspirin 81 mg, Oral, Daily    Cholecalciferol 50 MCG (2000 UT) tablet 2 tablets, Daily    fish oil 2,000 mg, Daily    FOLIC ACID  mcg, Daily    ipratropium-albuterol (DUO-NEB) 0.5-2.5 mg/3 ml nebulizer 3 mL, Nebulization, Every 4 Hours    metoprolol succinate XL (TOPROL-XL) 25 mg, Oral, Daily    mirtazapine (REMERON) 15 mg, Oral, Nightly    nitroglycerin (NITROSTAT) 0.4 MG SL tablet 1 under the tongue as needed for angina, may repeat every 5 mins for up three  "doses.  If no resolution of chest pain, call 911 or head to the nearest emergency room.    O2 (OXYGEN) 2 L/min.    ranolazine (RANEXA) 1,000 mg, Oral, 2 Times Daily    vitamin B-12 (CYANOCOBALAMIN) 1,000 mcg, Weekly    Zinc 50 MG tablet No dose, route, or frequency recorded.       Tobacco Use: Medium Risk (5/27/2025)    Patient History     Smoking Tobacco Use: Former     Smokeless Tobacco Use: Never     Passive Exposure: Past        Objective  Vitals:    05/27/25 1130   BP: 130/84   Pulse: 95   Resp: 18   SpO2: 90%  Comment: 2 liters   Weight: 36.2 kg (79 lb 12.8 oz)   Height: 149.9 cm (59.02\")   PainSc: 0-No pain      Vitals:    05/27/25 1130   BP: 130/84   Pulse: 95   Resp: 18   SpO2: 90%   Weight: 36.2 kg (79 lb 12.8 oz)   Height: 149.9 cm (59.02\")          Physical Exam  Constitutional:       Appearance: Not in distress.   Neck:      Vascular: JVD normal.   Pulmonary:      Breath sounds: Decreased breath sounds present.   Cardiovascular:      Normal rate. Regular rhythm.      Murmurs: There is no murmur.   Pulses:     Intact distal pulses.   Edema:     Peripheral edema absent.   Neurological:      Mental Status: Alert.              Diagnostic Data  Lab Results   Component Value Date     04/15/2025    K 4.4 04/15/2025    CL 98 04/15/2025    CO2 22 04/15/2025    BUN 15 04/15/2025    CREATININE 0.68 04/15/2025    CALCIUM 9.7 04/15/2025    BILITOT 0.5 04/15/2025    ALKPHOS 89 04/15/2025    ALT 9 04/15/2025    AST 15 04/15/2025    GLUCOSE 98 04/15/2025    ALBUMIN 4.4 04/15/2025     Lab Results   Component Value Date    WBC 7.3 04/15/2025    HGB 13.7 04/15/2025    HCT 40.9 04/15/2025     04/15/2025     No results found for: \"APTT\", \"INR\", \"PTT\"  No results found for: \"CKTOTAL\", \"TROPONINI\", \"TROPONINT\", \"CKMBINDEX\", \"BNP\"  No results found for: \"BNP\", \"PROBNP\"    Lab Results   Component Value Date    CHLPL 196 04/15/2025    TRIG 83 04/15/2025    HDL 82 04/15/2025    LDL 99 04/15/2025     Lab Results "   Component Value Date    TSH 0.622 04/15/2025    FREET4 1.36 12/09/2022       CV Diagnostics:  Procedures  CXR: No results found for this or any previous visit.     ECHO/MUGA: Results for orders placed in visit on 12/20/23    Adult Transthoracic Echo Complete W/ Cont if Necessary Per Protocol    Interpretation Summary    Poor windows; incomplete study.    Normal LV size and function, ejection fraction estimated by 2D, 56 - 60%.    Mild to moderate mitral valve regurgitation.    Left ventricular diastolic function was not assessed.     STRESS TESTS: No results found for this or any previous visit.     CARDIAC CATH: No results found for this or any previous visit.     DEVICES: No valid procedures specified.   HOLTER: No results found for this or any previous visit.     CT/MRI:  Results for orders placed during the hospital encounter of 07/12/24    CT Angiogram Coronary    Narrative  CT ANGIOGRAM CORONARY    Date of Exam: 7/12/2024 10:15 AM EDT    Indication: . Chest pain    Comparison: None available.    Technique: Non-contrasted CT images of the chest were performed for calcium scoring purposes followed by CTA images of the chest after the uneventful intravenous administration of contrast . Reconstructed coronal and sagittal images were also obtained.  In addition, a 3-D volume rendered image was created for interpretation. Automated exposure control and iterative reconstruction methods were used.      Findings:  Aorta is normal in caliber. The great vessels are unremarkable. Pulmonary artery is normal in caliber. Left atrium is unremarkable. No adenopathy is identified. Esophagus is unremarkable. Status post left thyroidectomy. Several right thyroid nodules are  identified. Limited views of the upper abdomen are unremarkable. Severe changes of emphysema. There is nodular area of scarring within the left upper lobe measuring 1.4 cm in diameter. No other suspicious pulmonary nodules. No aggressive appearing lytic  or  sclerotic bone lesions are identified.    Impression  Impression:    1. Nodular area of scarring in the left lung apex measuring 1.4 cm. Recommend correlation with prior outside imaging for stability. If there are no priors, recommend a 6-month follow-up for stability.    Please refer to CT Angiogram Coronary   Cardiology Interp report for information on cardiac structures.    Electronically Signed: Steven Elizalde MD  7/15/2024 1:12 PM EDT  Workstation ID: [id number]    VASCULAR: No valid procedures specified.     Assessment and Plan  Diagnoses and all orders for this visit:    1. Coronary artery disease involving native coronary artery of native heart with refractory angina pectoris (Primary)  Assessment & Plan:  Coronary artery disease:  November 2006: [facility] with PTCA for septal  2.5 x 8 Cypher to ostium of LAD/RAMUS.  Normal LVEF.   April 2011: MPS per [facility]:  Normal perfusion, ejection fraction preserved.  9/14 echo wnl IDD  Coronary artery disease is unchanged. CCS 1 angina, with no significant chest pains reported on optimal medical therapy.  Stress test showing old myocardial infarction but no ischemia.  Echo very limited in view of poor windows, so unable to detect regional motion modalities there, repeat with contrast showing no regional motion normalities, possibly related to severe ischemia versus small infarction not seen on echo.  Coronary CTA negative for severe CAD except at the site of the prior stent, that was not seen very well.  Severe emphysema complicating the presentation.  High risk for cardiac catheterization in view of weight loss/cachexia/poor appetite and severe emphysema.  Continue metoprolol succinate 25 mg 1 tab daily, aspirin 81 mg p.o. daily, ranolazine to 1000 mg p.o. twice daily.  No room in blood pressure for additional medical therapy.  LDL to goal so deferring ezetimibe for now.  Clinical follow-up.      2. Primary hypertension  Assessment & Plan:  Hypertension is stable and  controlled  Continue current treatment regimen.  Dietary sodium restriction.  Regular aerobic exercise.  Ambulatory blood pressure monitoring.  Blood pressure will be reassessed in 6 months.      3. Mixed hyperlipidemia  Assessment & Plan:  Lipid abnormalities are improving with treatment    Plan:  Continue same medication/s without change.  Fish oil 1000 mg twice daily.  Allergy to statins.  Frailty limiting more aggressive medical therapy.    Counseled patient on lifestyle modifications to help control hyperlipidemia.     Lab Results   Component Value Date    LDL 99 04/15/2025    LDL 99 12/22/2023    HDL 82 04/15/2025    HDL 68 12/22/2023    CHLPL 196 04/15/2025    CHLPL 183 12/22/2023    TRIG 83 04/15/2025    TRIG 87 12/22/2023       Patient Treatment Goals:   LDL goal is less than 70; not to target.  Can consider ezetimibe or Repatha next visit if stable condition/overall general health    Followup in 6 months.      Other orders  -     nitroglycerin (NITROSTAT) 0.4 MG SL tablet; 1 under the tongue as needed for angina, may repeat every 5 mins for up three doses.  If no resolution of chest pain, call 911 or head to the nearest emergency room.  Dispense: 25 tablet; Refill: 11         Return in about 6 months (around 11/27/2025) for Follow-up with Dr Heard.    There are no Patient Instructions on file for this visit.    Kenn Heard MD

## 2025-05-29 DIAGNOSIS — R06.2 WHEEZING: ICD-10-CM

## 2025-05-29 RX ORDER — ALBUTEROL SULFATE 90 UG/1
2 INHALANT RESPIRATORY (INHALATION) 4 TIMES DAILY
Qty: 18 G | Refills: 2 | Status: SHIPPED | OUTPATIENT
Start: 2025-05-29

## 2025-06-02 DIAGNOSIS — I25.112 CORONARY ARTERY DISEASE INVOLVING NATIVE CORONARY ARTERY OF NATIVE HEART WITH REFRACTORY ANGINA PECTORIS: ICD-10-CM

## 2025-06-02 RX ORDER — ALBUTEROL SULFATE 90 UG/1
2 INHALANT RESPIRATORY (INHALATION) EVERY 4 HOURS PRN
Qty: 3 G | Refills: 1 | Status: SHIPPED | OUTPATIENT
Start: 2025-06-02

## 2025-06-02 RX ORDER — RANOLAZINE 1000 MG/1
1000 TABLET, EXTENDED RELEASE ORAL 2 TIMES DAILY
Qty: 180 TABLET | Refills: 3 | Status: SHIPPED | OUTPATIENT
Start: 2025-06-02

## 2025-06-30 RX ORDER — ALBUTEROL SULFATE 90 UG/1
2 INHALANT RESPIRATORY (INHALATION) EVERY 4 HOURS PRN
Qty: 18 G | Refills: 1 | Status: SHIPPED | OUTPATIENT
Start: 2025-06-30

## 2025-07-09 DIAGNOSIS — F41.9 ANXIETY: ICD-10-CM

## 2025-07-09 DIAGNOSIS — J43.9 PULMONARY EMPHYSEMA, UNSPECIFIED EMPHYSEMA TYPE: ICD-10-CM

## 2025-07-09 RX ORDER — ALPRAZOLAM 0.25 MG
0.25 TABLET ORAL 3 TIMES DAILY PRN
Qty: 90 TABLET | Refills: 0 | Status: SHIPPED | OUTPATIENT
Start: 2025-07-09

## 2025-07-09 RX ORDER — ALBUTEROL SULFATE 90 UG/1
2 INHALANT RESPIRATORY (INHALATION) EVERY 4 HOURS PRN
Qty: 18 G | Refills: 1 | OUTPATIENT
Start: 2025-07-09

## 2025-07-09 NOTE — TELEPHONE ENCOUNTER
Rx Refill Note    Requested Prescriptions     Pending Prescriptions Disp Refills    ALPRAZolam (XANAX) 0.25 MG tablet 90 tablet 0     Sig: Take 1 tablet by mouth 3 (Three) Times a Day As Needed for Anxiety.        Last office visit with prescribing clinician: 5/15/2025      Next office visit with prescribing clinician: 8/15/2025   Last labs:   Last refill: 04/17/2025   Pharmacy (be sure to add in Epic). correct

## 2025-08-11 ENCOUNTER — TELEPHONE (OUTPATIENT)
Dept: FAMILY MEDICINE CLINIC | Facility: CLINIC | Age: 75
End: 2025-08-11
Payer: MEDICARE

## 2025-08-11 DIAGNOSIS — J43.9 PULMONARY EMPHYSEMA, UNSPECIFIED EMPHYSEMA TYPE: ICD-10-CM

## 2025-08-11 DIAGNOSIS — F41.9 ANXIETY: ICD-10-CM

## 2025-08-11 RX ORDER — ALBUTEROL SULFATE 90 UG/1
2 INHALANT RESPIRATORY (INHALATION) EVERY 4 HOURS PRN
Qty: 18 G | Refills: 1 | Status: SHIPPED | OUTPATIENT
Start: 2025-08-11

## 2025-08-11 RX ORDER — ALPRAZOLAM 0.25 MG
0.25 TABLET ORAL 3 TIMES DAILY PRN
Qty: 90 TABLET | Refills: 0 | Status: SHIPPED | OUTPATIENT
Start: 2025-08-11

## 2025-08-20 ENCOUNTER — OFFICE VISIT (OUTPATIENT)
Dept: FAMILY MEDICINE CLINIC | Facility: CLINIC | Age: 75
End: 2025-08-20
Payer: MEDICARE

## 2025-08-20 VITALS
HEIGHT: 59 IN | OXYGEN SATURATION: 92 % | WEIGHT: 78.9 LBS | BODY MASS INDEX: 15.91 KG/M2 | HEART RATE: 72 BPM | SYSTOLIC BLOOD PRESSURE: 110 MMHG | DIASTOLIC BLOOD PRESSURE: 72 MMHG | RESPIRATION RATE: 16 BRPM

## 2025-08-20 DIAGNOSIS — E78.2 MIXED HYPERLIPIDEMIA: ICD-10-CM

## 2025-08-20 DIAGNOSIS — I10 PRIMARY HYPERTENSION: ICD-10-CM

## 2025-08-20 DIAGNOSIS — F41.9 ANXIETY: ICD-10-CM

## 2025-08-20 DIAGNOSIS — Z00.00 MEDICARE ANNUAL WELLNESS VISIT, SUBSEQUENT: Primary | ICD-10-CM

## 2025-08-20 DIAGNOSIS — I25.10 CORONARY ARTERY DISEASE INVOLVING NATIVE HEART, UNSPECIFIED VESSEL OR LESION TYPE, UNSPECIFIED WHETHER ANGINA PRESENT: ICD-10-CM

## 2025-08-20 DIAGNOSIS — R73.9 HYPERGLYCEMIA: ICD-10-CM

## 2025-08-20 DIAGNOSIS — H25.9 AGE-RELATED CATARACT OF BOTH EYES, UNSPECIFIED AGE-RELATED CATARACT TYPE: ICD-10-CM

## 2025-08-20 DIAGNOSIS — E55.9 VITAMIN D DEFICIENCY: ICD-10-CM

## 2025-08-20 DIAGNOSIS — R63.4 LOSS OF WEIGHT: ICD-10-CM

## 2025-08-20 RX ORDER — IBANDRONATE SODIUM 150 MG/1
150 TABLET, FILM COATED ORAL
Qty: 3 TABLET | Refills: 3 | Status: SHIPPED | OUTPATIENT
Start: 2025-08-20